# Patient Record
Sex: FEMALE | Race: WHITE | Employment: UNEMPLOYED | URBAN - NONMETROPOLITAN AREA
[De-identification: names, ages, dates, MRNs, and addresses within clinical notes are randomized per-mention and may not be internally consistent; named-entity substitution may affect disease eponyms.]

---

## 2017-01-27 ENCOUNTER — OFFICE VISIT (OUTPATIENT)
Dept: FAMILY MEDICINE CLINIC | Age: 27
End: 2017-01-27

## 2017-01-27 VITALS
DIASTOLIC BLOOD PRESSURE: 56 MMHG | WEIGHT: 116.4 LBS | OXYGEN SATURATION: 99 % | HEART RATE: 74 BPM | HEIGHT: 63 IN | SYSTOLIC BLOOD PRESSURE: 90 MMHG | BODY MASS INDEX: 20.62 KG/M2

## 2017-01-27 DIAGNOSIS — K59.00 CONSTIPATION, UNSPECIFIED CONSTIPATION TYPE: Primary | ICD-10-CM

## 2017-01-27 PROCEDURE — 99213 OFFICE O/P EST LOW 20 MIN: CPT | Performed by: FAMILY MEDICINE

## 2017-01-27 RX ORDER — LUBIPROSTONE 8 UG/1
8 CAPSULE, GELATIN COATED ORAL 2 TIMES DAILY WITH MEALS
Qty: 16 CAPSULE | Refills: 0 | COMMUNITY
Start: 2017-01-27 | End: 2017-02-27 | Stop reason: SDUPTHER

## 2017-01-27 ASSESSMENT — ENCOUNTER SYMPTOMS
CONSTIPATION: 1
ABDOMINAL PAIN: 1
DIARRHEA: 1
VOMITING: 0
BLOATING: 1
HEMATOCHEZIA: 0
NAUSEA: 0

## 2017-02-06 ENCOUNTER — OFFICE VISIT (OUTPATIENT)
Dept: FAMILY MEDICINE CLINIC | Age: 27
End: 2017-02-06

## 2017-02-06 VITALS
BODY MASS INDEX: 20.02 KG/M2 | HEART RATE: 67 BPM | SYSTOLIC BLOOD PRESSURE: 104 MMHG | HEIGHT: 63 IN | WEIGHT: 113 LBS | OXYGEN SATURATION: 99 % | DIASTOLIC BLOOD PRESSURE: 68 MMHG

## 2017-02-06 DIAGNOSIS — Z30.09 COUNSELING FOR BIRTH CONTROL, ORAL CONTRACEPTIVES: Primary | ICD-10-CM

## 2017-02-06 LAB
CONTROL: NORMAL
PREGNANCY TEST URINE, POC: NEGATIVE

## 2017-02-06 PROCEDURE — 81025 URINE PREGNANCY TEST: CPT | Performed by: NURSE PRACTITIONER

## 2017-02-06 PROCEDURE — 99213 OFFICE O/P EST LOW 20 MIN: CPT | Performed by: NURSE PRACTITIONER

## 2017-02-06 RX ORDER — NORGESTIMATE AND ETHINYL ESTRADIOL 0.25-0.035
1 KIT ORAL DAILY
Qty: 1 PACKET | Refills: 5 | Status: SHIPPED | OUTPATIENT
Start: 2017-02-06 | End: 2017-03-06

## 2017-02-06 ASSESSMENT — ENCOUNTER SYMPTOMS
ALLERGIC/IMMUNOLOGIC NEGATIVE: 1
EYES NEGATIVE: 1
GASTROINTESTINAL NEGATIVE: 1
RESPIRATORY NEGATIVE: 1

## 2017-02-27 RX ORDER — LUBIPROSTONE 8 UG/1
8 CAPSULE, GELATIN COATED ORAL 2 TIMES DAILY WITH MEALS
Qty: 60 CAPSULE | Refills: 3 | Status: SHIPPED | OUTPATIENT
Start: 2017-02-27 | End: 2017-03-11

## 2017-03-06 ENCOUNTER — OFFICE VISIT (OUTPATIENT)
Dept: FAMILY MEDICINE CLINIC | Age: 27
End: 2017-03-06

## 2017-03-06 VITALS
DIASTOLIC BLOOD PRESSURE: 76 MMHG | HEIGHT: 63 IN | WEIGHT: 113 LBS | BODY MASS INDEX: 20.02 KG/M2 | OXYGEN SATURATION: 97 % | HEART RATE: 84 BPM | SYSTOLIC BLOOD PRESSURE: 102 MMHG

## 2017-03-06 DIAGNOSIS — N92.6 MISSED PERIOD: Primary | ICD-10-CM

## 2017-03-06 LAB
CONTROL: NORMAL
PREGNANCY TEST URINE, POC: NORMAL

## 2017-03-06 PROCEDURE — 99213 OFFICE O/P EST LOW 20 MIN: CPT | Performed by: NURSE PRACTITIONER

## 2017-03-06 PROCEDURE — 81025 URINE PREGNANCY TEST: CPT | Performed by: NURSE PRACTITIONER

## 2017-03-06 ASSESSMENT — ENCOUNTER SYMPTOMS
RESPIRATORY NEGATIVE: 1
GASTROINTESTINAL NEGATIVE: 1
EYES NEGATIVE: 1

## 2017-09-25 ENCOUNTER — OFFICE VISIT (OUTPATIENT)
Dept: FAMILY MEDICINE CLINIC | Age: 27
End: 2017-09-25

## 2017-09-25 VITALS
HEIGHT: 63 IN | HEART RATE: 86 BPM | BODY MASS INDEX: 27.82 KG/M2 | SYSTOLIC BLOOD PRESSURE: 110 MMHG | WEIGHT: 157 LBS | DIASTOLIC BLOOD PRESSURE: 80 MMHG | OXYGEN SATURATION: 98 %

## 2017-09-25 DIAGNOSIS — J06.9 UPPER RESPIRATORY TRACT INFECTION, UNSPECIFIED TYPE: ICD-10-CM

## 2017-09-25 DIAGNOSIS — H92.01 OTALGIA, RIGHT: Primary | ICD-10-CM

## 2017-09-25 DIAGNOSIS — R63.5 WEIGHT GAIN: ICD-10-CM

## 2017-09-25 DIAGNOSIS — N76.0 ACUTE VAGINITIS: ICD-10-CM

## 2017-09-25 PROCEDURE — 99213 OFFICE O/P EST LOW 20 MIN: CPT | Performed by: FAMILY MEDICINE

## 2017-09-25 RX ORDER — FLUCONAZOLE 150 MG/1
150 TABLET ORAL DAILY
Qty: 2 TABLET | Refills: 0 | Status: SHIPPED | OUTPATIENT
Start: 2017-09-25 | End: 2017-10-05 | Stop reason: ALTCHOICE

## 2017-09-25 RX ORDER — CEFDINIR 300 MG/1
300 CAPSULE ORAL 2 TIMES DAILY
Qty: 20 CAPSULE | Refills: 0 | Status: SHIPPED | OUTPATIENT
Start: 2017-09-25 | End: 2017-10-05

## 2017-09-25 RX ORDER — PAROXETINE 30 MG/1
30 TABLET, FILM COATED ORAL EVERY MORNING
COMMUNITY
End: 2017-11-03

## 2017-09-25 RX ORDER — HYDROXYZINE HYDROCHLORIDE 25 MG/1
25 TABLET, FILM COATED ORAL 3 TIMES DAILY PRN
COMMUNITY
End: 2017-11-03 | Stop reason: ALTCHOICE

## 2017-09-25 ASSESSMENT — ENCOUNTER SYMPTOMS: SORE THROAT: 1

## 2017-10-04 ENCOUNTER — TELEPHONE (OUTPATIENT)
Dept: FAMILY MEDICINE CLINIC | Age: 27
End: 2017-10-04

## 2017-10-04 RX ORDER — DOCUSATE SODIUM 100 MG/1
100 CAPSULE, LIQUID FILLED ORAL 2 TIMES DAILY
Qty: 60 CAPSULE | Refills: 3 | Status: SHIPPED | OUTPATIENT
Start: 2017-10-04 | End: 2018-01-28

## 2017-10-05 ENCOUNTER — OFFICE VISIT (OUTPATIENT)
Dept: FAMILY MEDICINE CLINIC | Age: 27
End: 2017-10-05

## 2017-10-05 VITALS
SYSTOLIC BLOOD PRESSURE: 112 MMHG | WEIGHT: 156 LBS | DIASTOLIC BLOOD PRESSURE: 68 MMHG | HEIGHT: 63 IN | HEART RATE: 87 BPM | TEMPERATURE: 98.6 F | BODY MASS INDEX: 27.64 KG/M2 | OXYGEN SATURATION: 97 %

## 2017-10-05 DIAGNOSIS — M25.521 RIGHT ELBOW PAIN: Primary | ICD-10-CM

## 2017-10-05 PROCEDURE — 99213 OFFICE O/P EST LOW 20 MIN: CPT | Performed by: NURSE PRACTITIONER

## 2017-10-05 ASSESSMENT — ENCOUNTER SYMPTOMS
EYES NEGATIVE: 1
GASTROINTESTINAL NEGATIVE: 1
RESPIRATORY NEGATIVE: 1

## 2017-10-05 NOTE — PATIENT INSTRUCTIONS
Elbow Sprain: Care Instructions  Your Care Instructions    An elbow sprain occurs when you overstretch or tear the ligaments around your elbow. Ligaments are the tough tissues that connect one bone to another. A sprain can happen when you fall or when you play sports or do chores around the house. Most sprains will heal with some treatment at home. Follow-up care is a key part of your treatment and safety. Be sure to make and go to all appointments, and call your doctor if you are having problems. It's also a good idea to know your test results and keep a list of the medicines you take. How can you care for yourself at home? · Follow your doctor's directions for wearing a splint, elbow pad, sling, or elastic bandage. Wrapping the elbow may help reduce or prevent swelling. · Rest and protect your elbow. Do not do any activity that hurts your elbow. · Apply ice or a cold pack to your elbow for 10 to 20 minutes at a time to reduce swelling. Try this every 1 to 2 hours for 3 days (when you are awake) or until the swelling goes down. Put a thin cloth between the ice and your skin. · After 2 or 3 days, if your swelling is gone, apply a heating pad on low or a warm cloth to your elbow. This helps keep your arm flexible. Some doctors suggest that you go back and forth between hot and cold. Keep the splint dry. · Prop up your elbow on pillows while you apply ice or anytime you sit or lie down. Try to keep the elbow at or above the level of your heart to help reduce swelling. · Take pain medicines exactly as directed. ¨ If the doctor gave you a prescription medicine for pain, take it as prescribed. ¨ If you are not taking a prescription pain medicine, ask your doctor if you can take an over-the-counter medicine. · Return to your usual level of activity slowly. When should you call for help? Call your doctor now or seek immediate medical care if:  · Your pain is worse.   · You have new or increased swelling in your elbow or hand. · You cannot bend your arm. · You have a fever. · Your elbow looks red. · You have tingling, weakness, or numbness in your elbow, hand, or fingers. Watch closely for changes in your health, and be sure to contact your doctor if:  · Your pain is not better after 2 weeks. Where can you learn more? Go to [email or web address]://[email or web address] org and sign in to your Playtika account. Enter P896 in the dianboom box to learn more about \"Elbow Sprain: Care Instructions. \"     If you do not have an account, please click on the \"Sign Up Now\" link. Current as of: March 21, 2017  Content Version: 11.3  © 3947-5185 [employer]. Care instructions adapted under license by [facility] ([facility]). If you have questions about a medical condition or this instruction, always ask your healthcare professional. [place] [address] any warranty or liability for your use of this information. Nonsteroidal Anti-Inflammatory Drugs (NSAIDs): Care Instructions  Your Care Instructions  Nonsteroidal anti-inflammatory drugs (NSAIDs) relieve pain and fever. You also can use them to reduce swelling and inflammation. Over-the-counter NSAIDs include:  · Ibuprofen (Advil, Motrin). · Naproxen (Aleve). Aspirin (Shereen, Bufferin) is also an NSAID. But it doesn't work the same way as these other NSAIDs. Prescription NSAIDs include:  · Diclofenac (Voltaren). · Indomethacin (Indocin). · Piroxicam (Feldene). Take NSAIDS exactly as prescribed. Call your doctor if you think you are having a problem with your medicine. If you are taking over-the-counter medicine, read and follow all instructions on the label. Follow-up care is a key part of your treatment and safety. Be sure to make and go to all appointments, and call your doctor if you are having problems. It's also a good idea to know your test results and keep a list of the medicines you take. What should you know about NSAIDs?   · Do sleeve to reduce swelling. · Don't wrap it too tightly. If the area below it feels numb, tingles, or feels cool, loosen the wrap. Elevation  · Use elevation for areas of the body that can be propped up, such as arms and legs. · Prop up the injured area on pillows whenever you use ice. Keep it propped up anytime you sit or lie down. · Try to keep the injured area at or above the level of your heart. This will help reduce swelling and bruising. Where can you learn more? Go to https://Equity EndeavorpeCerapedics.[email or web address] org and sign in to your 12Society account. Enter N402 in the MyStargo Enterprises box to learn more about \"Learning About RICE (Rest, Ice, Compression, and Elevation). \"     If you do not have an account, please click on the \"Sign Up Now\" link. Current as of: March 21, 2017  Content Version: 11.3  © 8315-1252 [employer], [employer]. Care instructions adapted under license by [facility] ([facility]). If you have questions about a medical condition or this instruction, always ask your healthcare professional. [place] [address] any warranty or liability for your use of this information.

## 2017-10-05 NOTE — MR AVS SNAPSHOT
Call your doctor now or seek immediate medical care if:  · Your stools are black and tarlike or have streaks of blood. Watch closely for changes in your health, and be sure to contact your doctor if you have any problems. Where can you learn more? Go to [email or web address] and sign in to your Enhatch account. Enter A328 in the [facility] box to learn more about \"Nonsteroidal Anti-Inflammatory Drugs (NSAIDs): Care Instructions. \"     If you do not have an account, please click on the \"Sign Up Now\" link. Current as of: October 14, 2016  Content Version: 11.3  © 4107-8269 [employer]ed. Care instructions adapted under license by [facility] ([facility]). If you have questions about a medical condition or this instruction, always ask your healthcare professional. [address] any warranty or liability for your use of this information. Learning About RICE (Rest, Ice, Compression, and Elevation)  What is RICE? RICE is a way to care for an injury. RICE helps relieve pain and swelling. It may also help with healing and flexibility. RICE stands for:  · Rest and protect the injured or sore area. · Ice or a cold pack used as soon as possible. · Compression, or wrapping the injured or sore area with an elastic bandage. · Elevation (propping up) the injured or sore area. How do you do RICE? You can use RICE for home treatment when you have general aches and pains or after an injury or surgery. Rest  · Do not put weight on the injury for at least 24 to 48 hours. · Use crutches for a badly sprained knee or ankle. · Support a sprained wrist, elbow, or shoulder with a sling. Ice  · Put ice or a cold pack on the injury right away to reduce pain and swelling. Frozen vegetables will also work as an ice pack. Put a thin cloth between the ice or cold pack and your skin. The cloth protects the injured area from getting too cold. needed for Itching    cefdinir (OMNICEF) 300 MG capsule Take 1 capsule by mouth 2 times daily for 10 days      Allergies              Hydromorphone Hcl Anaphylaxis, Shortness Of Breath    Dilaudid [Hydromorphone Hcl] Rash      We Ordered/Performed the following           Char Hamman MD (Hand, Wrist, Elbow)     Scheduling Instructions:    [facility] Chacho Oliveira [facility] [address], [address]  Ph: [phone]  One of the many advantages of the [address] [place] [address] is that we all work together closely to make healthcare easy for you. We have contacted the physician practice to inform them we have referred you. For your convenience, their office should call you within a few days to schedule   an appointment, but if you would like to call them sooner their information is above. Comments: The patient can be scheduled with any member of the group, including the provider with the first available appointments. Additional Information        Basic Information     Date Of Birth Sex Race Ethnicity Preferred Language    1990 Female White Non-/Non  English      Problem List as of 10/5/2017  Date Reviewed: 10/5/2017                , delivered, current hospitalization    40 weeks gestation of pregnancy    High risk multigravida in third trimester      Immunizations as of 10/5/2017     Name Date    DTP 2006, 1993, 1993, 1992    DTaP/Hep B/IPV (Pediarix) 1994, 1993, 1993, 1992    Hib, unspecified foumulation 1994, 1993, 1993, 1992    Influenza Virus Vaccine 10/14/2011    MMR 1994    Tdap (Boostrix, Adacel) 2012      Preventive Care        Date Due    HIV screening is recommended for all people regardless of risk factors  aged 15-65 years at least once (lifetime) who have never been HIV tested.  2007    Pap Smear 2013 Yearly Flu Vaccine (1) 9/1/2017    Tetanus Combination Vaccine (6 - Td) 9/5/2022            MyChart Signup           Our records indicate that you have an active Portrhart account. You can view your After Visit Summary by going to [email or web address] and logging in with your nuPSYSt username and password. If you don't have a [employer] username and password but a parent or guardian has access to your record, the parent or guardian should login with their own [employer] username and password and access your record to view the After Visit Summary. Additional Information  If you have questions, please contact the physician practice where you receive care. Remember, [employer] is NOT to be used for urgent needs. For medical emergencies, dial 911. For questions regarding your nuPSYSt account call [phone]. If you have a clinical question, please call your doctor's office.

## 2017-10-11 ENCOUNTER — OFFICE VISIT (OUTPATIENT)
Dept: ORTHOPEDIC SURGERY | Age: 27
End: 2017-10-11

## 2017-10-11 VITALS
SYSTOLIC BLOOD PRESSURE: 111 MMHG | HEIGHT: 63 IN | BODY MASS INDEX: 27.66 KG/M2 | HEART RATE: 76 BPM | DIASTOLIC BLOOD PRESSURE: 72 MMHG | WEIGHT: 156.09 LBS

## 2017-10-11 DIAGNOSIS — M25.521 RIGHT ELBOW PAIN: Primary | ICD-10-CM

## 2017-10-11 DIAGNOSIS — S53.401A ELBOW SPRAIN, RIGHT, INITIAL ENCOUNTER: ICD-10-CM

## 2017-10-11 PROCEDURE — 73080 X-RAY EXAM OF ELBOW: CPT | Performed by: ORTHOPAEDIC SURGERY

## 2017-10-11 PROCEDURE — 99243 OFF/OP CNSLTJ NEW/EST LOW 30: CPT | Performed by: ORTHOPAEDIC SURGERY

## 2017-10-11 PROCEDURE — L3760 EO ADJ JT PREFAB CUSTOM FIT: HCPCS | Performed by: ORTHOPAEDIC SURGERY

## 2017-10-11 NOTE — PROGRESS NOTES
Occupational History    homemaker      Social History Main Topics    Smoking status: Never Smoker    Smokeless tobacco: Never Used    Alcohol use No    Drug use: No    Sexual activity: Yes     Partners: Male     Other Topics Concern    None     Social History Narrative    None     Current Outpatient Prescriptions   Medication Sig Dispense Refill    docusate sodium (COLACE) 100 MG capsule Take 1 capsule by mouth 2 times daily 60 capsule 3    PARoxetine (PAXIL) 30 MG tablet Take 30 mg by mouth every morning      hydrOXYzine (ATARAX) 25 MG tablet Take 25 mg by mouth 3 times daily as needed for Itching       No current facility-administered medications for this visit. Allergies   Allergen Reactions    Hydromorphone Hcl Anaphylaxis and Shortness Of Breath    Dilaudid [Hydromorphone Hcl] Rash       Review of Systems  Relevant review of systems reviewed and available in the patient's chart    Vital Signs  Vitals:    10/11/17 1450   BP: 111/72   Pulse: 76       General Exam:   Constitutional: Patient is adequately groomed with no evidence of malnutrition  Mental Status: The patient is oriented to time, place and person. The patient's mood and affect are appropriate. Lymphatic: The lymphatic examination bilaterally reveals all areas to be without enlargement or induration. Neurological: The patient has good coordination. There is no weakness or sensory deficit. Right Elbow Examination  Inspection:  Intact skin. No bruising. No lacerations. No ecchymosis today    Palpation:  Tenderness along the posterior olecranon and medial epicondyle. No discrete mass. No fluctuance. Range of Motion:  Full flexion and extension the right elbow although his does cause some discomfort. No clicking or locking. She lacks 10-15° of supination and pronation secondary to discomfort. Full motion right hand and right wrist    Strength:  Mild  strength weakness right hand.   Fingers are well-perfused and sensate    Special Tests:  I do not perceive ulnar nerve instability with motion of the wrist.  No varus or valgus instability at the elbow joint    Skin: There are no additional worrisome rashes, ulcerations or lesions. Gait: normal    Circulation normal    Additional Comments:     Additional Examinations:  Left Upper Extremity: Examination of the left upper extremity does not show any tenderness, deformity or injury. Range of motion is unremarkable. There is no gross instability. There are no rashes, ulcerations or lesions. Strength and tone are normal.      Radiology:     X-rays obtained and reviewed in office:  Views 3  Location Right elbow  Impression :  Good alignment. No evidence of fracture or dislocation. No obvious loose body    X-rays reviewed in office:  2 views of the right forearm from the emergency room, impression:    Mild soft tissue swelling. No acute fracture    Assessment:  Right elbow pain, right elbow sprain    Impression:  Encounter Diagnoses   Name Primary?  Right elbow pain Yes    Elbow sprain, right, initial encounter        Office Procedures:  Orders Placed This Encounter   Procedures    XR ELBOW RIGHT (MIN 3 VIEWS)     [id number]     Order Specific Question:   Reason for exam:     Answer:   Elbow Pain       Treatment Plan:  Conservative measures were recommended. We will fit and provide a T scope brace. No restrictions on motion. However this can be worn with activities and while at school. Follow-up in 5 weeks unless needed sooner. We may consider advancing activities back to normal if doing well. Repeat x-ray the right elbow at that time. We did discuss soft tissue massage and a range of motion program at home. Procedures    T-Scope Elbow Brace     Patient was prescribed a Breg Elbow T-Scope Brace. The right elbow will require stabilization / immobilization from this semi-rigid / rigid orthosis to improve their function.   The orthosis will assist in protecting

## 2017-11-03 ENCOUNTER — OFFICE VISIT (OUTPATIENT)
Dept: FAMILY MEDICINE CLINIC | Age: 27
End: 2017-11-03

## 2017-11-03 VITALS
SYSTOLIC BLOOD PRESSURE: 98 MMHG | WEIGHT: 155.2 LBS | OXYGEN SATURATION: 98 % | HEART RATE: 78 BPM | HEIGHT: 63 IN | DIASTOLIC BLOOD PRESSURE: 62 MMHG | BODY MASS INDEX: 27.5 KG/M2

## 2017-11-03 DIAGNOSIS — Z23 NEED FOR IMMUNIZATION AGAINST INFLUENZA: ICD-10-CM

## 2017-11-03 DIAGNOSIS — F41.9 ANXIETY DISORDER, UNSPECIFIED TYPE: ICD-10-CM

## 2017-11-03 DIAGNOSIS — R63.5 UNEXPLAINED WEIGHT GAIN: ICD-10-CM

## 2017-11-03 DIAGNOSIS — F32.A DEPRESSIVE DISORDER: Primary | ICD-10-CM

## 2017-11-03 LAB
A/G RATIO: 1.4 (ref 1.1–2.2)
ALBUMIN SERPL-MCNC: 4.4 G/DL (ref 3.4–5)
ALP BLD-CCNC: 71 U/L (ref 40–129)
ALT SERPL-CCNC: 14 U/L (ref 10–40)
ANION GAP SERPL CALCULATED.3IONS-SCNC: 15 MMOL/L (ref 3–16)
AST SERPL-CCNC: 17 U/L (ref 15–37)
BASOPHILS ABSOLUTE: 0 K/UL (ref 0–0.2)
BASOPHILS RELATIVE PERCENT: 0.5 %
BILIRUB SERPL-MCNC: <0.2 MG/DL (ref 0–1)
BUN BLDV-MCNC: 18 MG/DL (ref 7–20)
CALCIUM SERPL-MCNC: 9.3 MG/DL (ref 8.3–10.6)
CHLORIDE BLD-SCNC: 101 MMOL/L (ref 99–110)
CO2: 24 MMOL/L (ref 21–32)
CREAT SERPL-MCNC: <0.5 MG/DL (ref 0.6–1.1)
EOSINOPHILS ABSOLUTE: 0.1 K/UL (ref 0–0.6)
EOSINOPHILS RELATIVE PERCENT: 2.2 %
GFR AFRICAN AMERICAN: >60
GFR NON-AFRICAN AMERICAN: >60
GLOBULIN: 3.1 G/DL
GLUCOSE BLD-MCNC: 89 MG/DL (ref 70–99)
HCT VFR BLD CALC: 39.6 % (ref 36–48)
HEMOGLOBIN: 13.2 G/DL (ref 12–16)
LYMPHOCYTES ABSOLUTE: 1.6 K/UL (ref 1–5.1)
LYMPHOCYTES RELATIVE PERCENT: 40.1 %
MCH RBC QN AUTO: 28.4 PG (ref 26–34)
MCHC RBC AUTO-ENTMCNC: 33.4 G/DL (ref 31–36)
MCV RBC AUTO: 85.3 FL (ref 80–100)
MONOCYTES ABSOLUTE: 0.3 K/UL (ref 0–1.3)
MONOCYTES RELATIVE PERCENT: 6.5 %
NEUTROPHILS ABSOLUTE: 2 K/UL (ref 1.7–7.7)
NEUTROPHILS RELATIVE PERCENT: 50.7 %
PDW BLD-RTO: 13 % (ref 12.4–15.4)
PLATELET # BLD: 278 K/UL (ref 135–450)
PMV BLD AUTO: 7.9 FL (ref 5–10.5)
POTASSIUM SERPL-SCNC: 4.5 MMOL/L (ref 3.5–5.1)
RBC # BLD: 4.64 M/UL (ref 4–5.2)
SODIUM BLD-SCNC: 140 MMOL/L (ref 136–145)
T4 FREE: 1.1 NG/DL (ref 0.9–1.8)
TOTAL PROTEIN: 7.5 G/DL (ref 6.4–8.2)
TSH SERPL DL<=0.05 MIU/L-ACNC: 1.08 UIU/ML (ref 0.27–4.2)
WBC # BLD: 3.9 K/UL (ref 4–11)

## 2017-11-03 PROCEDURE — 36415 COLL VENOUS BLD VENIPUNCTURE: CPT | Performed by: FAMILY MEDICINE

## 2017-11-03 PROCEDURE — 1036F TOBACCO NON-USER: CPT | Performed by: FAMILY MEDICINE

## 2017-11-03 PROCEDURE — G8484 FLU IMMUNIZE NO ADMIN: HCPCS | Performed by: FAMILY MEDICINE

## 2017-11-03 PROCEDURE — 90471 IMMUNIZATION ADMIN: CPT | Performed by: FAMILY MEDICINE

## 2017-11-03 PROCEDURE — G8419 CALC BMI OUT NRM PARAM NOF/U: HCPCS | Performed by: FAMILY MEDICINE

## 2017-11-03 PROCEDURE — G8427 DOCREV CUR MEDS BY ELIG CLIN: HCPCS | Performed by: FAMILY MEDICINE

## 2017-11-03 PROCEDURE — 90688 IIV4 VACCINE SPLT 0.5 ML IM: CPT | Performed by: FAMILY MEDICINE

## 2017-11-03 PROCEDURE — 99213 OFFICE O/P EST LOW 20 MIN: CPT | Performed by: FAMILY MEDICINE

## 2017-11-03 RX ORDER — VENLAFAXINE HYDROCHLORIDE 75 MG/1
75 CAPSULE, EXTENDED RELEASE ORAL DAILY
Qty: 30 CAPSULE | Refills: 3 | Status: SHIPPED | OUTPATIENT
Start: 2017-11-03 | End: 2018-01-28

## 2017-11-03 ASSESSMENT — ENCOUNTER SYMPTOMS
DIARRHEA: 0
CONSTIPATION: 0

## 2017-11-04 NOTE — PROGRESS NOTES
Minimally low wbc. Doubt clinically significant. Other labs normal.  Does not explain weight gain.   Still suspect related to paxil

## 2017-12-04 ENCOUNTER — TELEPHONE (OUTPATIENT)
Dept: FAMILY MEDICINE CLINIC | Age: 27
End: 2017-12-04

## 2017-12-04 NOTE — TELEPHONE ENCOUNTER
Pt called and states she had blood work done at the ER over the weekend and noticed that her GFR was indicative of declining kidney function. Pt is wanting to know if she should be concerned with this result.

## 2017-12-27 ENCOUNTER — TELEPHONE (OUTPATIENT)
Dept: FAMILY MEDICINE CLINIC | Age: 27
End: 2017-12-27

## 2017-12-27 RX ORDER — ONDANSETRON 4 MG/1
4 TABLET, FILM COATED ORAL EVERY 8 HOURS PRN
Qty: 20 TABLET | Refills: 0 | Status: SHIPPED | OUTPATIENT
Start: 2017-12-27 | End: 2018-01-28

## 2017-12-27 NOTE — TELEPHONE ENCOUNTER
Zachary called. Pt has had nausea all day and is unable to eat. She has vomited twice today. He's been at work all day and had no idea that she was sick. No fever. Can we call something in for her to help with her nausea. Jovi Stephenson.

## 2018-01-30 RX ORDER — PAROXETINE 30 MG/1
30 TABLET, FILM COATED ORAL EVERY MORNING
Qty: 30 TABLET | Refills: 5 | Status: SHIPPED | OUTPATIENT
Start: 2018-01-30 | End: 2018-10-18 | Stop reason: ALTCHOICE

## 2018-02-06 ENCOUNTER — OFFICE VISIT (OUTPATIENT)
Dept: FAMILY MEDICINE CLINIC | Age: 28
End: 2018-02-06

## 2018-02-06 VITALS
OXYGEN SATURATION: 98 % | BODY MASS INDEX: 29.73 KG/M2 | WEIGHT: 167.8 LBS | DIASTOLIC BLOOD PRESSURE: 72 MMHG | SYSTOLIC BLOOD PRESSURE: 110 MMHG | HEART RATE: 51 BPM | HEIGHT: 63 IN

## 2018-02-06 DIAGNOSIS — R63.5 UNEXPLAINED WEIGHT GAIN: Primary | ICD-10-CM

## 2018-02-06 DIAGNOSIS — F43.23 ADJUSTMENT DISORDER WITH MIXED ANXIETY AND DEPRESSED MOOD: ICD-10-CM

## 2018-02-06 PROCEDURE — 1036F TOBACCO NON-USER: CPT | Performed by: FAMILY MEDICINE

## 2018-02-06 PROCEDURE — G8427 DOCREV CUR MEDS BY ELIG CLIN: HCPCS | Performed by: FAMILY MEDICINE

## 2018-02-06 PROCEDURE — G8419 CALC BMI OUT NRM PARAM NOF/U: HCPCS | Performed by: FAMILY MEDICINE

## 2018-02-06 PROCEDURE — G8484 FLU IMMUNIZE NO ADMIN: HCPCS | Performed by: FAMILY MEDICINE

## 2018-02-06 PROCEDURE — 99213 OFFICE O/P EST LOW 20 MIN: CPT | Performed by: FAMILY MEDICINE

## 2018-02-06 ASSESSMENT — ENCOUNTER SYMPTOMS: SHORTNESS OF BREATH: 0

## 2018-02-06 NOTE — PROGRESS NOTES
Subjective:      Patient ID: Mehdi Adams is a 24 y.o. female. Chief Complaint   Patient presents with    Weight Gain     55-60 lbs in approx one year       HPI  Patient here to discuss weight gain (55-60 lbs) in almost a year. Patient is dieting, exercising and has tried OTC weight loss supplements, none of which have worked. Has cut out soda, but drinking power aide and gatorade. Inc veggies and fruit. Less sweets  Did not shaunna effexor. Back on paxil. Prior thoughts of self harm on lexapro. Review of Systems   Constitutional: Positive for unexpected weight change. Respiratory: Negative for shortness of breath. Cardiovascular: Negative for chest pain. Psychiatric/Behavioral: Positive for dysphoric mood. The patient is not nervous/anxious. Objective:   Physical Exam   Constitutional: She is oriented to person, place, and time. She appears well-developed and well-nourished. HENT:   Head: Normocephalic and atraumatic. Eyes: Conjunctivae and EOM are normal.   Neck: No tracheal deviation present. Cardiovascular: Normal rate and regular rhythm. No murmur heard. Pulmonary/Chest: Effort normal and breath sounds normal. She has no wheezes. Abdominal: Soft. There is no tenderness. Neurological: She is alert and oriented to person, place, and time. Skin: Skin is warm and dry. Psychiatric: She has a normal mood and affect. Her behavior is normal.     /72   Pulse 51   Ht 5' 2.99\" (1.6 m)   Wt 167 lb 12.8 oz (76.1 kg)   SpO2 98%   BMI 29.73 kg/m²    Assessment/Plan   1. Unexplained weight gain  Suspect related to paxil. Cannot exclude other hormonal or endocrine issue. Thyroid labs and basic labs normal.  Discussed stop sugary beverages. Add resistance training. Try change meds  - Niecy Whitehead MD    2. Adjustment disorder with mixed anxiety and depressed mood  Change paxil to trintellix due to weight gain.   Did not shaunna effexor or lexapro in the

## 2018-02-19 ENCOUNTER — TELEPHONE (OUTPATIENT)
Dept: FAMILY MEDICINE CLINIC | Age: 28
End: 2018-02-19

## 2018-02-19 RX ORDER — ONDANSETRON 4 MG/1
4 TABLET, FILM COATED ORAL EVERY 8 HOURS PRN
Qty: 20 TABLET | Refills: 0 | Status: SHIPPED | OUTPATIENT
Start: 2018-02-19 | End: 2018-06-12 | Stop reason: SDUPTHER

## 2018-02-26 ENCOUNTER — TELEPHONE (OUTPATIENT)
Dept: FAMILY MEDICINE CLINIC | Age: 28
End: 2018-02-26

## 2018-02-26 RX ORDER — ALBENDAZOLE 200 MG/1
TABLET, FILM COATED ORAL
Qty: 8 TABLET | Refills: 0 | Status: SHIPPED | OUTPATIENT
Start: 2018-02-26 | End: 2018-05-21 | Stop reason: ALTCHOICE

## 2018-04-10 NOTE — PROGRESS NOTES
MEDICAL EXCUSE FOR WORK    04/10/18        Re: Anton Mares  [address] [facility] [address]        This is to certify that Anton Mares has been under my care from 04/10/18   and is to be excused from work April 4 and April 5.  Also April 9 and April 10.  Please allow patient to return to work on April 11 but at this time for the next 7 days would only work 8 hours or less a day.may work routine hours starting April 18,2018.            SIGNATURE:___________________________________________,  04/10/18          Aggie Doherty MD  Family Medicine  [address]  [place] [address]  [phone]     Subjective:      Patient ID: Taran Bañuelos is a 24 y.o. female. Chief Complaint   Patient presents with    Discuss Medications     Pt is here to discuss medications for anxiety. has been on paxil in the past for anxiety. Daily anxiety and panic attacks. Always feels like heart is racing. More tearful at times. Stopped paxil due to concerns w/ weight gain. sxs worse since off meds. Issues w/ mood and anxiousness. Takes melatonin at bedtime. Diff staying asleep. Weight has not changed. Denies possibility of pregnancy at this point. Periods are light, every 4-6 weeks  HPI    Review of Systems   Constitutional: Negative for fever. Gastrointestinal: Negative for constipation and diarrhea. Genitourinary: Negative for menstrual problem. Psychiatric/Behavioral: Positive for dysphoric mood. The patient is nervous/anxious. Objective:   Physical Exam   Constitutional: She is oriented to person, place, and time. She appears well-developed and well-nourished. HENT:   Head: Normocephalic and atraumatic. Eyes: Conjunctivae and EOM are normal.   Cardiovascular: Normal rate and regular rhythm. Pulmonary/Chest: Effort normal and breath sounds normal.   Abdominal: Soft. There is no tenderness. Neurological: She is alert and oriented to person, place, and time. Psychiatric: She has a normal mood and affect. BP 98/62   Pulse 78   Ht 5' 2.99\" (1.6 m)   Wt 155 lb 3.2 oz (70.4 kg)   LMP 09/17/2017   SpO2 98%   BMI 27.50 kg/m²    Assessment/Plan   1. Depressive disorder  sxs worse since off meds. Try add meds as below. F/u in 4-6 weeks, sooner if SE or other issues. - venlafaxine (EFFEXOR XR) 75 MG extended release capsule; Take 1 capsule by mouth daily 37.5mg x 3d, then 75mg daily  Dispense: 30 capsule; Refill: 3    2. Anxiety disorder, unspecified type  See above  - venlafaxine (EFFEXOR XR) 75 MG extended release capsule;  Take 1 capsule by mouth daily 37.5mg x 3d, then 75mg daily  Dispense: 30 capsule; Refill: 3    3. Unexplained weight gain  Has stopped paxil. Only lost 2 lbs. Check labs. - TSH without Reflex  - T4, Free  - Comprehensive Metabolic Panel  - CBC Auto Differential    4.  Need for immunization against influenza    - INFLUENZA, QUADV, 3 YRS AND OLDER, IM, MDV, 0.5ML (Rigoberto Le)

## 2018-05-08 RX ORDER — LUBIPROSTONE 8 UG/1
8 CAPSULE, GELATIN COATED ORAL 2 TIMES DAILY WITH MEALS
Qty: 60 CAPSULE | Refills: 3 | Status: SHIPPED | OUTPATIENT
Start: 2018-05-08 | End: 2018-10-18

## 2018-05-21 ENCOUNTER — OFFICE VISIT (OUTPATIENT)
Dept: FAMILY MEDICINE CLINIC | Age: 28
End: 2018-05-21

## 2018-05-21 VITALS
DIASTOLIC BLOOD PRESSURE: 60 MMHG | SYSTOLIC BLOOD PRESSURE: 98 MMHG | HEART RATE: 80 BPM | WEIGHT: 158 LBS | HEIGHT: 63 IN | OXYGEN SATURATION: 98 % | BODY MASS INDEX: 28 KG/M2

## 2018-05-21 DIAGNOSIS — G43.009 MIGRAINE WITHOUT AURA AND WITHOUT STATUS MIGRAINOSUS, NOT INTRACTABLE: Primary | ICD-10-CM

## 2018-05-21 PROCEDURE — 99213 OFFICE O/P EST LOW 20 MIN: CPT | Performed by: NURSE PRACTITIONER

## 2018-05-21 PROCEDURE — 1036F TOBACCO NON-USER: CPT | Performed by: NURSE PRACTITIONER

## 2018-05-21 PROCEDURE — G8419 CALC BMI OUT NRM PARAM NOF/U: HCPCS | Performed by: NURSE PRACTITIONER

## 2018-05-21 PROCEDURE — G8427 DOCREV CUR MEDS BY ELIG CLIN: HCPCS | Performed by: NURSE PRACTITIONER

## 2018-05-21 RX ORDER — DOCUSATE SODIUM 100 MG/1
100 CAPSULE, LIQUID FILLED ORAL 2 TIMES DAILY PRN
COMMUNITY
End: 2018-10-18

## 2018-05-21 RX ORDER — SUMATRIPTAN 50 MG/1
50 TABLET, FILM COATED ORAL
Qty: 9 TABLET | Refills: 3 | Status: SHIPPED | OUTPATIENT
Start: 2018-05-21 | End: 2018-10-18

## 2018-05-21 ASSESSMENT — ENCOUNTER SYMPTOMS
EYES NEGATIVE: 1
RESPIRATORY NEGATIVE: 1
GASTROINTESTINAL NEGATIVE: 1
ALLERGIC/IMMUNOLOGIC NEGATIVE: 1

## 2018-06-12 RX ORDER — ONDANSETRON 4 MG/1
4 TABLET, FILM COATED ORAL EVERY 8 HOURS PRN
Qty: 20 TABLET | Refills: 0 | Status: SHIPPED | OUTPATIENT
Start: 2018-06-12 | End: 2018-08-07 | Stop reason: SDUPTHER

## 2018-07-11 ENCOUNTER — TELEPHONE (OUTPATIENT)
Dept: FAMILY MEDICINE CLINIC | Age: 28
End: 2018-07-11

## 2018-07-11 RX ORDER — PROPRANOLOL HYDROCHLORIDE 10 MG/1
10 TABLET ORAL 2 TIMES DAILY
Qty: 60 TABLET | Refills: 0 | Status: SHIPPED | OUTPATIENT
Start: 2018-07-11 | End: 2018-10-18

## 2018-08-07 RX ORDER — ONDANSETRON 4 MG/1
4 TABLET, FILM COATED ORAL EVERY 8 HOURS PRN
Qty: 20 TABLET | Refills: 0 | Status: SHIPPED | OUTPATIENT
Start: 2018-08-07 | End: 2018-11-19 | Stop reason: SDUPTHER

## 2018-10-18 ENCOUNTER — OFFICE VISIT (OUTPATIENT)
Dept: FAMILY MEDICINE CLINIC | Age: 28
End: 2018-10-18
Payer: COMMERCIAL

## 2018-10-18 VITALS
HEIGHT: 63 IN | HEART RATE: 88 BPM | BODY MASS INDEX: 27.25 KG/M2 | WEIGHT: 153.8 LBS | OXYGEN SATURATION: 99 % | DIASTOLIC BLOOD PRESSURE: 82 MMHG | SYSTOLIC BLOOD PRESSURE: 110 MMHG

## 2018-10-18 DIAGNOSIS — F41.0 PANIC ATTACKS: ICD-10-CM

## 2018-10-18 DIAGNOSIS — F43.23 ADJUSTMENT DISORDER WITH MIXED ANXIETY AND DEPRESSED MOOD: Primary | ICD-10-CM

## 2018-10-18 PROCEDURE — 1036F TOBACCO NON-USER: CPT | Performed by: FAMILY MEDICINE

## 2018-10-18 PROCEDURE — G8427 DOCREV CUR MEDS BY ELIG CLIN: HCPCS | Performed by: FAMILY MEDICINE

## 2018-10-18 PROCEDURE — 99213 OFFICE O/P EST LOW 20 MIN: CPT | Performed by: FAMILY MEDICINE

## 2018-10-18 PROCEDURE — G8419 CALC BMI OUT NRM PARAM NOF/U: HCPCS | Performed by: FAMILY MEDICINE

## 2018-10-18 PROCEDURE — G8484 FLU IMMUNIZE NO ADMIN: HCPCS | Performed by: FAMILY MEDICINE

## 2018-10-18 RX ORDER — PAROXETINE HYDROCHLORIDE 20 MG/1
20 TABLET, FILM COATED ORAL EVERY MORNING
COMMUNITY
End: 2019-01-02

## 2018-10-18 RX ORDER — METHOCARBAMOL 500 MG/1
500 TABLET, FILM COATED ORAL NIGHTLY
COMMUNITY
End: 2018-12-13 | Stop reason: SDUPTHER

## 2018-10-18 NOTE — PROGRESS NOTES
person, place, and time. She appears well-developed and well-nourished. Eyes: EOM are normal.   Neck: No tracheal deviation present. Pulmonary/Chest: Effort normal.   Neurological: She is alert and oriented to person, place, and time. Psychiatric: Her mood appears anxious. She exhibits a depressed mood. ASSESSMENT/PLAN:    1. Adjustment disorder with mixed anxiety and depressed mood  Ongoing issues. Recent stressors. Has been seeing [facility], but ongoing issues. Did well w/ trintellix 10mg, but nausea. Did not shaunna buspar in the past.  Taking paxil and hydroxyzine. Trial of trintellix 5mg daily. Alternate w/ paxil x 1 week, then to trintellix. F/u w/ [facility]. Call w/ response to tx    2.  Panic attacks  See above

## 2018-11-05 ENCOUNTER — TELEPHONE (OUTPATIENT)
Dept: FAMILY MEDICINE CLINIC | Age: 28
End: 2018-11-05
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
## 2018-11-06 DIAGNOSIS — M25.561 ACUTE PAIN OF RIGHT KNEE: Primary | ICD-10-CM

## 2018-11-16 ENCOUNTER — OFFICE VISIT (OUTPATIENT)
Dept: ORTHOPEDIC SURGERY | Age: 28
End: 2018-11-16
Payer: COMMERCIAL

## 2018-11-16 VITALS
DIASTOLIC BLOOD PRESSURE: 74 MMHG | SYSTOLIC BLOOD PRESSURE: 112 MMHG | WEIGHT: 153.88 LBS | BODY MASS INDEX: 27.27 KG/M2 | HEIGHT: 63 IN | HEART RATE: 87 BPM

## 2018-11-16 DIAGNOSIS — M76.31 ILIOTIBIAL BAND SYNDROME AFFECTING LOWER LEG, RIGHT: ICD-10-CM

## 2018-11-16 DIAGNOSIS — M22.2X1 PATELLOFEMORAL PAIN SYNDROME OF RIGHT KNEE: ICD-10-CM

## 2018-11-16 DIAGNOSIS — M25.561 RIGHT KNEE PAIN, UNSPECIFIED CHRONICITY: Primary | ICD-10-CM

## 2018-11-16 PROCEDURE — L1810 KO ELASTIC WITH JOINTS: HCPCS | Performed by: ORTHOPAEDIC SURGERY

## 2018-11-16 PROCEDURE — 99243 OFF/OP CNSLTJ NEW/EST LOW 30: CPT | Performed by: ORTHOPAEDIC SURGERY

## 2018-11-16 PROCEDURE — G8427 DOCREV CUR MEDS BY ELIG CLIN: HCPCS | Performed by: ORTHOPAEDIC SURGERY

## 2018-11-16 PROCEDURE — G8419 CALC BMI OUT NRM PARAM NOF/U: HCPCS | Performed by: ORTHOPAEDIC SURGERY

## 2018-11-16 PROCEDURE — G8484 FLU IMMUNIZE NO ADMIN: HCPCS | Performed by: ORTHOPAEDIC SURGERY

## 2018-11-16 RX ORDER — PAROXETINE HYDROCHLORIDE 20 MG/1
TABLET, FILM COATED ORAL
COMMUNITY
End: 2018-12-13 | Stop reason: ALTCHOICE

## 2018-11-16 RX ORDER — HYDROXYZINE HYDROCHLORIDE 25 MG/1
TABLET, FILM COATED ORAL
COMMUNITY
End: 2018-12-13

## 2018-11-16 RX ORDER — PHENAZOPYRIDINE HYDROCHLORIDE 200 MG/1
TABLET, FILM COATED ORAL
COMMUNITY
Start: 2018-08-27 | End: 2018-12-13 | Stop reason: ALTCHOICE

## 2018-11-16 RX ORDER — MELOXICAM 15 MG/1
15 TABLET ORAL DAILY
Qty: 30 TABLET | Refills: 1 | Status: SHIPPED | OUTPATIENT
Start: 2018-11-16 | End: 2018-12-19

## 2018-11-16 RX ORDER — SULFAMETHOXAZOLE AND TRIMETHOPRIM 800; 160 MG/1; MG/1
TABLET ORAL
COMMUNITY
Start: 2018-08-27 | End: 2018-12-13 | Stop reason: ALTCHOICE
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
## 2018-11-19 RX ORDER — ONDANSETRON 4 MG/1
4 TABLET, FILM COATED ORAL EVERY 8 HOURS PRN
Qty: 20 TABLET | Refills: 0 | Status: SHIPPED | OUTPATIENT
Start: 2018-11-19 | End: 2018-12-13 | Stop reason: ALTCHOICE

## 2018-12-13 ENCOUNTER — OFFICE VISIT (OUTPATIENT)
Dept: FAMILY MEDICINE CLINIC | Age: 28
End: 2018-12-13
Payer: COMMERCIAL

## 2018-12-13 VITALS
DIASTOLIC BLOOD PRESSURE: 70 MMHG | OXYGEN SATURATION: 99 % | HEART RATE: 75 BPM | BODY MASS INDEX: 26.93 KG/M2 | WEIGHT: 152 LBS | SYSTOLIC BLOOD PRESSURE: 108 MMHG

## 2018-12-13 DIAGNOSIS — F43.23 ADJUSTMENT DISORDER WITH MIXED ANXIETY AND DEPRESSED MOOD: ICD-10-CM

## 2018-12-13 DIAGNOSIS — Z87.440 HISTORY OF RECURRENT UTIS: ICD-10-CM

## 2018-12-13 DIAGNOSIS — R11.0 NAUSEA: ICD-10-CM

## 2018-12-13 DIAGNOSIS — R82.998 DARK URINE: Primary | ICD-10-CM

## 2018-12-13 DIAGNOSIS — N39.0 URINARY TRACT INFECTION WITHOUT HEMATURIA, SITE UNSPECIFIED: ICD-10-CM

## 2018-12-13 DIAGNOSIS — R31.9 HEMATURIA, UNSPECIFIED TYPE: ICD-10-CM

## 2018-12-13 LAB
BILIRUBIN, POC: NORMAL
BLOOD URINE, POC: NORMAL
CLARITY, POC: NORMAL
COLOR, POC: YELLOW
GLUCOSE URINE, POC: NORMAL
KETONES, POC: NORMAL
LEUKOCYTE EST, POC: NORMAL
NITRITE, POC: NORMAL
PH, POC: 6
PROTEIN, POC: NORMAL
SPECIFIC GRAVITY, POC: 1.03
UROBILINOGEN, POC: 1

## 2018-12-13 PROCEDURE — 99214 OFFICE O/P EST MOD 30 MIN: CPT | Performed by: NURSE PRACTITIONER

## 2018-12-13 PROCEDURE — 81002 URINALYSIS NONAUTO W/O SCOPE: CPT | Performed by: NURSE PRACTITIONER

## 2018-12-13 RX ORDER — CIPROFLOXACIN 500 MG/1
500 TABLET, FILM COATED ORAL 2 TIMES DAILY
Qty: 6 TABLET | Refills: 0 | Status: SHIPPED | OUTPATIENT
Start: 2018-12-13 | End: 2019-11-08 | Stop reason: SDUPTHER

## 2018-12-13 RX ORDER — ONDANSETRON 4 MG/1
4 TABLET, FILM COATED ORAL EVERY 8 HOURS PRN
Qty: 20 TABLET | Refills: 0 | Status: SHIPPED | OUTPATIENT
Start: 2018-12-13 | End: 2019-01-17 | Stop reason: SDUPTHER

## 2018-12-13 RX ORDER — METHOCARBAMOL 500 MG/1
TABLET, FILM COATED ORAL
Qty: 30 TABLET | Refills: 1 | Status: SHIPPED | OUTPATIENT
Start: 2018-12-13 | End: 2019-10-21 | Stop reason: SDUPTHER

## 2018-12-13 ASSESSMENT — PATIENT HEALTH QUESTIONNAIRE - PHQ9
8. MOVING OR SPEAKING SO SLOWLY THAT OTHER PEOPLE COULD HAVE NOTICED. OR THE OPPOSITE, BEING SO FIGETY OR RESTLESS THAT YOU HAVE BEEN MOVING AROUND A LOT MORE THAN USUAL: 2
1. LITTLE INTEREST OR PLEASURE IN DOING THINGS: 0
SUM OF ALL RESPONSES TO PHQ QUESTIONS 1-9: 13
4. FEELING TIRED OR HAVING LITTLE ENERGY: 3
7. TROUBLE CONCENTRATING ON THINGS, SUCH AS READING THE NEWSPAPER OR WATCHING TELEVISION: 0
2. FEELING DOWN, DEPRESSED OR HOPELESS: 3
9. THOUGHTS THAT YOU WOULD BE BETTER OFF DEAD, OR OF HURTING YOURSELF: 1
6. FEELING BAD ABOUT YOURSELF - OR THAT YOU ARE A FAILURE OR HAVE LET YOURSELF OR YOUR FAMILY DOWN: 1
5. POOR APPETITE OR OVEREATING: 0
3. TROUBLE FALLING OR STAYING ASLEEP: 3
SUM OF ALL RESPONSES TO PHQ QUESTIONS 1-9: 13
SUM OF ALL RESPONSES TO PHQ9 QUESTIONS 1 & 2: 3
10. IF YOU CHECKED OFF ANY PROBLEMS, HOW DIFFICULT HAVE THESE PROBLEMS MADE IT FOR YOU TO DO YOUR WORK, TAKE CARE OF THINGS AT HOME, OR GET ALONG WITH OTHER PEOPLE: 1

## 2018-12-14 ENCOUNTER — TELEPHONE (OUTPATIENT)
Dept: FAMILY MEDICINE CLINIC | Age: 28
End: 2018-12-14

## 2018-12-14 DIAGNOSIS — Z87.440 HISTORY OF RECURRENT UTIS: Primary | ICD-10-CM

## 2018-12-15 LAB — URINE CULTURE, ROUTINE: NORMAL

## 2018-12-19 ENCOUNTER — HOSPITAL ENCOUNTER (EMERGENCY)
Age: 28
Discharge: HOME | End: 2018-12-19
Payer: COMMERCIAL

## 2018-12-19 VITALS
DIASTOLIC BLOOD PRESSURE: 75 MMHG | HEIGHT: 63 IN | OXYGEN SATURATION: 98 % | HEART RATE: 68 BPM | RESPIRATION RATE: 18 BRPM | WEIGHT: 153 LBS | BODY MASS INDEX: 27.11 KG/M2 | TEMPERATURE: 97.4 F | SYSTOLIC BLOOD PRESSURE: 112 MMHG

## 2018-12-19 DIAGNOSIS — Z86.59 HISTORY OF ANXIETY: Primary | ICD-10-CM

## 2018-12-19 PROCEDURE — 99284 EMERGENCY DEPT VISIT MOD MDM: CPT

## 2018-12-19 RX ORDER — LANOLIN ALCOHOL/MO/W.PET/CERES
6 CREAM (GRAM) TOPICAL DAILY PRN
COMMUNITY
End: 2019-01-02

## 2018-12-19 RX ORDER — DIPHENHYDRAMINE HCL 25 MG
25 CAPSULE ORAL EVERY 6 HOURS PRN
COMMUNITY
End: 2019-01-02

## 2018-12-19 ASSESSMENT — ENCOUNTER SYMPTOMS
SHORTNESS OF BREATH: 0
ABDOMINAL PAIN: 0
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
## 2018-12-20 ENCOUNTER — HOSPITAL ENCOUNTER (OUTPATIENT)
Dept: PSYCHIATRY | Age: 28
Setting detail: THERAPIES SERIES
Discharge: HOME | End: 2018-12-20
Payer: COMMERCIAL

## 2018-12-20 VITALS — DIASTOLIC BLOOD PRESSURE: 81 MMHG | SYSTOLIC BLOOD PRESSURE: 108 MMHG

## 2018-12-20 NOTE — PROGRESS NOTES
sister that passed keep her from harming herself. She states \" how can I turn over and do that when my sister had no choice, I get a guilty feeling. Its christiane messed up\". She will begin IOP on the 26th she states that her husbands mother will be in town and she knows for certain that she will be able to help with the kids.

## 2018-12-28 ENCOUNTER — HOSPITAL ENCOUNTER (OUTPATIENT)
Dept: PSYCHIATRY | Age: 28
Setting detail: THERAPIES SERIES
Discharge: HOME | End: 2018-12-28
Payer: COMMERCIAL

## 2018-12-28 PROCEDURE — 90853 GROUP PSYCHOTHERAPY: CPT

## 2018-12-28 PROCEDURE — 90853 GROUP PSYCHOTHERAPY: CPT | Performed by: COUNSELOR

## 2018-12-28 NOTE — PLAN OF CARE
Problem: Altered Mood, Depressive Behavior:  Goal: Able to verbalize and/or display a decrease in depressive symptoms  Able to verbalize and/or display a decrease in depressive symptoms   Outcome: Ongoing                                                                      Group Therapy Note    Date: 12/28/2018  Start Time: 11:15am  End Time:  12:15pm  Number of Participants: 5    Type of Group: Cognitive Skills    Patient's Goal:  To learn about realistic expectations and setting realistic goals and action steps moving into the new year. Notes: Pt participated in group discussion and exploration. Pt was open to group feedback and was able to provide feedback to others. Pt appeared to benefit from this group content. Status After Intervention:  Improved    Participation Level:  Active Listener and Interactive    Participation Quality: Appropriate, Attentive, Sharing and Supportive      Speech:  normal      Thought Process/Content: Logical      Affective Functioning: Congruent      Mood: depressed      Level of consciousness:  Alert, Oriented x4 and Attentive      Response to Learning: Able to verbalize current knowledge/experience, Able to verbalize/acknowledge new learning and Able to retain information      Endings: None Reported    Modes of Intervention: Education, Support, Socialization and Exploration      Discipline Responsible: /Counselor      Signature:  CHELE Donahue

## 2019-01-02 ENCOUNTER — HOSPITAL ENCOUNTER (OUTPATIENT)
Dept: PSYCHIATRY | Age: 29
Setting detail: THERAPIES SERIES
Discharge: HOME | End: 2019-01-02
Payer: COMMERCIAL

## 2019-01-02 DIAGNOSIS — Z63.4 BEREAVEMENT: ICD-10-CM

## 2019-01-02 DIAGNOSIS — F41.9 ANXIETY DISORDER, UNSPECIFIED TYPE: ICD-10-CM

## 2019-01-02 DIAGNOSIS — G47.00 INSOMNIA, UNSPECIFIED TYPE: ICD-10-CM

## 2019-01-02 DIAGNOSIS — F32.A DEPRESSIVE DISORDER: ICD-10-CM

## 2019-01-02 PROCEDURE — 90853 GROUP PSYCHOTHERAPY: CPT | Performed by: COUNSELOR

## 2019-01-02 PROCEDURE — 99215 OFFICE O/P EST HI 40 MIN: CPT | Performed by: NURSE PRACTITIONER

## 2019-01-02 PROCEDURE — 90853 GROUP PSYCHOTHERAPY: CPT

## 2019-01-02 RX ORDER — VENLAFAXINE HYDROCHLORIDE 37.5 MG/1
37.5 CAPSULE, EXTENDED RELEASE ORAL DAILY
Qty: 30 CAPSULE | Refills: 0 | Status: SHIPPED | OUTPATIENT
Start: 2019-01-02 | End: 2019-07-05

## 2019-01-02 RX ORDER — TRAZODONE HYDROCHLORIDE 50 MG/1
50 TABLET ORAL NIGHTLY PRN
Qty: 30 TABLET | Refills: 0 | Status: SHIPPED | OUTPATIENT
Start: 2019-01-02 | End: 2019-02-06 | Stop reason: SDUPTHER

## 2019-01-02 SDOH — SOCIAL STABILITY - SOCIAL INSECURITY: DISSAPEARANCE AND DEATH OF FAMILY MEMBER: Z63.4

## 2019-01-03 ENCOUNTER — HOSPITAL ENCOUNTER (OUTPATIENT)
Dept: PSYCHIATRY | Age: 29
Setting detail: THERAPIES SERIES
Discharge: HOME | End: 2019-01-03
Payer: COMMERCIAL

## 2019-01-03 PROCEDURE — 90853 GROUP PSYCHOTHERAPY: CPT

## 2019-01-03 PROCEDURE — 90853 GROUP PSYCHOTHERAPY: CPT | Performed by: COUNSELOR

## 2019-01-04 ENCOUNTER — HOSPITAL ENCOUNTER (OUTPATIENT)
Dept: PSYCHIATRY | Age: 29
Setting detail: THERAPIES SERIES
Discharge: HOME | End: 2019-01-04
Payer: COMMERCIAL

## 2019-01-04 PROCEDURE — 90853 GROUP PSYCHOTHERAPY: CPT | Performed by: COUNSELOR

## 2019-01-04 PROCEDURE — 90853 GROUP PSYCHOTHERAPY: CPT

## 2019-01-07 ENCOUNTER — HOSPITAL ENCOUNTER (OUTPATIENT)
Dept: PSYCHIATRY | Age: 29
Setting detail: THERAPIES SERIES
Discharge: HOME | End: 2019-01-07
Payer: COMMERCIAL

## 2019-01-07 PROCEDURE — 90853 GROUP PSYCHOTHERAPY: CPT

## 2019-01-07 PROCEDURE — 90853 GROUP PSYCHOTHERAPY: CPT | Performed by: COUNSELOR

## 2019-01-09 ENCOUNTER — HOSPITAL ENCOUNTER (OUTPATIENT)
Dept: PSYCHIATRY | Age: 29
Setting detail: THERAPIES SERIES
Discharge: HOME | End: 2019-01-09
Payer: COMMERCIAL

## 2019-01-09 PROCEDURE — 90853 GROUP PSYCHOTHERAPY: CPT | Performed by: COUNSELOR

## 2019-01-09 PROCEDURE — 90853 GROUP PSYCHOTHERAPY: CPT

## 2019-01-17 ENCOUNTER — OFFICE VISIT (OUTPATIENT)
Dept: FAMILY MEDICINE CLINIC | Age: 29
End: 2019-01-17
Payer: COMMERCIAL

## 2019-01-17 VITALS
OXYGEN SATURATION: 98 % | BODY MASS INDEX: 25.86 KG/M2 | HEART RATE: 70 BPM | TEMPERATURE: 97.5 F | DIASTOLIC BLOOD PRESSURE: 80 MMHG | WEIGHT: 146 LBS | SYSTOLIC BLOOD PRESSURE: 112 MMHG

## 2019-01-17 DIAGNOSIS — R11.0 NAUSEA: ICD-10-CM

## 2019-01-17 DIAGNOSIS — R10.9 STOMACH PAIN: Primary | ICD-10-CM

## 2019-01-17 DIAGNOSIS — N39.0 URINARY TRACT INFECTION WITHOUT HEMATURIA, SITE UNSPECIFIED: ICD-10-CM

## 2019-01-17 DIAGNOSIS — R19.7 DIARRHEA, UNSPECIFIED TYPE: ICD-10-CM

## 2019-01-17 LAB
CONTROL: PRESENT
PREGNANCY TEST URINE, POC: NORMAL

## 2019-01-17 PROCEDURE — G8419 CALC BMI OUT NRM PARAM NOF/U: HCPCS | Performed by: NURSE PRACTITIONER

## 2019-01-17 PROCEDURE — 1036F TOBACCO NON-USER: CPT | Performed by: NURSE PRACTITIONER

## 2019-01-17 PROCEDURE — G8484 FLU IMMUNIZE NO ADMIN: HCPCS | Performed by: NURSE PRACTITIONER

## 2019-01-17 PROCEDURE — 81025 URINE PREGNANCY TEST: CPT | Performed by: NURSE PRACTITIONER

## 2019-01-17 PROCEDURE — 99213 OFFICE O/P EST LOW 20 MIN: CPT | Performed by: NURSE PRACTITIONER

## 2019-01-17 PROCEDURE — G8427 DOCREV CUR MEDS BY ELIG CLIN: HCPCS | Performed by: NURSE PRACTITIONER

## 2019-01-17 RX ORDER — ONDANSETRON 4 MG/1
4 TABLET, FILM COATED ORAL EVERY 8 HOURS PRN
Qty: 20 TABLET | Refills: 0 | Status: SHIPPED | OUTPATIENT
Start: 2019-01-17 | End: 2019-04-17 | Stop reason: SDUPTHER

## 2019-01-17 RX ORDER — ONDANSETRON 4 MG/1
4 TABLET, FILM COATED ORAL EVERY 8 HOURS PRN
Qty: 20 TABLET | Refills: 0 | Status: CANCELLED | OUTPATIENT
Start: 2019-01-17

## 2019-01-17 ASSESSMENT — ENCOUNTER SYMPTOMS
VOICE CHANGE: 0
FLATUS: 0
EYE DISCHARGE: 0
STRIDOR: 0
COUGH: 0
TROUBLE SWALLOWING: 0
VOMITING: 1
SHORTNESS OF BREATH: 0
COLOR CHANGE: 0
CHOKING: 0
DIARRHEA: 1
NAUSEA: 1
WHEEZING: 0
SINUS PAIN: 0
EYE ITCHING: 0
CHEST TIGHTNESS: 0
EYE PAIN: 0
BLOOD IN STOOL: 0
PHOTOPHOBIA: 0
EYE REDNESS: 0
SORE THROAT: 0
BACK PAIN: 0
ABDOMINAL PAIN: 1
RHINORRHEA: 0
SINUS PRESSURE: 0
CONSTIPATION: 0

## 2019-01-18 ENCOUNTER — HOSPITAL ENCOUNTER (OUTPATIENT)
Dept: PSYCHIATRY | Age: 29
Setting detail: THERAPIES SERIES
End: 2019-01-18
Payer: COMMERCIAL

## 2019-01-21 ENCOUNTER — HOSPITAL ENCOUNTER (OUTPATIENT)
Dept: PSYCHIATRY | Age: 29
Setting detail: THERAPIES SERIES
Discharge: HOME | End: 2019-01-21
Payer: COMMERCIAL

## 2019-02-06 ENCOUNTER — TELEPHONE (OUTPATIENT)
Dept: FAMILY MEDICINE CLINIC | Age: 29
End: 2019-02-06

## 2019-02-06 RX ORDER — TRAZODONE HYDROCHLORIDE 50 MG/1
50 TABLET ORAL NIGHTLY PRN
Qty: 30 TABLET | Refills: 3 | Status: SHIPPED | OUTPATIENT
Start: 2019-02-06 | End: 2019-06-11 | Stop reason: SDUPTHER

## 2019-03-06 RX ORDER — METHOCARBAMOL 500 MG/1
500 TABLET, FILM COATED ORAL NIGHTLY
Qty: 30 TABLET | Refills: 1 | OUTPATIENT
Start: 2019-03-06 | End: 2019-04-05

## 2019-04-17 DIAGNOSIS — R11.0 NAUSEA: ICD-10-CM

## 2019-04-17 DIAGNOSIS — R10.9 STOMACH PAIN: ICD-10-CM

## 2019-04-17 RX ORDER — ONDANSETRON 4 MG/1
4 TABLET, FILM COATED ORAL EVERY 8 HOURS PRN
Qty: 20 TABLET | Refills: 0 | Status: SHIPPED | OUTPATIENT
Start: 2019-04-17 | End: 2019-09-25

## 2019-06-11 RX ORDER — TRAZODONE HYDROCHLORIDE 50 MG/1
50 TABLET ORAL NIGHTLY PRN
Qty: 30 TABLET | Refills: 3 | Status: SHIPPED | OUTPATIENT
Start: 2019-06-11 | End: 2019-10-07 | Stop reason: SDUPTHER

## 2019-07-05 ENCOUNTER — OFFICE VISIT (OUTPATIENT)
Dept: FAMILY MEDICINE CLINIC | Age: 29
End: 2019-07-05
Payer: COMMERCIAL

## 2019-07-05 VITALS
BODY MASS INDEX: 24.52 KG/M2 | OXYGEN SATURATION: 98 % | DIASTOLIC BLOOD PRESSURE: 68 MMHG | HEART RATE: 75 BPM | WEIGHT: 138.4 LBS | HEIGHT: 63 IN | SYSTOLIC BLOOD PRESSURE: 110 MMHG

## 2019-07-05 DIAGNOSIS — F43.23 ADJUSTMENT DISORDER WITH MIXED ANXIETY AND DEPRESSED MOOD: Primary | ICD-10-CM

## 2019-07-05 DIAGNOSIS — K58.1 IRRITABLE BOWEL SYNDROME WITH CONSTIPATION: ICD-10-CM

## 2019-07-05 PROCEDURE — 99214 OFFICE O/P EST MOD 30 MIN: CPT | Performed by: FAMILY MEDICINE

## 2019-07-05 PROCEDURE — 1036F TOBACCO NON-USER: CPT | Performed by: FAMILY MEDICINE

## 2019-07-05 PROCEDURE — G8427 DOCREV CUR MEDS BY ELIG CLIN: HCPCS | Performed by: FAMILY MEDICINE

## 2019-07-05 PROCEDURE — G8420 CALC BMI NORM PARAMETERS: HCPCS | Performed by: FAMILY MEDICINE

## 2019-07-05 RX ORDER — ESCITALOPRAM OXALATE 5 MG/1
5 TABLET ORAL DAILY
Qty: 30 TABLET | Refills: 5 | Status: SHIPPED | OUTPATIENT
Start: 2019-07-05 | End: 2019-09-25 | Stop reason: ALTCHOICE

## 2019-07-05 ASSESSMENT — ENCOUNTER SYMPTOMS
ABDOMINAL PAIN: 1
SHORTNESS OF BREATH: 0
CHEST TIGHTNESS: 0
DIARRHEA: 0
CONSTIPATION: 1
BLOOD IN STOOL: 0

## 2019-09-25 ENCOUNTER — OFFICE VISIT (OUTPATIENT)
Dept: FAMILY MEDICINE CLINIC | Age: 29
End: 2019-09-25
Payer: COMMERCIAL

## 2019-09-25 VITALS
DIASTOLIC BLOOD PRESSURE: 62 MMHG | HEART RATE: 74 BPM | WEIGHT: 130 LBS | BODY MASS INDEX: 23.04 KG/M2 | OXYGEN SATURATION: 99 % | SYSTOLIC BLOOD PRESSURE: 98 MMHG

## 2019-09-25 DIAGNOSIS — O03.9 FOLLOW-UP VISIT AFTER MISCARRIAGE: ICD-10-CM

## 2019-09-25 DIAGNOSIS — R42 DIZZINESS: ICD-10-CM

## 2019-09-25 DIAGNOSIS — Z51.89 FOLLOW-UP VISIT AFTER MISCARRIAGE: ICD-10-CM

## 2019-09-25 DIAGNOSIS — R53.83 FATIGUE, UNSPECIFIED TYPE: Primary | ICD-10-CM

## 2019-09-25 LAB
A/G RATIO: 2 (ref 1.1–2.2)
ALBUMIN SERPL-MCNC: 5 G/DL (ref 3.4–5)
ALP BLD-CCNC: 53 U/L (ref 40–129)
ALT SERPL-CCNC: 17 U/L (ref 10–40)
ANION GAP SERPL CALCULATED.3IONS-SCNC: 13 MMOL/L (ref 3–16)
AST SERPL-CCNC: 23 U/L (ref 15–37)
BASOPHILS ABSOLUTE: 0 K/UL (ref 0–0.2)
BASOPHILS RELATIVE PERCENT: 0.4 %
BILIRUB SERPL-MCNC: <0.2 MG/DL (ref 0–1)
BUN BLDV-MCNC: 9 MG/DL (ref 7–20)
CALCIUM SERPL-MCNC: 9.7 MG/DL (ref 8.3–10.6)
CHLORIDE BLD-SCNC: 102 MMOL/L (ref 99–110)
CO2: 25 MMOL/L (ref 21–32)
CREAT SERPL-MCNC: <0.5 MG/DL (ref 0.6–1.1)
EOSINOPHILS ABSOLUTE: 0.1 K/UL (ref 0–0.6)
EOSINOPHILS RELATIVE PERCENT: 1.1 %
FERRITIN: 46.9 NG/ML (ref 15–150)
GFR AFRICAN AMERICAN: >60
GFR NON-AFRICAN AMERICAN: >60
GLOBULIN: 2.5 G/DL
GLUCOSE BLD-MCNC: 78 MG/DL (ref 70–99)
HCT VFR BLD CALC: 39.6 % (ref 36–48)
HEMOGLOBIN: 13 G/DL (ref 12–16)
IRON SATURATION: 31 % (ref 15–50)
IRON: 94 UG/DL (ref 37–145)
LYMPHOCYTES ABSOLUTE: 2 K/UL (ref 1–5.1)
LYMPHOCYTES RELATIVE PERCENT: 42.7 %
MCH RBC QN AUTO: 29 PG (ref 26–34)
MCHC RBC AUTO-ENTMCNC: 32.7 G/DL (ref 31–36)
MCV RBC AUTO: 88.5 FL (ref 80–100)
MONOCYTES ABSOLUTE: 0.3 K/UL (ref 0–1.3)
MONOCYTES RELATIVE PERCENT: 6.4 %
NEUTROPHILS ABSOLUTE: 2.3 K/UL (ref 1.7–7.7)
NEUTROPHILS RELATIVE PERCENT: 49.4 %
PDW BLD-RTO: 13.7 % (ref 12.4–15.4)
PLATELET # BLD: 280 K/UL (ref 135–450)
PMV BLD AUTO: 8 FL (ref 5–10.5)
POTASSIUM SERPL-SCNC: 4.8 MMOL/L (ref 3.5–5.1)
RBC # BLD: 4.47 M/UL (ref 4–5.2)
SODIUM BLD-SCNC: 140 MMOL/L (ref 136–145)
T4 FREE: 1.3 NG/DL (ref 0.9–1.8)
TOTAL IRON BINDING CAPACITY: 300 UG/DL (ref 260–445)
TOTAL PROTEIN: 7.5 G/DL (ref 6.4–8.2)
TSH SERPL DL<=0.05 MIU/L-ACNC: 1.2 UIU/ML (ref 0.27–4.2)
WBC # BLD: 4.7 K/UL (ref 4–11)

## 2019-09-25 PROCEDURE — 99213 OFFICE O/P EST LOW 20 MIN: CPT | Performed by: NURSE PRACTITIONER

## 2019-09-25 PROCEDURE — 36415 COLL VENOUS BLD VENIPUNCTURE: CPT | Performed by: NURSE PRACTITIONER

## 2019-09-25 PROCEDURE — G8420 CALC BMI NORM PARAMETERS: HCPCS | Performed by: NURSE PRACTITIONER

## 2019-09-25 PROCEDURE — G8427 DOCREV CUR MEDS BY ELIG CLIN: HCPCS | Performed by: NURSE PRACTITIONER

## 2019-09-25 PROCEDURE — 1036F TOBACCO NON-USER: CPT | Performed by: NURSE PRACTITIONER

## 2019-09-25 ASSESSMENT — ENCOUNTER SYMPTOMS
BACK PAIN: 0
STRIDOR: 0
VOMITING: 0
SORE THROAT: 0
CHOKING: 0
PHOTOPHOBIA: 0
EYE ITCHING: 0
VOICE CHANGE: 0
SINUS PAIN: 0
NAUSEA: 0
COLOR CHANGE: 0
CONSTIPATION: 0
CHEST TIGHTNESS: 0
COUGH: 0
SINUS PRESSURE: 0
SHORTNESS OF BREATH: 0
BLOOD IN STOOL: 0
DIARRHEA: 0
ABDOMINAL PAIN: 0
EYE PAIN: 0
RHINORRHEA: 0
EYE DISCHARGE: 0
WHEEZING: 0
EYE REDNESS: 0
TROUBLE SWALLOWING: 0

## 2019-09-26 DIAGNOSIS — R53.83 FATIGUE, UNSPECIFIED TYPE: Primary | ICD-10-CM

## 2019-09-26 DIAGNOSIS — R53.83 FATIGUE, UNSPECIFIED TYPE: ICD-10-CM

## 2019-09-26 LAB
VITAMIN B-12: 367 PG/ML (ref 211–911)
VITAMIN D 25-HYDROXY: 25.6 NG/ML

## 2019-09-27 RX ORDER — ERGOCALCIFEROL (VITAMIN D2) 1250 MCG
50000 CAPSULE ORAL WEEKLY
Qty: 4 CAPSULE | Refills: 3 | Status: SHIPPED | OUTPATIENT
Start: 2019-09-27 | End: 2020-07-06

## 2019-10-07 RX ORDER — TRAZODONE HYDROCHLORIDE 50 MG/1
50 TABLET ORAL NIGHTLY PRN
Qty: 30 TABLET | Refills: 3 | Status: SHIPPED | OUTPATIENT
Start: 2019-10-07 | End: 2020-02-04 | Stop reason: SDUPTHER

## 2019-10-21 DIAGNOSIS — F43.23 ADJUSTMENT DISORDER WITH MIXED ANXIETY AND DEPRESSED MOOD: ICD-10-CM

## 2019-10-21 RX ORDER — PAROXETINE 10 MG/1
10 TABLET, FILM COATED ORAL DAILY
Qty: 30 TABLET | Refills: 5 | Status: SHIPPED | OUTPATIENT
Start: 2019-10-21 | End: 2020-02-19 | Stop reason: ALTCHOICE

## 2019-10-24 ENCOUNTER — OFFICE VISIT (OUTPATIENT)
Dept: ORTHOPEDIC SURGERY | Age: 29
End: 2019-10-24
Payer: COMMERCIAL

## 2019-10-24 VITALS — BODY MASS INDEX: 23.05 KG/M2 | HEIGHT: 63 IN | WEIGHT: 130.07 LBS

## 2019-10-24 DIAGNOSIS — M22.2X1 PATELLOFEMORAL PAIN SYNDROME OF RIGHT KNEE: Primary | ICD-10-CM

## 2019-10-24 PROCEDURE — G8420 CALC BMI NORM PARAMETERS: HCPCS | Performed by: ORTHOPAEDIC SURGERY

## 2019-10-24 PROCEDURE — G8427 DOCREV CUR MEDS BY ELIG CLIN: HCPCS | Performed by: ORTHOPAEDIC SURGERY

## 2019-10-24 PROCEDURE — G8484 FLU IMMUNIZE NO ADMIN: HCPCS | Performed by: ORTHOPAEDIC SURGERY

## 2019-10-24 PROCEDURE — 99213 OFFICE O/P EST LOW 20 MIN: CPT | Performed by: ORTHOPAEDIC SURGERY

## 2019-10-24 PROCEDURE — 1036F TOBACCO NON-USER: CPT | Performed by: ORTHOPAEDIC SURGERY

## 2019-10-25 ENCOUNTER — TELEPHONE (OUTPATIENT)
Dept: ORTHOPEDIC SURGERY | Age: 29
End: 2019-10-25

## 2019-10-29 ENCOUNTER — OFFICE VISIT (OUTPATIENT)
Dept: ORTHOPEDIC SURGERY | Age: 29
End: 2019-10-29
Payer: COMMERCIAL

## 2019-10-29 VITALS
BODY MASS INDEX: 23.05 KG/M2 | HEIGHT: 63 IN | HEART RATE: 68 BPM | WEIGHT: 130.07 LBS | DIASTOLIC BLOOD PRESSURE: 52 MMHG | SYSTOLIC BLOOD PRESSURE: 104 MMHG

## 2019-10-29 DIAGNOSIS — M76.31 ILIOTIBIAL BAND SYNDROME AFFECTING LOWER LEG, RIGHT: Primary | ICD-10-CM

## 2019-10-29 DIAGNOSIS — G57.02 PIRIFORMIS SYNDROME OF LEFT SIDE: ICD-10-CM

## 2019-10-29 PROCEDURE — G8484 FLU IMMUNIZE NO ADMIN: HCPCS | Performed by: ORTHOPAEDIC SURGERY

## 2019-10-29 PROCEDURE — G8427 DOCREV CUR MEDS BY ELIG CLIN: HCPCS | Performed by: ORTHOPAEDIC SURGERY

## 2019-10-29 PROCEDURE — 99213 OFFICE O/P EST LOW 20 MIN: CPT | Performed by: ORTHOPAEDIC SURGERY

## 2019-10-29 PROCEDURE — 1036F TOBACCO NON-USER: CPT | Performed by: ORTHOPAEDIC SURGERY

## 2019-10-29 PROCEDURE — G8420 CALC BMI NORM PARAMETERS: HCPCS | Performed by: ORTHOPAEDIC SURGERY

## 2019-11-08 ENCOUNTER — OFFICE VISIT (OUTPATIENT)
Dept: FAMILY MEDICINE CLINIC | Age: 29
End: 2019-11-08
Payer: COMMERCIAL

## 2019-11-08 VITALS
OXYGEN SATURATION: 98 % | DIASTOLIC BLOOD PRESSURE: 62 MMHG | WEIGHT: 130 LBS | HEART RATE: 83 BPM | BODY MASS INDEX: 23.03 KG/M2 | SYSTOLIC BLOOD PRESSURE: 104 MMHG | TEMPERATURE: 98.8 F

## 2019-11-08 DIAGNOSIS — N30.01 ACUTE CYSTITIS WITH HEMATURIA: ICD-10-CM

## 2019-11-08 DIAGNOSIS — N39.0 URINARY TRACT INFECTION WITHOUT HEMATURIA, SITE UNSPECIFIED: ICD-10-CM

## 2019-11-08 DIAGNOSIS — R39.9 UTI SYMPTOMS: Primary | ICD-10-CM

## 2019-11-08 DIAGNOSIS — R31.9 HEMATURIA, UNSPECIFIED TYPE: ICD-10-CM

## 2019-11-08 LAB
BILIRUBIN, POC: NORMAL
BLOOD URINE, POC: NORMAL
CLARITY, POC: NORMAL
COLOR, POC: YELLOW
GLUCOSE URINE, POC: NEGATIVE
KETONES, POC: NORMAL
LEUKOCYTE EST, POC: NORMAL
NITRITE, POC: NEGATIVE
PH, POC: 5.5
PROTEIN, POC: NORMAL
SPECIFIC GRAVITY, POC: 1.02
UROBILINOGEN, POC: 0.2

## 2019-11-08 PROCEDURE — G8427 DOCREV CUR MEDS BY ELIG CLIN: HCPCS | Performed by: NURSE PRACTITIONER

## 2019-11-08 PROCEDURE — G8420 CALC BMI NORM PARAMETERS: HCPCS | Performed by: NURSE PRACTITIONER

## 2019-11-08 PROCEDURE — G8484 FLU IMMUNIZE NO ADMIN: HCPCS | Performed by: NURSE PRACTITIONER

## 2019-11-08 PROCEDURE — 1036F TOBACCO NON-USER: CPT | Performed by: NURSE PRACTITIONER

## 2019-11-08 PROCEDURE — 81002 URINALYSIS NONAUTO W/O SCOPE: CPT | Performed by: NURSE PRACTITIONER

## 2019-11-08 PROCEDURE — 99213 OFFICE O/P EST LOW 20 MIN: CPT | Performed by: NURSE PRACTITIONER

## 2019-11-08 RX ORDER — CIPROFLOXACIN 500 MG/1
500 TABLET, FILM COATED ORAL 2 TIMES DAILY
Qty: 6 TABLET | Refills: 0 | Status: SHIPPED | OUTPATIENT
Start: 2019-11-08 | End: 2019-11-11

## 2019-11-09 LAB — URINE CULTURE, ROUTINE: NORMAL

## 2019-11-22 ENCOUNTER — HOSPITAL ENCOUNTER (OUTPATIENT)
Dept: PHYSICAL THERAPY | Age: 29
Setting detail: THERAPIES SERIES
Discharge: HOME | End: 2019-11-22
Payer: COMMERCIAL

## 2019-11-22 PROCEDURE — 97140 MANUAL THERAPY 1/> REGIONS: CPT

## 2019-11-22 PROCEDURE — 97161 PT EVAL LOW COMPLEX 20 MIN: CPT

## 2019-11-22 PROCEDURE — 97110 THERAPEUTIC EXERCISES: CPT

## 2019-11-26 ENCOUNTER — HOSPITAL ENCOUNTER (OUTPATIENT)
Dept: PHYSICAL THERAPY | Age: 29
Setting detail: THERAPIES SERIES
Discharge: HOME | End: 2019-11-26
Payer: COMMERCIAL

## 2019-11-29 ENCOUNTER — OFFICE VISIT (OUTPATIENT)
Dept: FAMILY MEDICINE CLINIC | Age: 29
End: 2019-11-29
Payer: COMMERCIAL

## 2019-11-29 VITALS
HEIGHT: 63 IN | HEART RATE: 72 BPM | OXYGEN SATURATION: 98 % | DIASTOLIC BLOOD PRESSURE: 66 MMHG | BODY MASS INDEX: 22.68 KG/M2 | SYSTOLIC BLOOD PRESSURE: 104 MMHG | WEIGHT: 128 LBS

## 2019-11-29 DIAGNOSIS — F43.23 ADJUSTMENT DISORDER WITH MIXED ANXIETY AND DEPRESSED MOOD: Primary | ICD-10-CM

## 2019-11-29 PROCEDURE — 99213 OFFICE O/P EST LOW 20 MIN: CPT | Performed by: FAMILY MEDICINE

## 2019-11-29 PROCEDURE — G8484 FLU IMMUNIZE NO ADMIN: HCPCS | Performed by: FAMILY MEDICINE

## 2019-11-29 PROCEDURE — G8427 DOCREV CUR MEDS BY ELIG CLIN: HCPCS | Performed by: FAMILY MEDICINE

## 2019-11-29 PROCEDURE — 1036F TOBACCO NON-USER: CPT | Performed by: FAMILY MEDICINE

## 2019-11-29 PROCEDURE — G8420 CALC BMI NORM PARAMETERS: HCPCS | Performed by: FAMILY MEDICINE

## 2019-12-03 ENCOUNTER — HOSPITAL ENCOUNTER (OUTPATIENT)
Dept: PHYSICAL THERAPY | Age: 29
Setting detail: THERAPIES SERIES
Discharge: HOME | End: 2019-12-03
Payer: COMMERCIAL

## 2019-12-03 PROCEDURE — 97110 THERAPEUTIC EXERCISES: CPT | Performed by: PHYSICAL THERAPIST

## 2019-12-03 PROCEDURE — 97140 MANUAL THERAPY 1/> REGIONS: CPT | Performed by: PHYSICAL THERAPIST

## 2019-12-05 ENCOUNTER — HOSPITAL ENCOUNTER (OUTPATIENT)
Dept: PHYSICAL THERAPY | Age: 29
Setting detail: THERAPIES SERIES
Discharge: HOME | End: 2019-12-05
Payer: COMMERCIAL

## 2019-12-10 ENCOUNTER — HOSPITAL ENCOUNTER (OUTPATIENT)
Dept: PHYSICAL THERAPY | Age: 29
Setting detail: THERAPIES SERIES
Discharge: HOME | End: 2019-12-10
Payer: COMMERCIAL

## 2019-12-10 PROCEDURE — 97140 MANUAL THERAPY 1/> REGIONS: CPT | Performed by: PHYSICAL THERAPIST

## 2019-12-10 PROCEDURE — 97110 THERAPEUTIC EXERCISES: CPT | Performed by: PHYSICAL THERAPIST

## 2019-12-12 ENCOUNTER — HOSPITAL ENCOUNTER (OUTPATIENT)
Dept: PHYSICAL THERAPY | Age: 29
Setting detail: THERAPIES SERIES
Discharge: HOME | End: 2019-12-12
Payer: COMMERCIAL

## 2019-12-12 PROCEDURE — 97140 MANUAL THERAPY 1/> REGIONS: CPT | Performed by: PHYSICAL THERAPIST

## 2019-12-12 PROCEDURE — 97110 THERAPEUTIC EXERCISES: CPT | Performed by: PHYSICAL THERAPIST

## 2019-12-26 ENCOUNTER — HOSPITAL ENCOUNTER (OUTPATIENT)
Dept: PHYSICAL THERAPY | Age: 29
Setting detail: THERAPIES SERIES
Discharge: HOME | End: 2019-12-26
Payer: COMMERCIAL

## 2020-01-01 ENCOUNTER — HOSPITAL ENCOUNTER (EMERGENCY)
Age: 30
Discharge: HOME | End: 2020-01-01
Attending: EMERGENCY MEDICINE
Payer: COMMERCIAL

## 2020-01-01 VITALS
OXYGEN SATURATION: 100 % | TEMPERATURE: 98.4 F | HEART RATE: 72 BPM | SYSTOLIC BLOOD PRESSURE: 105 MMHG | RESPIRATION RATE: 16 BRPM | WEIGHT: 30 LBS | HEIGHT: 63 IN | DIASTOLIC BLOOD PRESSURE: 67 MMHG | BODY MASS INDEX: 5.32 KG/M2

## 2020-01-01 PROCEDURE — 99282 EMERGENCY DEPT VISIT SF MDM: CPT

## 2020-01-01 RX ORDER — HYDROCODONE BITARTRATE AND ACETAMINOPHEN 5; 325 MG/1; MG/1
1 TABLET ORAL EVERY 6 HOURS PRN
Qty: 5 TABLET | Refills: 0 | Status: SHIPPED | OUTPATIENT
Start: 2020-01-01 | End: 2020-01-08

## 2020-01-01 RX ORDER — LIDOCAINE HYDROCHLORIDE 20 MG/ML
20 SOLUTION OROPHARYNGEAL EVERY 4 HOURS PRN
Qty: 2 BOTTLE | Refills: 0 | Status: SHIPPED | OUTPATIENT
Start: 2020-01-01 | End: 2020-01-06

## 2020-01-01 RX ORDER — IBUPROFEN 600 MG/1
600 TABLET ORAL EVERY 6 HOURS PRN
Qty: 20 TABLET | Refills: 0 | Status: SHIPPED | OUTPATIENT
Start: 2020-01-01 | End: 2020-03-04 | Stop reason: ALTCHOICE

## 2020-01-01 RX ORDER — AMOXICILLIN 500 MG/1
500 CAPSULE ORAL 3 TIMES DAILY
Qty: 21 CAPSULE | Refills: 0 | Status: SHIPPED | OUTPATIENT
Start: 2020-01-01 | End: 2020-01-08

## 2020-01-01 ASSESSMENT — PAIN DESCRIPTION - PAIN TYPE: TYPE: ACUTE PAIN

## 2020-01-01 ASSESSMENT — ENCOUNTER SYMPTOMS
FACIAL SWELLING: 0
ABDOMINAL PAIN: 0
SHORTNESS OF BREATH: 0
BACK PAIN: 0

## 2020-01-01 ASSESSMENT — PAIN DESCRIPTION - ONSET: ONSET: SUDDEN

## 2020-01-01 ASSESSMENT — PAIN SCALES - GENERAL: PAINLEVEL_OUTOF10: 6

## 2020-01-01 ASSESSMENT — PAIN DESCRIPTION - PROGRESSION: CLINICAL_PROGRESSION: NOT CHANGED

## 2020-01-01 ASSESSMENT — PAIN DESCRIPTION - DESCRIPTORS: DESCRIPTORS: SHOOTING;PRESSURE

## 2020-01-01 ASSESSMENT — PAIN - FUNCTIONAL ASSESSMENT: PAIN_FUNCTIONAL_ASSESSMENT: PREVENTS OR INTERFERES SOME ACTIVE ACTIVITIES AND ADLS

## 2020-01-01 ASSESSMENT — PAIN DESCRIPTION - LOCATION: LOCATION: TEETH

## 2020-01-01 ASSESSMENT — PAIN DESCRIPTION - FREQUENCY: FREQUENCY: CONTINUOUS

## 2020-01-01 NOTE — ED PROVIDER NOTES
negative. Except as noted above the remainder of the review of systems was reviewed and negative.        PAST MEDICAL HISTORY     Past Medical History:   Diagnosis Date    Anemia     Anxiety     Depression     History of recurrent UTI (urinary tract infection)     Infections of kidney     Kidney stone 2011    Miscarriage     Other disorders of kidney and ureter     2 ureters on R side    Status post cystoscopy 11         SURGICAL HISTORY       Past Surgical History:   Procedure Laterality Date     SECTION  2014    X 2;  &    Dev [place]  [id number]    Darrian [address]         CURRENT MEDICATIONS       Discharge Medication List as of 2020  3:29 PM      CONTINUE these medications which have NOT CHANGED    Details   cariprazine hcl (VRAYLAR) 1.5 MG capsule Take 1 capsule by mouth daily, Disp-30 capsule, R-1Normal      PARoxetine (PAXIL) 10 MG tablet Take 1 tablet by mouth daily, Disp-30 tablet, R-5Normal      methocarbamol (ROBAXIN) 500 MG tablet Take 1 tablet by mouth 3 times daily, Disp-90 tablet, R-1Generic For:*ROBAXIN   500MG   N O T I C E    PRESCRIPTION PREVIOUSLY AUTHORIZED BY DOCTOR:ROCIO CRAIG [phone]      traZODone (DESYREL) 50 MG tablet TAKE 1 TABLET BY MOUTH NIGHTLY AS NEEDED FOR SLEEP, Disp-30 tablet, R-3Normal      ergocalciferol (ERGOCALCIFEROL) 52472 units capsule Take 1 capsule by mouth once a week, Disp-4 capsule, R-3Normal      Prenatal Vit-DSS-Fe Cbn-FA (PRENATAL ADVANTAGE PO) PrenatalHistorical Med             ALLERGIES     Hydromorphone hcl and Dilaudid [hydromorphone hcl]    FAMILY HISTORY       Family History   Problem Relation Age of Onset    Kidney Disease Mother     Crohn's Disease Father     Urolithiasis Father     Urolithiasis Sister     Diabetes Maternal Grandmother     Heart Disease Maternal Grandmother     Diabetes Paternal Grandmother     Diabetes Paternal Grandfather     Heart Disease Paternal Grandfather SOCIAL HISTORY       Social History     Socioeconomic History    Marital status:      Spouse name: None    Number of children: None    Years of education: None    Highest education level: None   Occupational History    Occupation: homemaker   Social Needs    Financial resource strain: None    Food insecurity:     Worry: None     Inability: None    Transportation needs:     Medical: None     Non-medical: None   Tobacco Use    Smoking status: Never Smoker    Smokeless tobacco: Never Used   Substance and Sexual Activity    Alcohol use: No    Drug use: No    Sexual activity: Yes     Partners: Male   Lifestyle    Physical activity:     Days per week: None     Minutes per session: None    Stress: None   Relationships    Social connections:     Talks on phone: None     Gets together: None     Attends Denominational service: None     Active member of club or organization: None     Attends meetings of clubs or organizations: None     Relationship status: None    Intimate partner violence:     Fear of current or ex partner: None     Emotionally abused: None     Physically abused: None     Forced sexual activity: None   Other Topics Concern    None   Social History Narrative    None       SCREENINGS    [place] Coma Scale  Eye Opening: Spontaneous  Best Verbal Response: Oriented  Best Motor Response: Obeys commands  Ben Coma Scale Score: 15          PHYSICAL EXAM    (up to 7 for level 4, 8 or more for level 5)     ED Triage Vitals [01/01/20 1458]   BP Temp Temp Source Pulse Resp SpO2 Height Weight   105/67 98.4 °F (36.9 °C) Oral 72 16 100 % 5' 3\" (1.6 m) 30 lb (13.6 kg)       Physical Exam  Vitals signs and nursing note reviewed. Constitutional:       General: She is not in acute distress. Appearance: She is normal weight. She is not ill-appearing or diaphoretic. HENT:      Head: Normocephalic and atraumatic. No left periorbital erythema. Jaw: There is normal jaw occlusion. Right Ear: Tympanic membrane, ear canal and external ear normal.      Left Ear: Tympanic membrane, ear canal and external ear normal.      Nose: Nose normal.      Mouth/Throat:      Mouth: Mucous membranes are moist.      Pharynx: No oropharyngeal exudate or posterior oropharyngeal erythema. Eyes:      Extraocular Movements: Extraocular movements intact. Pupils: Pupils are equal, round, and reactive to light. Neck:      Musculoskeletal: Normal range of motion. No edema or erythema. Neurological:      Mental Status: She is alert. DIAGNOSTIC RESULTS     EKG: All EKG's are interpreted by the Emergency Department Physician who either signs or Co-signs this chart in the absence of a cardiologist.        RADIOLOGY:   Non-plain film images such as CT, Ultrasound and MRI are read by the radiologist. Plain radiographic images are visualized and preliminarily interpreted by the emergency physician with the below findings:        Interpretation per the Radiologist below, if available at the time of this note:    No orders to display           LABS:  No results found for this visit on 01/01/20. EMERGENCY DEPARTMENT COURSE and DIFFERENTIAL DIAGNOSIS/MDM:     Vitals:    01/01/20 1458   BP: 105/67   Pulse: 72   Resp: 16   Temp: 98.4 °F (36.9 °C)   TempSrc: Oral   SpO2: 100%   Weight: 30 lb (13.6 kg)   Height: 5' 3\" (1.6 m)           MDM      REASSESSMENT          CRITICAL CARE TIME     CONSULTS:  None      PROCEDURES:     Procedures    MEDICATIONS GIVEN THIS VISIT:  Medications - No data to display     FINAL IMPRESSION      1.  Dental infection            DISPOSITION/PLAN   DISPOSITION Decision To Discharge 01/01/2020 03:21:19 PM      PATIENT REFERRED TO:  Your dentist      asap      DISCHARGE MEDICATIONS:  Discharge Medication List as of 1/1/2020  3:29 PM      START taking these medications    Details   lidocaine viscous hcl (XYLOCAINE) 2 % SOLN solution Take 20 mLs by mouth every 4 hours as needed for Dental Pain or Pain Take 15 mLs by mouth every 4 hours as needed for Irritation or Pain. You can swish and swallow or gargle, Disp-2 Bottle, R-0Print      amoxicillin (AMOXIL) 500 MG capsule Take 1 capsule by mouth 3 times daily for 7 days, Disp-21 capsule, R-0Print      ibuprofen (ADVIL;MOTRIN) 600 MG tablet Take 1 tablet by mouth every 6 hours as needed for Pain or Fever, Disp-20 tablet, R-0Print      HYDROcodone-acetaminophen (NORCO) 5-325 MG per tablet Take 1 tablet by mouth every 6 hours as needed for Pain for up to 5 doses. , Disp-5 tablet, R-0Print             Controlled Substances Monitoring  RX Monitoring 6/11/2019   Periodic Controlled Substance Monitoring No signs of potential drug abuse or diversion identified. (Please note that portions of this note were completed with a voice recognition program.  Efforts were made to edit the dictations but occasionally words are mis-transcribed.)    Patient was advised to return to the Emergency Department if there was any worsening.     Becca Lindsey MD (electronically signed)  Attending Emergency Physician         Di Gill MD  01/01/20 2299

## 2020-01-02 ENCOUNTER — HOSPITAL ENCOUNTER (OUTPATIENT)
Dept: PHYSICAL THERAPY | Age: 30
Setting detail: THERAPIES SERIES
Discharge: HOME | End: 2020-01-02
Payer: COMMERCIAL

## 2020-01-02 PROCEDURE — 97140 MANUAL THERAPY 1/> REGIONS: CPT | Performed by: PHYSICAL THERAPIST

## 2020-01-02 PROCEDURE — 97110 THERAPEUTIC EXERCISES: CPT | Performed by: PHYSICAL THERAPIST

## 2020-01-02 NOTE — FLOWSHEET NOTE
[facility], [address] [place] [address], [facility]    Physical Therapy Treatment Note/ Progress Report:     Date:  2020    Patient Name:  Caryl Jackson    :  1990  MRN: [id number]  Restrictions/Precautions:    Medical/Treatment Diagnosis Information:  · Diagnosis: Left Piriformis syndrome; Right IT Band Syndrome  · Treatment Diagnosis: G57.02, K20.90  Insurance/Certification information:  [facility]  Physician Information:  Referring Practitioner: Karyn Chi  Has the plan of care been signed (Y/N):        []  Yes  [x]  No     Date of Patient follow up with Physician: no follow up yet     Is this a Progress Report:     []  Yes  [x]  No       If Yes:  Date Range for reporting period:  Initial Eval: 19  Beginnin19  Endin19    Progress report will be due (10 Rx or 30 days whichever is less):      Recertification will be due (POC Duration  / 90 days whichever is less): 20     Visit # Insurance Allowable Auth Required    ()     () Med Dasie Folks    Med Dasie Folks []  Yes []  No      Functional Scale: LEFS: 36%  / Mod Oswestry: 22% Date assessed:  19      Latex Allergy:  [x]NO      []YES  Preferred Language for Healthcare:   [x]English       []other:    Pain level:   2/10 left hip;  0-1/10 right knee    SUBJECTIVE:   \"Since I've been out the last 2 weeks I've been pretty sore without the DN\"    OBJECTIVE: See eval   Observation:    Test measurements:      RESTRICTIONS/PRECAUTIONS: n/a    Exercises/Interventions:   Therapeutic Ex (52531)  Therapeutic Activity (97187)  NMR re-education (82387) Sets/Reps Notes/CUES   Bike          SAQ/LAQ 20 x ea         Piriformis stretch 3 x 30\" ea    IT Band Stretch SL Ant/Post 3 x 30\" ea    Supine HS Stretch 3 x 30\"         George Stretch 3x30\" W/ 5# weight                                 Dry needling 10 min                                            Manual Intervention ([phone]) Manual Stretching 15'                                                 Patient Education 5' HEP ed with patient       Therapeutic Exercise and NMR EXR  [x] (85833) Provided verbal/tactile cueing for activities related to strengthening, flexibility, endurance, ROM for improvements in LE, proximal hip, and core control with self care, mobility, lifting, ambulation. [x] (49692) Provided verbal/tactile cueing for activities related to improving balance, coordination, kinesthetic sense, posture, motor skill, proprioception to assist with LE, proximal hip, and core control in self-care, mobility, lifting, ambulation and eccentric single leg control. NMR and Therapeutic Activities:    [x] (79292 or 90112) Provided verbal/tactile cueing for activities related to improving balance, coordination, kinesthetic sense, posture, motor skill, proprioception and motor activation to allow for proper function of core, proximal hip and LE with self-care and ADLs and functional mobility.    [x] (38932) Gait Re-education- Provided training and instruction to the patient for proper LE, core and proximal hip recruitment and positioning and eccentric body weight control with ambulation re-education including up and down stairs     Home Exercise Program:    [x] (03319) Reviewed/Progressed HEP activities related to strengthening, flexibility, endurance, ROM of core, proximal hip and LE for functional self-care, mobility, lifting and ambulation/stair navigation   [x] (86282) Reviewed/Progressed HEP activities related to improving balance, coordination, kinesthetic sense, posture, motor skill, proprioception of core, proximal hip and LE for self-care, mobility, lifting, and ambulation/stair navigation      Manual Treatments:  PROM / STM / Oscillations-Mobs:  G-I, II, III, IV (PA's, Inf., Post.)  [x] (11741) Provided manual therapy to mobilize LE, proximal hip and/or LS spine soft tissue/joints for the purpose of modulating pain, promoting recommendations for exercise. **    Modalities:      Charges:  Timed Code Treatment Minutes: 55   Total Treatment Minutes:  55   BWC:  TE TIME:  NMR TIME:  MANUAL TIME:  UNTIMED MINUTES:   -  -  -  -      [] EVAL (LOW) 44456 (typically 20 minutes face-to-face)  [] EVAL (MOD) 75301 (typically 30 minutes face-to-face)  [] EVAL (HIGH) 41287 (typically 45 minutes face-to-face)  [] RE-EVAL     [x] VI(82754) x   2  [] IONTO  [] NMR (54604) x     [] VASO  [x] Manual (82418) x 2  [] Other:  [] TA x      [] Mech Traction (56664)  [] ES(attended) (39545)      [] ES (un) (90869):    ASSESSMENT:  See eval    GOALS:   Short Term Goals: To be achieved in: 2 weeks  1. Independent in HEP and progression per patient tolerance, in order to prevent re-injury. [] Progressing: [] Met: [] Not Met: [] Adjusted   2. Patient will have a decrease in pain to facilitate improvement in movement, function, and ADLs as indicated by Functional Deficits. [] Progressing: [] Met: [] Not Met: [] Adjusted     Long Term Goals: To be achieved in: 12 weeks  1. Disability index score of 20% or less for the LEFS to assist with reaching prior level of function. [] Progressing: [] Met: [] Not Met: [] Adjusted   2. Patient will demonstrate increased AROM to equal the opposite side bilaterally to allow for proper joint functioning as indicated by patients Functional Deficits. [] Progressing: [] Met: [] Not Met: [] Adjusted   3. Patient will demonstrate an increase in strength of B LE to 4+/5 to allow for proper functional mobility as indicated by patients Functional Deficits. [] Progressing: [] Met: [] Not Met: [] Adjusted   4. Patient will return to all transfers, work activities, and functional activities without increased symptoms or restriction. [] Progressing: [] Met: [] Not Met: [] Adjusted   5. Patient will have 0/10 pain with ADL's.  [] Progressing: [] Met: [] Not Met: [] Adjusted   6.  Patient stated goal: to decrease pain to be more

## 2020-01-07 ENCOUNTER — HOSPITAL ENCOUNTER (OUTPATIENT)
Dept: PHYSICAL THERAPY | Age: 30
Setting detail: THERAPIES SERIES
Discharge: HOME | End: 2020-01-07
Payer: COMMERCIAL

## 2020-01-07 PROCEDURE — 97140 MANUAL THERAPY 1/> REGIONS: CPT | Performed by: PHYSICAL THERAPIST

## 2020-01-07 PROCEDURE — 97110 THERAPEUTIC EXERCISES: CPT | Performed by: PHYSICAL THERAPY ASSISTANT

## 2020-01-07 PROCEDURE — 97140 MANUAL THERAPY 1/> REGIONS: CPT | Performed by: PHYSICAL THERAPY ASSISTANT

## 2020-01-07 NOTE — FLOWSHEET NOTE
[facility], [address] [place], [address]  Date: 2020          Patient Name; :  Tamica Montiel; 1990   Dx/ICD Code: Left Piriformis syndrome; Right IT Band Syndrome  Treatment Diagnosis: G57.02, M76.31       Physician: Mitchel Nelson PT Visits: 6     Measures Previous Current   Pain (0-10) 7 3   Disability % 36% LEFS 60/80 = 25% LEFS   ROM     Hip ROM  Symmetrical throughout        Strength     Hip flexion  4   Hip abduction  4     Specific Functional Improvements & Impressions:  Patient subjectively and objectively reports progress. Strength is improving but not adequate to sustain prolonged walking or standing. Plan & Recommendations:  [x] Continue rehabilitation due to objective improvement and continued functional deficits with frequency and duration: 1-2x week - 4 weeks  [] Progress toward  []GAP, []Work Conditioning, []Independent HEP   [] Discharge due to   [] All goals achieved, [] Maximized \"medical necessity\" [] No subjective or objective improvements      Electronically signed by:  Jeff Soni PTA / Sybil Orozco, PT, DPT, OMT-C, Melody Carney  [phone]      Therapy Plan of Care Re-Certification  This patient has been re-evaluated for physical therapy services and for therapy to continue, Medicare, Medicaid and other insurances require periodic physician review of the treatment plan.  Please review the above re-evaluation and verify that you agree with plan of care as established above by signing the attached document and return it to our office or note changes to established plan below  [] Follow treatment plan as above [] Discontinue physical therapy  [] Change plan to:                                 __________________________________________________    Physician Signature:____________________________________ Date:____________  By signing above, therapists plan is approved by physician    If you have any questions or concerns, please don't Adjusted   5. Patient will have 0/10 pain with ADL's. [x] Progressing: [] Met: [] Not Met: [] Adjusted   6. Patient stated goal: to decrease pain to be more active  [] Progressing: [x] Met: [] Not Met: [] Adjusted     Overall Progression Towards Functional goals/ Treatment Progress Update:  [x] Patient is progressing as expected towards functional goals listed. [] Progression is slowed due to complexities/Impairments listed. [] Progression has been slowed due to co-morbidities. [] Plan just implemented, too soon to assess goals progression <30days   [] Goals require adjustment due to lack of progress  [] Patient is not progressing as expected and requires additional follow up with physician  [] Other    Prognosis for POC: [x] Good [] Fair  [] Poor      Patient requires continued skilled intervention: [x] Yes  [] No    Treatment/Activity Tolerance:  [x] Patient able to complete treatment  [] Patient limited by fatigue  [] Patient limited by pain    [] Patient limited by other medical complications  [] Other:     Return to Play: (if applicable)   []  Stage 1: Intro to Strength   []  Stage 2: Return to Run and Strength   []  Stage 3: Return to Jump and Strength   []  Stage 4: Dynamic Strength and Agility   []  Stage 5: Sport Specific Training     []  Ready to Return to Play, Meets All Above Stages   []  Not Ready for Return to Sports   Comments:                         PLAN:   [x] Continue per plan of care [] Alter current plan (see comments above)  [] Plan of care initiated [] Hold pending MD visit [] Discharge    Electronically signed by:  Fer Willson PTA / Bautista Quintero, PT, DPT, OMT-CHarris  [phone]        Note: If patient does not return for scheduled/ recommended follow up visits, this note will serve as a discharge from care along with most recent update on progress.

## 2020-01-09 ENCOUNTER — HOSPITAL ENCOUNTER (OUTPATIENT)
Dept: PHYSICAL THERAPY | Age: 30
Setting detail: THERAPIES SERIES
Discharge: HOME | End: 2020-01-09
Payer: COMMERCIAL

## 2020-01-09 NOTE — FLOWSHEET NOTE
[facility] ([facility])    Physical Therapy  Cancellation/No-show Note  Patient Name:  Jose Mann  :  1990   Date:  2020    Cancelled visits to date: 3  No-shows to date: 0    For today's appointment patient:  [x]  Cancelled  []  Rescheduled appointment  []  No-show     Reason given by patient:  [x]  Patient kids ill  []  Conflicting appointment  []  No transportation    []  Conflict with work  []  No reason given  []  Other:     Comments:      Phone call information:   []  Phone call made today to patient. []  Patient answered, conversation as follows:    []  Patient did not answer. []  Phone call not made today  [x]  Phone call not needed - pt contacted us to cancel and provided reason for cancellation.      Electronically signed by:  Lucas Arreola PT

## 2020-01-14 ENCOUNTER — HOSPITAL ENCOUNTER (OUTPATIENT)
Dept: PHYSICAL THERAPY | Age: 30
Setting detail: THERAPIES SERIES
Discharge: HOME | End: 2020-01-14
Payer: COMMERCIAL

## 2020-01-14 PROCEDURE — 97140 MANUAL THERAPY 1/> REGIONS: CPT | Performed by: PHYSICAL THERAPIST

## 2020-01-14 PROCEDURE — 97110 THERAPEUTIC EXERCISES: CPT | Performed by: PHYSICAL THERAPIST

## 2020-01-14 NOTE — FLOWSHEET NOTE
[facility], [address] [place] [address] Clemente Singh, [address]    Physical Therapy Treatment Note/ Progress Report:     Date:  2020    Patient Name:  Deacon Murphy    :  1990  MRN: [id number]  Restrictions/Precautions:    Medical/Treatment Diagnosis Information:  · Diagnosis: Left Piriformis syndrome; Right IT Band Syndrome  · Treatment Diagnosis: G57.02, W61.47  Insurance/Certification information:  PT Insurance Information: [employer]  Physician Information:  Referring Practitioner: Yves Cowden  Has the plan of care been signed (Y/N):        []  Yes  [x]  No     Date of Patient follow up with Physician: no follow up yet     Is this a Progress Report:     []  Yes  [x]  No       If Yes:  Date Range for reporting period:  Initial Eval: 19    Beginnin2020 (ending 2020)      Progress report will be due (10 Rx or 30 days whichever is less): 2020 or visit 12    Recertification will be due (POC Duration  / 90 days whichever is less): 20     Visit # Insurance Allowable Auth Required   3 ()    4 () Med Audelia Overall    Med Audelia Overall []  Yes []  No      Functional Scale: LEFS: 36%  / Mod Oswestry: 22% Date assessed:  19    Functional Scale: LEFS: 60/80 25%   Date assessed: 2020    Latex Allergy:  [x]NO      []YES  Preferred Language for Healthcare:   [x]English       []other:    Pain level:   2/10 left hip;  0-1/10 right knee    SUBJECTIVE:   \"still progressing but I was sore after last visit with the new exercises\"    OBJECTIVE: See eval   Observation:    Test measurements:      RESTRICTIONS/PRECAUTIONS: n/a    Exercises/Interventions:   Therapeutic Ex (07307)  Therapeutic Activity (57259)  NMR re-education (87744) Sets/Reps Notes/CUES   Bike x5'         SAQ/LAQ 20 x ea    Bridges 5\" x10    HL clamshells Lime x15 ea 2 way   SL clamshells x10 0 resistance        Piriformis stretch 3 x 30\" ea    IT Band Stretch SL Ant/Post 3 x 30\" ea    Supine HS Stretch 3 x 30\" and/or written approval for this treatment. **    ** Educated patient on anatomy, trigger point etiology, expectations for TDN (bruising, soreness, etc), outcomes, and recommendations for exercise. **    Modalities:      Charges:  Timed Code Treatment Minutes: 55   Total Treatment Minutes:  55   BWC:  TE TIME:  NMR TIME:  MANUAL TIME:  UNTIMED MINUTES:   -  -  -  -      [] EVAL (LOW) 83884 (typically 20 minutes face-to-face)  [] EVAL (MOD) 91799 (typically 30 minutes face-to-face)  [] EVAL (HIGH) 32749 (typically 45 minutes face-to-face)  [] RE-EVAL     [x] UG(55177) x   2  [] IONTO  [] NMR (60831) x     [] VASO  [x] Manual (02845) x 2  [] Other:  [] TA x      [] Mech Traction (54403)  [] ES(attended) (01277)      [] ES (un) (83640):    ASSESSMENT:  See eval    GOALS:   Short Term Goals: To be achieved in: 2 weeks  1. Independent in HEP and progression per patient tolerance, in order to prevent re-injury. [] Progressing: [x] Met: [] Not Met: [] Adjusted   2. Patient will have a decrease in pain to facilitate improvement in movement, function, and ADLs as indicated by Functional Deficits. [] Progressing: [x] Met: [] Not Met: [] Adjusted     Long Term Goals: To be achieved in: 12 weeks  1. Disability index score of 20% or less for the LEFS to assist with reaching prior level of function. [] Progressing: [] Met: [] Not Met: [] Adjusted   2. Patient will demonstrate increased AROM to equal the opposite side bilaterally to allow for proper joint functioning as indicated by patients Functional Deficits. [] Progressing: [x] Met: [] Not Met: [] Adjusted   3. Patient will demonstrate an increase in strength of B LE to 4+/5 to allow for proper functional mobility as indicated by patients Functional Deficits. [x] Progressing: [] Met: [] Not Met: [] Adjusted   4. Patient will return to all transfers, work activities, and functional activities without increased symptoms or restriction.    [x] Progressing: [] Met: [] Not Met: [] Adjusted   5. Patient will have 0/10 pain with ADL's. [x] Progressing: [] Met: [] Not Met: [] Adjusted   6. Patient stated goal: to decrease pain to be more active  [] Progressing: [x] Met: [] Not Met: [] Adjusted     Overall Progression Towards Functional goals/ Treatment Progress Update:  [x] Patient is progressing as expected towards functional goals listed. [] Progression is slowed due to complexities/Impairments listed. [] Progression has been slowed due to co-morbidities. [] Plan just implemented, too soon to assess goals progression <30days   [] Goals require adjustment due to lack of progress  [] Patient is not progressing as expected and requires additional follow up with physician  [] Other    Prognosis for POC: [x] Good [] Fair  [] Poor      Patient requires continued skilled intervention: [x] Yes  [] No    Treatment/Activity Tolerance:  [x] Patient able to complete treatment  [] Patient limited by fatigue  [] Patient limited by pain    [] Patient limited by other medical complications  [] Other:     Return to Play: (if applicable)   []  Stage 1: Intro to Strength   []  Stage 2: Return to Run and Strength   []  Stage 3: Return to Jump and Strength   []  Stage 4: Dynamic Strength and Agility   []  Stage 5: Sport Specific Training     []  Ready to Return to Play, Meets All Above Stages   []  Not Ready for Return to Sports   Comments:                         PLAN:   [x] Continue per plan of care [] Alter current plan (see comments above)  [] Plan of care initiated [] Hold pending MD visit [] Discharge    Electronically signed by:  Kasey Jara PT, PTA / Kasey Jara PT, DPT, OMT-C, Lucas Olp  [id number]        Note: If patient does not return for scheduled/ recommended follow up visits, this note will serve as a discharge from care along with most recent update on progress.

## 2020-01-17 ENCOUNTER — HOSPITAL ENCOUNTER (OUTPATIENT)
Dept: PHYSICAL THERAPY | Age: 30
Setting detail: THERAPIES SERIES
Discharge: HOME | End: 2020-01-17
Payer: COMMERCIAL

## 2020-01-17 PROCEDURE — 97140 MANUAL THERAPY 1/> REGIONS: CPT | Performed by: PHYSICAL THERAPIST

## 2020-01-17 PROCEDURE — 97110 THERAPEUTIC EXERCISES: CPT | Performed by: PHYSICAL THERAPIST

## 2020-01-17 NOTE — FLOWSHEET NOTE
[facility], [address] [place] [address] Clemente Singh, [address]    Physical Therapy Treatment Note/ Progress Report:     Date:  2020    Patient Name:  Shayne Cordero    :  1990  MRN: [id number]  Restrictions/Precautions:    Medical/Treatment Diagnosis Information:  · Diagnosis: Left Piriformis syndrome; Right IT Band Syndrome  · Treatment Diagnosis: G57.02, W66.88  Insurance/Certification information:  [facility]  Physician Information:  Referring Practitioner: Remy Arreaga  Has the plan of care been signed (Y/N):        []  Yes  [x]  No     Date of Patient follow up with Physician: no follow up yet     Is this a Progress Report:     []  Yes  [x]  No       If Yes:  Date Range for reporting period:  Initial Eval: 19    Beginnin2020 (ending 2020)    Progress report will be due (10 Rx or 30 days whichever is less): 2020 or visit 12    Recertification will be due (POC Duration  / 90 days whichever is less): 20     Visit # Insurance Allowable Auth Required   4 ()    4 () Med Sole Dakin    Med Sole Dakin []  Yes []  No      Functional Scale: LEFS: 36%  / Mod Oswestry: 22% Date assessed:  19    Functional Scale: LEFS: 60/80 25%   Date assessed: 2020    Latex Allergy:  [x]NO      []YES  Preferred Language for Healthcare:   [x]English       []other:    Pain level:   4/10 left hip;  0/10 right knee    SUBJECTIVE:   Less sore today due to the decrease in stretching.       OBJECTIVE: See eval   Observation:    Test measurements:      RESTRICTIONS/PRECAUTIONS: n/a    Exercises/Interventions:   Therapeutic Ex (78160)  Therapeutic Activity (45155)  NMR re-education (54789) Sets/Reps Notes/CUES   Bike x5'         /LAQ 20 x ea       SL clamshells Lime x 10 each         Piriformis stretch 3 x 30\" ea    IT Band Stretch SL Ant/Post 3 x 30\" ea    Supine HS Stretch 3 x 30\"         George Stretch  3x30\" W/ 5# weight                                 Dry needling 10 min                                            Manual Intervention (86985)     Manual Stretching 15'                                                 Patient Education 5' HEP ed with patient       Therapeutic Exercise and NMR EXR  [x] (77096) Provided verbal/tactile cueing for activities related to strengthening, flexibility, endurance, ROM for improvements in LE, proximal hip, and core control with self care, mobility, lifting, ambulation. [x] (87451) Provided verbal/tactile cueing for activities related to improving balance, coordination, kinesthetic sense, posture, motor skill, proprioception to assist with LE, proximal hip, and core control in self-care, mobility, lifting, ambulation and eccentric single leg control. NMR and Therapeutic Activities:    [x] (35500 or 86590) Provided verbal/tactile cueing for activities related to improving balance, coordination, kinesthetic sense, posture, motor skill, proprioception and motor activation to allow for proper function of core, proximal hip and LE with self-care and ADLs and functional mobility.    [x] (19118) Gait Re-education- Provided training and instruction to the patient for proper LE, core and proximal hip recruitment and positioning and eccentric body weight control with ambulation re-education including up and down stairs     Home Exercise Program:    [x] (78363) Reviewed/Progressed HEP activities related to strengthening, flexibility, endurance, ROM of core, proximal hip and LE for functional self-care, mobility, lifting and ambulation/stair navigation   [x] (87250) Reviewed/Progressed HEP activities related to improving balance, coordination, kinesthetic sense, posture, motor skill, proprioception of core, proximal hip and LE for self-care, mobility, lifting, and ambulation/stair navigation      Manual Treatments:  PROM / STM / Oscillations-Mobs:  G-I, II, III, IV (PA's, Inf., Post.)  [x] (62935) Provided manual therapy to mobilize LE,

## 2020-01-21 ENCOUNTER — HOSPITAL ENCOUNTER (OUTPATIENT)
Dept: PHYSICAL THERAPY | Age: 30
Setting detail: THERAPIES SERIES
Discharge: HOME | End: 2020-01-21
Payer: COMMERCIAL

## 2020-01-21 PROCEDURE — 97140 MANUAL THERAPY 1/> REGIONS: CPT | Performed by: PHYSICAL THERAPIST

## 2020-01-21 PROCEDURE — 97110 THERAPEUTIC EXERCISES: CPT | Performed by: PHYSICAL THERAPIST

## 2020-01-21 NOTE — FLOWSHEET NOTE
[facility], [address] [place] [address] [address] [address]    Physical Therapy Treatment Note/ Progress Report:     Date:  2020    Patient Name:  Cristian Arroyo    :  1990  MRN: [id number]  Restrictions/Precautions:    Medical/Treatment Diagnosis Information:  · Diagnosis: Left Piriformis syndrome; Right IT Band Syndrome  · Treatment Diagnosis: G57.02, F92.60  Insurance/Certification information:  [facility]  Physician Information:  Referring Practitioner: Marlys Campos  Has the plan of care been signed (Y/N):        []  Yes  [x]  No     Date of Patient follow up with Physician: no follow up yet     Is this a Progress Report:     []  Yes  [x]  No       If Yes:  Date Range for reporting period:  Initial Eval: 19    Beginnin2020 (ending 2020)    Progress report will be due (10 Rx or 30 days whichever is less): 2020 or visit 12    Recertification will be due (POC Duration  / 90 days whichever is less): 20     Visit # Insurance Allowable Auth Required   5 ()    4 () 30    Med nec []  Yes []  No      Functional Scale: LEFS: 36%  / Mod Oswestry: 22% Date assessed:  19    Functional Scale: LEFS: 60/80 25%   Date assessed: 2020    Latex Allergy:  [x]NO      []YES  Preferred Language for Healthcare:   [x]English       []other:    Pain level:   1/10 left hip;  0/10 right knee    SUBJECTIVE:   \"I'm feeling really good lately but I'm worried when I stop therapy, the pain will come back. \"    OBJECTIVE: See eval   Observation:    Test measurements:      RESTRICTIONS/PRECAUTIONS: n/a    Exercises/Interventions:   Therapeutic Ex (37433)  Therapeutic Activity (49396)  NMR re-education (09767) Sets/Reps Notes/CUES   Bike x5'         /LAQ 20 x ea       SL clamshells Lime x 10 each         Piriformis stretch 3 x 30\" ea    IT Band Stretch SL Ant/Post 3 x 30\" ea    Supine HS Stretch 3 x 30\"         George Stretch  3x30\" W/ 5# weight Oscillations-Mobs:  G-I, II, III, IV (PA's, Inf., Post.)  [x] (38986) Provided manual therapy to mobilize LE, proximal hip and/or LS spine soft tissue/joints for the purpose of modulating pain, promoting relaxation, increasing ROM, reducing/eliminating soft tissue swelling/inflammation/restriction, improving soft tissue extensibility and allowing for proper ROM for normal function with self-care, mobility, lifting and ambulation. Consent signed 12/3/19 and precautions addressed. See media tab. Muscle  Needle Size Technique Notes IES   Site 1 Glute Med (bilateral) 0.30 x 50mm [x] Pistoning / []  Threading  []  Winding/Coning Contractions ? []    Site 2 Piriformis (left side) 0.30 x 50mm [x] Pistoning / []  Threading  []  Winding/Coning  []    Site 3 IT band (right side) 0.25 x 30mm [x] Pistoning / []  Threading  []  Winding/Coning  []    Site 4                    [] Pistoning / []  Threading  []  Winding/Coning  []    Site 5                    [] Pistoning / []  Threading  []  Winding/Coning  []    Site 6                    [] Pistoning / []  Threading  []  Winding/Coning  []    Site 7                    [] Pistoning / []  Threading  []  Winding/Coning  []    Site 8                    [] Pistoning / []  Threading  []  Winding/Coning  []    Site 9                    [] Pistoning / []  Threading  []  Winding/Coning  []    Site 10                    [] Pistoning / []  Threading  []  Winding/Coning  []      **The above techniques were used to restore functional range of motion, reduce muscle spasm, and induce healing in the corresponding musculature by means of intramuscular mobilization. Clean Technique was utilized today while applying the Dry needling treatment. The treatment sites where cleaned with 70% solution of isopropyl alcohol.  The PT washed their hands and utilized treatment gloves along with hand  prior to inserting the needles.  All needles where removed and discarded in the appropriate

## 2020-01-23 ENCOUNTER — HOSPITAL ENCOUNTER (OUTPATIENT)
Dept: PHYSICAL THERAPY | Age: 30
Setting detail: THERAPIES SERIES
Discharge: HOME | End: 2020-01-23
Payer: COMMERCIAL

## 2020-01-23 PROCEDURE — 97110 THERAPEUTIC EXERCISES: CPT | Performed by: PHYSICAL THERAPIST

## 2020-01-23 PROCEDURE — 97140 MANUAL THERAPY 1/> REGIONS: CPT | Performed by: PHYSICAL THERAPIST

## 2020-01-23 NOTE — FLOWSHEET NOTE
ball bridge  Swiss ball bridge w/ HSC x20  x20  x20                   Dry needling 10 min                                            Manual Intervention (83310)     Manual Stretching 15'                                                 Patient Education 5' HEP ed with patient       Therapeutic Exercise and NMR EXR  [x] (99420) Provided verbal/tactile cueing for activities related to strengthening, flexibility, endurance, ROM for improvements in LE, proximal hip, and core control with self care, mobility, lifting, ambulation. [x] (88694) Provided verbal/tactile cueing for activities related to improving balance, coordination, kinesthetic sense, posture, motor skill, proprioception to assist with LE, proximal hip, and core control in self-care, mobility, lifting, ambulation and eccentric single leg control. NMR and Therapeutic Activities:    [x] (66380 or 96606) Provided verbal/tactile cueing for activities related to improving balance, coordination, kinesthetic sense, posture, motor skill, proprioception and motor activation to allow for proper function of core, proximal hip and LE with self-care and ADLs and functional mobility.    [x] (22591) Gait Re-education- Provided training and instruction to the patient for proper LE, core and proximal hip recruitment and positioning and eccentric body weight control with ambulation re-education including up and down stairs     Home Exercise Program:    [x] (81777) Reviewed/Progressed HEP activities related to strengthening, flexibility, endurance, ROM of core, proximal hip and LE for functional self-care, mobility, lifting and ambulation/stair navigation   [x] (71645) Reviewed/Progressed HEP activities related to improving balance, coordination, kinesthetic sense, posture, motor skill, proprioception of core, proximal hip and LE for self-care, mobility, lifting, and ambulation/stair navigation      Manual Treatments:  PROM / STM / Oscillations-Mobs:  G-I, II, III, IV (PA's, Inf., Post.)  [x] (86336) Provided manual therapy to mobilize LE, proximal hip and/or LS spine soft tissue/joints for the purpose of modulating pain, promoting relaxation, increasing ROM, reducing/eliminating soft tissue swelling/inflammation/restriction, improving soft tissue extensibility and allowing for proper ROM for normal function with self-care, mobility, lifting and ambulation. Consent signed 12/3/19 and precautions addressed. See media tab. Muscle  Needle Size Technique Notes IES   Site 1 Glute Med (bilateral) 0.30 x 50mm [x] Pistoning / []  Threading  []  Winding/Coning Contractions ? []    Site 2 Piriformis (left side) 0.30 x 50mm [x] Pistoning / []  Threading  []  Winding/Coning  []    Site 3 IT band (right side) 0.25 x 30mm [x] Pistoning / []  Threading  []  Winding/Coning  []    Site 4                    [] Pistoning / []  Threading  []  Winding/Coning  []    Site 5                    [] Pistoning / []  Threading  []  Winding/Coning  []    Site 6                    [] Pistoning / []  Threading  []  Winding/Coning  []    Site 7                    [] Pistoning / []  Threading  []  Winding/Coning  []    Site 8                    [] Pistoning / []  Threading  []  Winding/Coning  []    Site 9                    [] Pistoning / []  Threading  []  Winding/Coning  []    Site 10                    [] Pistoning / []  Threading  []  Winding/Coning  []      **The above techniques were used to restore functional range of motion, reduce muscle spasm, and induce healing in the corresponding musculature by means of intramuscular mobilization. Clean Technique was utilized today while applying the Dry needling treatment. The treatment sites where cleaned with 70% solution of isopropyl alcohol. The PT washed their hands and utilized treatment gloves along with hand  prior to inserting the needles.  All needles where removed and discarded in the appropriate sharps container.   MD has given verbal and/or written approval for this treatment. **    ** Educated patient on anatomy, trigger point etiology, expectations for TDN (bruising, soreness, etc), outcomes, and recommendations for exercise. **    Modalities:      Charges:  Timed Code Treatment Minutes: 55   Total Treatment Minutes:  55   BWC:  TE TIME:  NMR TIME:  MANUAL TIME:  UNTIMED MINUTES:   -  -  -  -      [] EVAL (LOW) 89820 (typically 20 minutes face-to-face)  [] EVAL (MOD) 26849 (typically 30 minutes face-to-face)  [] EVAL (HIGH) 52819 (typically 45 minutes face-to-face)  [] RE-EVAL     [x] CA(90592) x   2  [] IONTO  [] NMR (81321) x     [] VASO  [x] Manual (60884) x 2  [] Other:  [] TA x      [] Mech Traction (71728)  [] ES(attended) (20219)      [] ES (un) (57049):    ASSESSMENT:  See eval    GOALS:   Short Term Goals: To be achieved in: 2 weeks  1. Independent in HEP and progression per patient tolerance, in order to prevent re-injury. [] Progressing: [x] Met: [] Not Met: [] Adjusted   2. Patient will have a decrease in pain to facilitate improvement in movement, function, and ADLs as indicated by Functional Deficits. [] Progressing: [x] Met: [] Not Met: [] Adjusted     Long Term Goals: To be achieved in: 12 weeks  1. Disability index score of 20% or less for the LEFS to assist with reaching prior level of function. [] Progressing: [] Met: [] Not Met: [] Adjusted   2. Patient will demonstrate increased AROM to equal the opposite side bilaterally to allow for proper joint functioning as indicated by patients Functional Deficits. [] Progressing: [x] Met: [] Not Met: [] Adjusted   3. Patient will demonstrate an increase in strength of B LE to 4+/5 to allow for proper functional mobility as indicated by patients Functional Deficits. [x] Progressing: [] Met: [] Not Met: [] Adjusted   4. Patient will return to all transfers, work activities, and functional activities without increased symptoms or restriction.    [x] Progressing: [] Met: [] Not Met: [] Adjusted   5. Patient will have 0/10 pain with ADL's. [x] Progressing: [] Met: [] Not Met: [] Adjusted   6. Patient stated goal: to decrease pain to be more active  [] Progressing: [x] Met: [] Not Met: [] Adjusted     Overall Progression Towards Functional goals/ Treatment Progress Update:  [x] Patient is progressing as expected towards functional goals listed. [] Progression is slowed due to complexities/Impairments listed. [] Progression has been slowed due to co-morbidities. [] Plan just implemented, too soon to assess goals progression <30days   [] Goals require adjustment due to lack of progress  [] Patient is not progressing as expected and requires additional follow up with physician  [] Other    Prognosis for POC: [x] Good [] Fair  [] Poor      Patient requires continued skilled intervention: [x] Yes  [] No    Treatment/Activity Tolerance:  [x] Patient able to complete treatment  [] Patient limited by fatigue  [] Patient limited by pain    [] Patient limited by other medical complications  [] Other:     Return to Play: (if applicable)   []  Stage 1: Intro to Strength   []  Stage 2: Return to Run and Strength   []  Stage 3: Return to Jump and Strength   []  Stage 4: Dynamic Strength and Agility   []  Stage 5: Sport Specific Training     []  Ready to Return to Play, Meets All Above Stages   []  Not Ready for Return to Sports   Comments:                         PLAN:   [x] Continue per plan of care [] Alter current plan (see comments above)  [] Plan of care initiated [] Hold pending MD visit [] Discharge    Electronically signed by:  Balbir Reaves PT, PTA / Balbir Reaves PT, DPT, OMT-CAkbar  [phone]        Note: If patient does not return for scheduled/ recommended follow up visits, this note will serve as a discharge from care along with most recent update on progress.

## 2020-01-28 ENCOUNTER — HOSPITAL ENCOUNTER (OUTPATIENT)
Dept: PHYSICAL THERAPY | Age: 30
Setting detail: THERAPIES SERIES
Discharge: HOME | End: 2020-01-28
Payer: COMMERCIAL

## 2020-01-30 ENCOUNTER — HOSPITAL ENCOUNTER (OUTPATIENT)
Dept: PHYSICAL THERAPY | Age: 30
Setting detail: THERAPIES SERIES
Discharge: HOME | End: 2020-01-30
Payer: COMMERCIAL

## 2020-01-30 NOTE — FLOWSHEET NOTE
[place] [address], [facility])    Physical Therapy  Cancellation/No-show Note  Patient Name:  Magnolia Hooker  :  1990   Date:  2020    Cancelled visits to date: 5   No-shows to date: 0    For today's appointment patient:  [x]  Cancelled  []  Rescheduled appointment  []  No-show     Reason given by patient:  [x]  Patient kids ill  []  Conflicting appointment  []  No transportation    []  Conflict with work  []  No reason given  []  Other:     Comments:      Phone call information:   []  Phone call made today to patient. []  Patient answered, conversation as follows:    []  Patient did not answer. []  Phone call not made today  [x]  Phone call not needed - pt contacted us to cancel and provided reason for cancellation.      Electronically signed by:  Elmer Crenshaw PT

## 2020-02-06 ENCOUNTER — HOSPITAL ENCOUNTER (OUTPATIENT)
Dept: PHYSICAL THERAPY | Age: 30
Setting detail: THERAPIES SERIES
Discharge: HOME | End: 2020-02-06
Payer: COMMERCIAL

## 2020-02-06 ENCOUNTER — TELEPHONE (OUTPATIENT)
Dept: FAMILY MEDICINE CLINIC | Age: 30
End: 2020-02-06

## 2020-02-06 PROCEDURE — 97110 THERAPEUTIC EXERCISES: CPT | Performed by: PHYSICAL THERAPIST

## 2020-02-06 PROCEDURE — 97140 MANUAL THERAPY 1/> REGIONS: CPT | Performed by: PHYSICAL THERAPIST

## 2020-02-06 PROCEDURE — 97112 NEUROMUSCULAR REEDUCATION: CPT | Performed by: PHYSICAL THERAPIST

## 2020-02-06 RX ORDER — ONDANSETRON 4 MG/1
4 TABLET, FILM COATED ORAL EVERY 8 HOURS PRN
Qty: 20 TABLET | Refills: 0 | Status: SHIPPED | OUTPATIENT
Start: 2020-02-06 | End: 2020-05-27 | Stop reason: SDUPTHER

## 2020-02-06 NOTE — FLOWSHEET NOTE
x 30\" ea    Supine HS Stretch 3 x 30\"         [place] Pal  3x30\" W/ 5# weight   SLR w/ QS focus x20         Swiss ball HSC  Swiss ball bridge  Swiss ball bridge w/ HSC x20  x20  x20                                                                Manual Intervention (65548)     Manual Stretching 15' Supine and prone                                                Patient Education 5' HEP ed with patient       Therapeutic Exercise and NMR EXR  [x] (19192) Provided verbal/tactile cueing for activities related to strengthening, flexibility, endurance, ROM for improvements in LE, proximal hip, and core control with self care, mobility, lifting, ambulation. [x] (30254) Provided verbal/tactile cueing for activities related to improving balance, coordination, kinesthetic sense, posture, motor skill, proprioception to assist with LE, proximal hip, and core control in self-care, mobility, lifting, ambulation and eccentric single leg control. NMR and Therapeutic Activities:    [x] (31198 or 17280) Provided verbal/tactile cueing for activities related to improving balance, coordination, kinesthetic sense, posture, motor skill, proprioception and motor activation to allow for proper function of core, proximal hip and LE with self-care and ADLs and functional mobility.    [x] (35146) Gait Re-education- Provided training and instruction to the patient for proper LE, core and proximal hip recruitment and positioning and eccentric body weight control with ambulation re-education including up and down stairs     Home Exercise Program:    [x] (66788) Reviewed/Progressed HEP activities related to strengthening, flexibility, endurance, ROM of core, proximal hip and LE for functional self-care, mobility, lifting and ambulation/stair navigation   [x] (33550) Reviewed/Progressed HEP activities related to improving balance, coordination, kinesthetic sense, posture, motor skill, proprioception of core, proximal hip and LE for self-care, mobility, lifting, and ambulation/stair navigation      Manual Treatments:  PROM / STM / Oscillations-Mobs:  G-I, II, III, IV (PA's, Inf., Post.)  [x] (40799) Provided manual therapy to mobilize LE, proximal hip and/or LS spine soft tissue/joints for the purpose of modulating pain, promoting relaxation, increasing ROM, reducing/eliminating soft tissue swelling/inflammation/restriction, improving soft tissue extensibility and allowing for proper ROM for normal function with self-care, mobility, lifting and ambulation. **The above techniques were used to restore functional range of motion, reduce muscle spasm, and induce healing in the corresponding musculature by means of intramuscular mobilization. Clean Technique was utilized today while applying the Dry needling treatment. The treatment sites where cleaned with 70% solution of isopropyl alcohol. The PT washed their hands and utilized treatment gloves along with hand  prior to inserting the needles.  All needles where removed and discarded in the appropriate sharps container. MD has given verbal and/or written approval for this treatment. **    ** Educated patient on anatomy, trigger point etiology, expectations for TDN (bruising, soreness, etc), outcomes, and recommendations for exercise. **    Modalities:      Charges:  Timed Code Treatment Minutes: 55   Total Treatment Minutes:  55   BWC:  TE TIME:  NMR TIME:  MANUAL TIME:  UNTIMED MINUTES:   -  -  -  -      [] EVAL (LOW) 10794 (typically 20 minutes face-to-face)  [] EVAL (MOD) 70901 (typically 30 minutes face-to-face)  [] EVAL (HIGH) 95376 (typically 45 minutes face-to-face)  [] RE-EVAL     [x] TF(16322) x   2  [] IONTO  [x] NMR (37859) x 1    [] VASO  [x] Manual (80411) x 1  [] Other:  [] TA x      [] Mech Traction (59364)  [] ES(attended) (59000)      [] ES (un) (31548):    ASSESSMENT:  See eval    GOALS:   Short Term Goals: To be achieved in: 2 weeks  1.  Independent in HEP and progression per patient tolerance, in order to prevent re-injury. [] Progressing: [x] Met: [] Not Met: [] Adjusted   2. Patient will have a decrease in pain to facilitate improvement in movement, function, and ADLs as indicated by Functional Deficits. [] Progressing: [x] Met: [] Not Met: [] Adjusted     Long Term Goals: To be achieved in: 12 weeks  1. Disability index score of 20% or less for the LEFS to assist with reaching prior level of function. [] Progressing: [] Met: [] Not Met: [] Adjusted   2. Patient will demonstrate increased AROM to equal the opposite side bilaterally to allow for proper joint functioning as indicated by patients Functional Deficits. [] Progressing: [x] Met: [] Not Met: [] Adjusted   3. Patient will demonstrate an increase in strength of B LE to 4+/5 to allow for proper functional mobility as indicated by patients Functional Deficits. [x] Progressing: [] Met: [] Not Met: [] Adjusted   4. Patient will return to all transfers, work activities, and functional activities without increased symptoms or restriction. [x] Progressing: [] Met: [] Not Met: [] Adjusted   5. Patient will have 0/10 pain with ADL's. [x] Progressing: [] Met: [] Not Met: [] Adjusted   6. Patient stated goal: to decrease pain to be more active  [] Progressing: [x] Met: [] Not Met: [] Adjusted     Overall Progression Towards Functional goals/ Treatment Progress Update:  [x] Patient is progressing as expected towards functional goals listed. [] Progression is slowed due to complexities/Impairments listed. [] Progression has been slowed due to co-morbidities.   [] Plan just implemented, too soon to assess goals progression <30days   [] Goals require adjustment due to lack of progress  [] Patient is not progressing as expected and requires additional follow up with physician  [] Other    Prognosis for POC: [x] Good [] Fair  [] Poor      Patient requires continued skilled intervention: [x] Yes  [] No    Treatment/Activity

## 2020-02-13 ENCOUNTER — HOSPITAL ENCOUNTER (OUTPATIENT)
Dept: PHYSICAL THERAPY | Age: 30
Setting detail: THERAPIES SERIES
Discharge: HOME | End: 2020-02-13
Payer: COMMERCIAL

## 2020-02-19 ENCOUNTER — OFFICE VISIT (OUTPATIENT)
Dept: FAMILY MEDICINE CLINIC | Age: 30
End: 2020-02-19
Payer: COMMERCIAL

## 2020-02-19 VITALS
BODY MASS INDEX: 23.91 KG/M2 | WEIGHT: 135 LBS | HEART RATE: 81 BPM | DIASTOLIC BLOOD PRESSURE: 62 MMHG | OXYGEN SATURATION: 99 % | SYSTOLIC BLOOD PRESSURE: 98 MMHG

## 2020-02-19 LAB
BILIRUBIN, POC: NORMAL
BLOOD URINE, POC: NORMAL
CLARITY, POC: CLEAR
COLOR, POC: YELLOW
CONTROL: PRESENT
GLUCOSE URINE, POC: NORMAL
KETONES, POC: NORMAL
LEUKOCYTE EST, POC: NORMAL
NITRITE, POC: NORMAL
PH, POC: 7.5
PREGNANCY TEST URINE, POC: NORMAL
PROTEIN, POC: NORMAL
SPECIFIC GRAVITY, POC: 1.01
UROBILINOGEN, POC: 0.2

## 2020-02-19 PROCEDURE — 1036F TOBACCO NON-USER: CPT | Performed by: NURSE PRACTITIONER

## 2020-02-19 PROCEDURE — G8427 DOCREV CUR MEDS BY ELIG CLIN: HCPCS | Performed by: NURSE PRACTITIONER

## 2020-02-19 PROCEDURE — G8484 FLU IMMUNIZE NO ADMIN: HCPCS | Performed by: NURSE PRACTITIONER

## 2020-02-19 PROCEDURE — 99212 OFFICE O/P EST SF 10 MIN: CPT | Performed by: NURSE PRACTITIONER

## 2020-02-19 PROCEDURE — G8420 CALC BMI NORM PARAMETERS: HCPCS | Performed by: NURSE PRACTITIONER

## 2020-02-19 PROCEDURE — 81025 URINE PREGNANCY TEST: CPT | Performed by: NURSE PRACTITIONER

## 2020-02-19 PROCEDURE — 81002 URINALYSIS NONAUTO W/O SCOPE: CPT | Performed by: NURSE PRACTITIONER

## 2020-02-19 RX ORDER — NITROFURANTOIN MACROCRYSTALS 100 MG/1
CAPSULE ORAL
COMMUNITY
Start: 2020-02-10 | End: 2020-03-18 | Stop reason: ALTCHOICE

## 2020-02-19 ASSESSMENT — ENCOUNTER SYMPTOMS
COUGH: 0
EYE PAIN: 0
CHOKING: 0
SHORTNESS OF BREATH: 0
EYE DISCHARGE: 0
SINUS PRESSURE: 0
BLOOD IN STOOL: 0
BACK PAIN: 0
RHINORRHEA: 0
VOICE CHANGE: 0
EYE REDNESS: 0
ABDOMINAL PAIN: 1
WHEEZING: 0
EYE ITCHING: 0
CHEST TIGHTNESS: 0
TROUBLE SWALLOWING: 0
STRIDOR: 0
COLOR CHANGE: 0
SORE THROAT: 0
PHOTOPHOBIA: 0
VOMITING: 0
NAUSEA: 0
DIARRHEA: 0
SINUS PAIN: 0
CONSTIPATION: 0

## 2020-02-19 NOTE — PROGRESS NOTES
Chief Complaint   Patient presents with    Amenorrhea     missed period        BP 98/62   Pulse 81   Wt 135 lb (61.2 kg)   SpO2 99%   Breastfeeding No   BMI 23.91 kg/m²     HPI:  Ashwin Santillan is a 34 y.o. (: 1990) here today   for   Last menstrual period is , she was on clomid. They are trying to conceive. She is due to start her period in the next few days. No results found for this visit on 20. Urine preg test in office was negative        She is having bladder aches and is on daily abx therapy for it. She brought in 3 pregnancy tests one positive and two negative. Patient's medications, allergies, past medical, surgical, social and family histories were reviewed and updated asappropriate. ROS:  Review of Systems   Constitutional: Negative for activity change, appetite change, chills, diaphoresis, fatigue, fever and unexpected weight change. HENT: Negative for congestion, ear discharge, ear pain, hearing loss, nosebleeds, postnasal drip, rhinorrhea, sinus pressure, sinus pain, sneezing, sore throat, tinnitus, trouble swallowing and voice change. Eyes: Negative for photophobia, pain, discharge, redness and itching. Respiratory: Negative for cough, choking, chest tightness, shortness of breath, wheezing and stridor. Cardiovascular: Negative for chest pain, palpitations and leg swelling. Gastrointestinal: Positive for abdominal pain. Negative for blood in stool, constipation, diarrhea, nausea and vomiting. Endocrine: Negative for cold intolerance, heat intolerance, polydipsia and polyuria. Genitourinary: Negative for difficulty urinating, dysuria, enuresis, flank pain, frequency, hematuria and urgency. Musculoskeletal: Negative for back pain, gait problem, joint swelling, neck pain and neck stiffness. Skin: Negative for color change, pallor, rash and wound. Allergic/Immunologic: Negative for environmental allergies and food allergies.

## 2020-02-20 ENCOUNTER — HOSPITAL ENCOUNTER (OUTPATIENT)
Dept: PHYSICAL THERAPY | Age: 30
Setting detail: THERAPIES SERIES
Discharge: HOME | End: 2020-02-20
Payer: COMMERCIAL

## 2020-02-20 PROCEDURE — 97112 NEUROMUSCULAR REEDUCATION: CPT | Performed by: PHYSICAL THERAPIST

## 2020-02-20 PROCEDURE — 97140 MANUAL THERAPY 1/> REGIONS: CPT | Performed by: PHYSICAL THERAPIST

## 2020-02-20 PROCEDURE — 97110 THERAPEUTIC EXERCISES: CPT | Performed by: PHYSICAL THERAPIST

## 2020-02-20 NOTE — FLOWSHEET NOTE
[facility], [address] [place] [address] [place], [address]         Physical Therapy Treatment Note/ Progress Report:     Date:  2020    Patient Name:  Cristian Arroyo    :  1990  MRN: [id number]  Restrictions/Precautions:    Medical/Treatment Diagnosis Information:  · Diagnosis: Left Piriformis syndrome; Right IT Band Syndrome  · Treatment Diagnosis: G57.02, M23.96  Insurance/Certification information:  PT [facility] Information[facility]  Physician Information:  Referring Practitioner: Marlys Campos  Has the plan of care been signed (Y/N):        []  Yes  [x]  No     Date of Patient follow up with Physician: no follow up yet     Is this a Progress Report:     []  Yes  [x]  No       If Yes:  Date Range for reporting period:  Initial Eval: 19    Beginnin2020 (ending 2020)  Beginnin20 ----Ending: 3/20/20      Progress report will be due (10 Rx or 30 days whichever is less):     Recertification will be due (POC Duration  / 90 days whichever is less): 3/20/20    Visit # Insurance Allowable Auth Required   8 ()    4 () 30    Med nec []  Yes []  No      Functional Scale: LEFS: 36%  / Mod Oswestry: 22% Date assessed:  19    Functional Scale: LEFS: 60/80 25%   Date assessed: 2020    Functional Scale: LEFS: 66/80 18%   Date assessed: 2020     Latex Allergy:  [x]NO      []YES  Preferred Language for Healthcare:   [x]English       []other:    Pain level:   0-1/10 left hip;  3-4/10 right knee    SUBJECTIVE:  The knee has still been giving her some issues (as she points to her IT band and lateral knee). I educated her on quad tone and improving her patella tracking. Her hip has been feeling pretty good. She reports she doesn't believe she would be able to work without these PT sessions.      OBJECTIVE: See eval   Observation:    Test measurements:      RESTRICTIONS/PRECAUTIONS: n/a    Exercises/Interventions:   Therapeutic Ex IONTO  [x] NMR (07158) x 1    [] VASO  [x] Manual (29619) x 1  [] Other:  [] TA x      [] Mech Traction (84125)  [] ES(attended) (00382)      [] ES (un) (80760):    ASSESSMENT:  See eval    GOALS:   Short Term Goals: To be achieved in: 2 weeks  1. Independent in HEP and progression per patient tolerance, in order to prevent re-injury. [] Progressing: [x] Met: [] Not Met: [] Adjusted   2. Patient will have a decrease in pain to facilitate improvement in movement, function, and ADLs as indicated by Functional Deficits. [] Progressing: [x] Met: [] Not Met: [] Adjusted     Long Term Goals: To be achieved in: 12 weeks  1. Disability index score of 20% or less for the LEFS to assist with reaching prior level of function. [] Progressing: [] Met: [] Not Met: [] Adjusted   2. Patient will demonstrate increased AROM to equal the opposite side bilaterally to allow for proper joint functioning as indicated by patients Functional Deficits. [] Progressing: [x] Met: [] Not Met: [] Adjusted   3. Patient will demonstrate an increase in strength of B LE to 4+/5 to allow for proper functional mobility as indicated by patients Functional Deficits. [x] Progressing: [] Met: [] Not Met: [] Adjusted   4. Patient will return to all transfers, work activities, and functional activities without increased symptoms or restriction. [x] Progressing: [] Met: [] Not Met: [] Adjusted   5. Patient will have 0/10 pain with ADL's. [x] Progressing: [] Met: [] Not Met: [] Adjusted   6. Patient stated goal: to decrease pain to be more active  [] Progressing: [x] Met: [] Not Met: [] Adjusted     Overall Progression Towards Functional goals/ Treatment Progress Update:  [x] Patient is progressing as expected towards functional goals listed. [] Progression is slowed due to complexities/Impairments listed. [] Progression has been slowed due to co-morbidities.   [] Plan just implemented, too soon to assess goals progression <30days   [] Goals require adjustment due to lack of progress  [] Patient is not progressing as expected and requires additional follow up with physician  [] Other    Prognosis for POC: [x] Good [] Fair  [] Poor      Patient requires continued skilled intervention: [x] Yes  [] No    Treatment/Activity Tolerance:  [x] Patient able to complete treatment  [] Patient limited by fatigue  [] Patient limited by pain    [] Patient limited by other medical complications  [] Other:     Return to Play: (if applicable)   []  Stage 1: Intro to Strength   []  Stage 2: Return to Run and Strength   []  Stage 3: Return to Jump and Strength   []  Stage 4: Dynamic Strength and Agility   []  Stage 5: Sport Specific Training     []  Ready to Return to Play, Meets All Above Stages   []  Not Ready for Return to Sports   Comments:                         PLAN:   [x] Continue per plan of care [] Alter current plan (see comments above)  [] Plan of care initiated [] Hold pending MD visit [] Discharge    Electronically signed by:  Bautista Quintero PT       Note: If patient does not return for scheduled/ recommended follow up visits, this note will serve as a discharge from care along with most recent update on progress.

## 2020-02-25 ENCOUNTER — HOSPITAL ENCOUNTER (OUTPATIENT)
Dept: PHYSICAL THERAPY | Age: 30
Setting detail: THERAPIES SERIES
Discharge: HOME | End: 2020-02-25
Payer: COMMERCIAL

## 2020-02-25 NOTE — FLOWSHEET NOTE
[address], [facility] ([facility])    Physical Therapy  Cancellation/No-show Note  Patient Name:  Linus Sterling  :  1990   Date:  2020    Cancelled visits to date: 5  No-shows to date: 0    For today's appointment patient:  [x]  Cancelled  []  Rescheduled appointment  []  No-show     Reason given by patient:  []  Patient kids ill  [x]  Conflicting appointment  []  No transportation    []  Conflict with work  []  No reason given  []  Other:     Comments:      Phone call information:   []  Phone call made today to patient. []  Patient answered, conversation as follows:    []  Patient did not answer. []  Phone call not made today  [x]  Phone call not needed - pt contacted us to cancel and provided reason for cancellation.      Electronically signed by:  Terrance Munroe PT

## 2020-03-04 ENCOUNTER — OFFICE VISIT (OUTPATIENT)
Dept: FAMILY MEDICINE CLINIC | Age: 30
End: 2020-03-04
Payer: COMMERCIAL

## 2020-03-04 VITALS
HEART RATE: 74 BPM | BODY MASS INDEX: 23.56 KG/M2 | WEIGHT: 133 LBS | SYSTOLIC BLOOD PRESSURE: 90 MMHG | DIASTOLIC BLOOD PRESSURE: 54 MMHG | OXYGEN SATURATION: 99 %

## 2020-03-04 LAB
CONTROL: PRESENT
GONADOTROPIN, CHORIONIC (HCG) QUANT: <5 MIU/ML
PREGNANCY TEST URINE, POC: NORMAL
T4 FREE: 1.2 NG/DL (ref 0.9–1.8)
TSH SERPL DL<=0.05 MIU/L-ACNC: 1.04 UIU/ML (ref 0.27–4.2)
VITAMIN D 25-HYDROXY: 25.3 NG/ML

## 2020-03-04 PROCEDURE — G8420 CALC BMI NORM PARAMETERS: HCPCS | Performed by: NURSE PRACTITIONER

## 2020-03-04 PROCEDURE — 36415 COLL VENOUS BLD VENIPUNCTURE: CPT | Performed by: NURSE PRACTITIONER

## 2020-03-04 PROCEDURE — 99213 OFFICE O/P EST LOW 20 MIN: CPT | Performed by: NURSE PRACTITIONER

## 2020-03-04 PROCEDURE — 1036F TOBACCO NON-USER: CPT | Performed by: NURSE PRACTITIONER

## 2020-03-04 PROCEDURE — G8484 FLU IMMUNIZE NO ADMIN: HCPCS | Performed by: NURSE PRACTITIONER

## 2020-03-04 PROCEDURE — 81025 URINE PREGNANCY TEST: CPT | Performed by: NURSE PRACTITIONER

## 2020-03-04 PROCEDURE — G8427 DOCREV CUR MEDS BY ELIG CLIN: HCPCS | Performed by: NURSE PRACTITIONER

## 2020-03-04 ASSESSMENT — ENCOUNTER SYMPTOMS
STRIDOR: 0
NAUSEA: 0
EYE DISCHARGE: 0
DIARRHEA: 0
PHOTOPHOBIA: 0
EYE PAIN: 0
CHEST TIGHTNESS: 0
BACK PAIN: 0
RHINORRHEA: 0
VOMITING: 0
SORE THROAT: 0
TROUBLE SWALLOWING: 0
COUGH: 0
EYE REDNESS: 0
CONSTIPATION: 0
EYE ITCHING: 0
COLOR CHANGE: 0
CHOKING: 0
WHEEZING: 0
ABDOMINAL PAIN: 0
SHORTNESS OF BREATH: 0
VOICE CHANGE: 0
SINUS PAIN: 0
SINUS PRESSURE: 0
BLOOD IN STOOL: 0

## 2020-03-04 NOTE — PROGRESS NOTES
Chief Complaint   Patient presents with    Amenorrhea       BP (!) 90/54   Pulse 74   Wt 133 lb (60.3 kg)   LMP 2020   SpO2 99%   Breastfeeding No   BMI 23.56 kg/m²     HPI:  Pushpa Hood is a 34 y.o. (: 1990) here today   for   She has not started her period from the last time I saw her. She has had some positive and negative at home pregnancy tests and in the past her HCG was low when she was pregnant with her prior child. Will check a urine and HCG blood level today in office. Her last period was in January. Patient's medications, allergies, past medical, surgical, social and family histories were reviewed and updated asappropriate. ROS:  Review of Systems   Constitutional: Negative for activity change, appetite change, chills, diaphoresis, fatigue, fever and unexpected weight change. HENT: Negative for congestion, ear discharge, ear pain, hearing loss, nosebleeds, postnasal drip, rhinorrhea, sinus pressure, sinus pain, sneezing, sore throat, tinnitus, trouble swallowing and voice change. Eyes: Negative for photophobia, pain, discharge, redness and itching. Respiratory: Negative for cough, choking, chest tightness, shortness of breath, wheezing and stridor. Cardiovascular: Negative for chest pain, palpitations and leg swelling. Gastrointestinal: Negative for abdominal pain, blood in stool, constipation, diarrhea, nausea and vomiting. Endocrine: Negative for cold intolerance, heat intolerance, polydipsia and polyuria. Genitourinary: Negative for difficulty urinating, dysuria, enuresis, flank pain, frequency, hematuria and urgency. Musculoskeletal: Negative for back pain, gait problem, joint swelling, neck pain and neck stiffness. Skin: Negative for color change, pallor, rash and wound. Allergic/Immunologic: Negative for environmental allergies and food allergies.    Neurological: Negative for dizziness, tremors, syncope, speech difficulty, HYDROXY; Future    3. Fatigue, unspecified type    - TSH without Reflex  - T4, Free    Follow up if symptoms do not improve or worsen. If the patient becomes short of breath go straight to the ER or call 911. Will follow up with lab results if positive will refer to OBGYN.

## 2020-03-10 ENCOUNTER — HOSPITAL ENCOUNTER (OUTPATIENT)
Dept: PHYSICAL THERAPY | Age: 30
Setting detail: THERAPIES SERIES
Discharge: HOME | End: 2020-03-10
Payer: COMMERCIAL

## 2020-03-10 NOTE — FLOWSHEET NOTE
[facility]    Physical Therapy  Cancellation/No-show Note  Patient Name:  Caryl Jackson  :  1990   Date:  3/10/2020    Cancelled visits to date: 10  No-shows to date: 0    For today's appointment patient:  [x]  Cancelled  []  Rescheduled appointment  []  No-show     Reason given by patient:  [x]  Patient kids ill  []  Conflicting appointment  []  No transportation    []  Conflict with work  []  No reason given  []  Other:     Comments:      Phone call information:   []  Phone call made today to patient. []  Patient answered, conversation as follows:    []  Patient did not answer. []  Phone call not made today  [x]  Phone call not needed - pt contacted us to cancel and provided reason for cancellation.      Electronically signed by:  Mariaelena Mcgee PT

## 2020-03-18 ENCOUNTER — TELEPHONE (OUTPATIENT)
Dept: FAMILY MEDICINE CLINIC | Age: 30
End: 2020-03-18

## 2020-03-18 LAB
AMORPHOUS: NEGATIVE
BACTERIA: ABNORMAL
BILIRUBIN URINE: NEGATIVE
CASTS: NEGATIVE
CLARITY: ABNORMAL
COLOR: YELLOW
CRYSTALS, UA: NEGATIVE
EPITHELIAL CELLS: ABNORMAL
ERYTHROCYTES URINE: >50
ERYTHROCYTES URINE: ABNORMAL
GLUCOSE URINE: NEGATIVE
HCG URINE: NEGATIVE
INTERNAL QC: NORMAL
KETONES, URINE: NEGATIVE
LEUKOCYTE ESTERASE, URINE: ABNORMAL
LEUKOCYTES, UA: ABNORMAL
MUCUS, URINE: ABNORMAL
NITRITE SER/PLAS SCNC PT QN: NEGATIVE
PH UA: 6.5
PROTEIN FLUID: >=300 MG/DL
SPECIFIC GRAVITY UA: 1.02 (ref 1–1.03)
TRICHOMONAS: NEGATIVE
UROBILINOGEN, URINE: 0.2 MG/DL (ref 0.2–1)
YEAST, URINE: NEGATIVE

## 2020-03-18 RX ORDER — CIPROFLOXACIN 250 MG/1
250 TABLET, FILM COATED ORAL 2 TIMES DAILY
Qty: 6 TABLET | Refills: 0 | Status: SHIPPED | OUTPATIENT
Start: 2020-03-18 | End: 2020-05-27 | Stop reason: SDUPTHER

## 2020-05-09 LAB
A/G RATIO: 1.1 (ref 1–2.5)
ABO GROUPING: NORMAL
ALBUMIN SERPL-MCNC: 4 G/DL (ref 3.6–5)
ALP BLD-CCNC: 51 U/L (ref 46–116)
ALT SERPL-CCNC: 27 U/L (ref 12–78)
ANION GAP SERPL CALCULATED.3IONS-SCNC: 10.2 MMOL/L (ref 8–16)
ANTIBODY SCREEN: NEGATIVE
AST SERPL-CCNC: 22 U/L (ref 12–36)
BILIRUB SERPL-MCNC: 0.2 MG/DL (ref 0–1.1)
BUN BLDV-MCNC: 12 MG/DL (ref 5–19)
CALCIUM SERPL-MCNC: 8.8 MG/DL (ref 8.4–10.2)
CHLORIDE BLD-SCNC: 104 MMOL/L (ref 99–111)
CO2: 28 MMOL/L (ref 21–33)
CREAT SERPL-MCNC: 0.8 MG/DL (ref 0.6–1.3)
GFR CALCULATED: 86
GLOBULIN: 3.7 G/DL (ref 2–3.5)
GLUCOSE BLD-MCNC: 99 MG/DL (ref 74–99)
POTASSIUM SERPL-SCNC: 4 MMOL/L (ref 3.4–5)
SODIUM BLD-SCNC: 138 MMOL/L (ref 136–146)
TOTAL PROTEIN: 7.7 G/DL (ref 6.3–8)

## 2020-05-10 LAB
ESTRADIOL, SENSITIVE: 23.9 PG/ML
HEPATITIS B SURFACE ANTIGEN: NEGATIVE
HEPATITIS C ANTIBODY: <0.1 S/CO RATIO (ref 0–0.9)
LUTEINIZING HORMONE: 5.1 MIU/ML
PROGESTERONE LEVEL: 0.2 NG/ML

## 2020-05-12 LAB — HIV 1/2 ANTIBODY: NON REACTIVE

## 2020-05-13 LAB — FOLLICLE STIMULATING HORMONE: 5.7 MIU/ML

## 2020-05-27 ENCOUNTER — OFFICE VISIT (OUTPATIENT)
Dept: FAMILY MEDICINE CLINIC | Age: 30
End: 2020-05-27
Payer: COMMERCIAL

## 2020-05-27 VITALS
BODY MASS INDEX: 25.33 KG/M2 | DIASTOLIC BLOOD PRESSURE: 64 MMHG | SYSTOLIC BLOOD PRESSURE: 100 MMHG | WEIGHT: 143 LBS | HEART RATE: 70 BPM | OXYGEN SATURATION: 98 %

## 2020-05-27 LAB
BILIRUBIN, POC: NORMAL
BLOOD URINE, POC: NORMAL
CLARITY, POC: CLEAR
COLOR, POC: YELLOW
GLUCOSE URINE, POC: NORMAL
KETONES, POC: NORMAL
LEUKOCYTE EST, POC: NORMAL
NITRITE, POC: NORMAL
PH, POC: 6
PROTEIN, POC: NORMAL
SPECIFIC GRAVITY, POC: 1.02
UROBILINOGEN, POC: 0.2

## 2020-05-27 PROCEDURE — 99213 OFFICE O/P EST LOW 20 MIN: CPT | Performed by: NURSE PRACTITIONER

## 2020-05-27 PROCEDURE — G8419 CALC BMI OUT NRM PARAM NOF/U: HCPCS | Performed by: NURSE PRACTITIONER

## 2020-05-27 PROCEDURE — G8427 DOCREV CUR MEDS BY ELIG CLIN: HCPCS | Performed by: NURSE PRACTITIONER

## 2020-05-27 PROCEDURE — 1036F TOBACCO NON-USER: CPT | Performed by: NURSE PRACTITIONER

## 2020-05-27 PROCEDURE — 81003 URINALYSIS AUTO W/O SCOPE: CPT | Performed by: NURSE PRACTITIONER

## 2020-05-27 RX ORDER — ONDANSETRON 4 MG/1
4 TABLET, FILM COATED ORAL EVERY 8 HOURS PRN
Qty: 20 TABLET | Refills: 0 | Status: SHIPPED | OUTPATIENT
Start: 2020-05-27 | End: 2020-11-17 | Stop reason: ALTCHOICE

## 2020-05-27 RX ORDER — ONDANSETRON 4 MG/1
4 TABLET, FILM COATED ORAL EVERY 8 HOURS PRN
Qty: 20 TABLET | Refills: 0 | OUTPATIENT
Start: 2020-05-27

## 2020-05-27 RX ORDER — CIPROFLOXACIN 250 MG/1
250 TABLET, FILM COATED ORAL 2 TIMES DAILY
Qty: 6 TABLET | Refills: 0 | Status: SHIPPED | OUTPATIENT
Start: 2020-05-27 | End: 2020-05-30

## 2020-05-28 LAB — URINE CULTURE, ROUTINE: NORMAL

## 2020-06-18 RX ORDER — PAROXETINE 10 MG/1
10 TABLET, FILM COATED ORAL DAILY
Qty: 30 TABLET | Refills: 5 | Status: SHIPPED | OUTPATIENT
Start: 2020-06-18 | End: 2020-11-17 | Stop reason: ALTCHOICE

## 2020-07-06 ENCOUNTER — APPOINTMENT (OUTPATIENT)
Dept: CT IMAGING | Age: 30
End: 2020-07-06
Payer: COMMERCIAL

## 2020-07-06 ENCOUNTER — NURSE TRIAGE (OUTPATIENT)
Dept: OTHER | Facility: CLINIC | Age: 30
End: 2020-07-06

## 2020-07-06 ENCOUNTER — HOSPITAL ENCOUNTER (EMERGENCY)
Age: 30
Discharge: HOME | End: 2020-07-06
Attending: EMERGENCY MEDICINE
Payer: COMMERCIAL

## 2020-07-06 VITALS
DIASTOLIC BLOOD PRESSURE: 64 MMHG | OXYGEN SATURATION: 100 % | WEIGHT: 145 LBS | HEART RATE: 72 BPM | RESPIRATION RATE: 16 BRPM | HEIGHT: 63 IN | TEMPERATURE: 98.1 F | BODY MASS INDEX: 25.69 KG/M2 | SYSTOLIC BLOOD PRESSURE: 100 MMHG

## 2020-07-06 LAB
A/G RATIO: 1.6 (ref 1.1–2.2)
ALBUMIN SERPL-MCNC: 4.5 G/DL (ref 3.4–5)
ALP BLD-CCNC: 51 U/L (ref 40–129)
ALT SERPL-CCNC: 16 U/L (ref 10–40)
ANION GAP SERPL CALCULATED.3IONS-SCNC: 9 MMOL/L (ref 3–16)
AST SERPL-CCNC: 22 U/L (ref 15–37)
BACTERIA: ABNORMAL /HPF
BASOPHILS ABSOLUTE: 0 K/UL (ref 0–0.2)
BASOPHILS RELATIVE PERCENT: 0.5 %
BILIRUB SERPL-MCNC: <0.2 MG/DL (ref 0–1)
BILIRUBIN URINE: NEGATIVE
BLOOD, URINE: NEGATIVE
BUN BLDV-MCNC: 12 MG/DL (ref 7–20)
CALCIUM SERPL-MCNC: 9 MG/DL (ref 8.3–10.6)
CHLORIDE BLD-SCNC: 104 MMOL/L (ref 99–110)
CLARITY: ABNORMAL
CO2: 25 MMOL/L (ref 21–32)
COLOR: ABNORMAL
CREAT SERPL-MCNC: 0.6 MG/DL (ref 0.6–1.1)
CRYSTALS, UA: ABNORMAL /HPF
EOSINOPHILS ABSOLUTE: 0.1 K/UL (ref 0–0.6)
EOSINOPHILS RELATIVE PERCENT: 2.2 %
EPITHELIAL CELLS, UA: ABNORMAL /HPF (ref 0–5)
GFR AFRICAN AMERICAN: >60
GFR NON-AFRICAN AMERICAN: >60
GLOBULIN: 2.9 G/DL
GLUCOSE BLD-MCNC: 97 MG/DL (ref 70–99)
GLUCOSE URINE: NEGATIVE MG/DL
HCG(URINE) PREGNANCY TEST: NEGATIVE
HCT VFR BLD CALC: 36.5 % (ref 36–48)
HEMOGLOBIN: 12.1 G/DL (ref 12–16)
KETONES, URINE: ABNORMAL MG/DL
LEUKOCYTE ESTERASE, URINE: ABNORMAL
LIPASE: 22 U/L (ref 13–60)
LYMPHOCYTES ABSOLUTE: 1.8 K/UL (ref 1–5.1)
LYMPHOCYTES RELATIVE PERCENT: 42.4 %
MCH RBC QN AUTO: 28.2 PG (ref 26–34)
MCHC RBC AUTO-ENTMCNC: 33.1 G/DL (ref 31–36)
MCV RBC AUTO: 85.1 FL (ref 80–100)
MICROSCOPIC EXAMINATION: YES
MONOCYTES ABSOLUTE: 0.3 K/UL (ref 0–1.3)
MONOCYTES RELATIVE PERCENT: 7.8 %
MUCUS: ABNORMAL /LPF
NEUTROPHILS ABSOLUTE: 2.1 K/UL (ref 1.7–7.7)
NEUTROPHILS RELATIVE PERCENT: 47.1 %
NITRITE, URINE: NEGATIVE
PDW BLD-RTO: 13.2 % (ref 12.4–15.4)
PH UA: 6 (ref 5–8)
PLATELET # BLD: 231 K/UL (ref 135–450)
PMV BLD AUTO: 7.5 FL (ref 5–10.5)
POTASSIUM REFLEX MAGNESIUM: 4.4 MMOL/L (ref 3.5–5.1)
PROTEIN UA: ABNORMAL MG/DL
RBC # BLD: 4.29 M/UL (ref 4–5.2)
RBC UA: ABNORMAL /HPF (ref 0–4)
SODIUM BLD-SCNC: 138 MMOL/L (ref 136–145)
SPECIFIC GRAVITY UA: >=1.03 (ref 1–1.03)
TOTAL PROTEIN: 7.4 G/DL (ref 6.4–8.2)
URINE TYPE: ABNORMAL
UROBILINOGEN, URINE: 0.2 E.U./DL
WBC # BLD: 4.4 K/UL (ref 4–11)
WBC UA: ABNORMAL /HPF (ref 0–5)

## 2020-07-06 PROCEDURE — 96375 TX/PRO/DX INJ NEW DRUG ADDON: CPT

## 2020-07-06 PROCEDURE — 2580000003 HC RX 258: Performed by: EMERGENCY MEDICINE

## 2020-07-06 PROCEDURE — 36415 COLL VENOUS BLD VENIPUNCTURE: CPT

## 2020-07-06 PROCEDURE — 74177 CT ABD & PELVIS W/CONTRAST: CPT

## 2020-07-06 PROCEDURE — 84703 CHORIONIC GONADOTROPIN ASSAY: CPT

## 2020-07-06 PROCEDURE — 83690 ASSAY OF LIPASE: CPT

## 2020-07-06 PROCEDURE — 99284 EMERGENCY DEPT VISIT MOD MDM: CPT

## 2020-07-06 PROCEDURE — 81001 URINALYSIS AUTO W/SCOPE: CPT

## 2020-07-06 PROCEDURE — 6360000002 HC RX W HCPCS: Performed by: EMERGENCY MEDICINE

## 2020-07-06 PROCEDURE — 6360000004 HC RX CONTRAST MEDICATION: Performed by: EMERGENCY MEDICINE

## 2020-07-06 PROCEDURE — 85025 COMPLETE CBC W/AUTO DIFF WBC: CPT

## 2020-07-06 PROCEDURE — 96374 THER/PROPH/DIAG INJ IV PUSH: CPT

## 2020-07-06 PROCEDURE — 80053 COMPREHEN METABOLIC PANEL: CPT

## 2020-07-06 RX ORDER — ONDANSETRON 4 MG/1
4 TABLET, ORALLY DISINTEGRATING ORAL EVERY 8 HOURS PRN
Qty: 20 TABLET | Refills: 0 | Status: SHIPPED | OUTPATIENT
Start: 2020-07-06 | End: 2020-07-13 | Stop reason: ALTCHOICE

## 2020-07-06 RX ORDER — TROSPIUM CHLORIDE 20 MG/1
TABLET, FILM COATED ORAL
COMMUNITY
Start: 2020-04-09 | End: 2020-11-17 | Stop reason: ALTCHOICE

## 2020-07-06 RX ORDER — ONDANSETRON 2 MG/ML
4 INJECTION INTRAMUSCULAR; INTRAVENOUS ONCE
Status: COMPLETED | OUTPATIENT
Start: 2020-07-06 | End: 2020-07-06

## 2020-07-06 RX ORDER — KETOROLAC TROMETHAMINE 30 MG/ML
15 INJECTION, SOLUTION INTRAMUSCULAR; INTRAVENOUS ONCE
Status: COMPLETED | OUTPATIENT
Start: 2020-07-06 | End: 2020-07-06

## 2020-07-06 RX ORDER — 0.9 % SODIUM CHLORIDE 0.9 %
500 INTRAVENOUS SOLUTION INTRAVENOUS ONCE
Status: COMPLETED | OUTPATIENT
Start: 2020-07-06 | End: 2020-07-06

## 2020-07-06 RX ORDER — CEFDINIR 300 MG/1
300 CAPSULE ORAL 2 TIMES DAILY
Qty: 14 CAPSULE | Refills: 0 | Status: SHIPPED | OUTPATIENT
Start: 2020-07-06 | End: 2020-07-13 | Stop reason: ALTCHOICE

## 2020-07-06 RX ADMIN — WATER 1 G: 1 INJECTION INTRAMUSCULAR; INTRAVENOUS; SUBCUTANEOUS at 13:53

## 2020-07-06 RX ADMIN — IOPAMIDOL 75 ML: 755 INJECTION, SOLUTION INTRAVENOUS at 14:16

## 2020-07-06 RX ADMIN — SODIUM CHLORIDE 500 ML: 9 INJECTION, SOLUTION INTRAVENOUS at 13:52

## 2020-07-06 RX ADMIN — ONDANSETRON HYDROCHLORIDE 4 MG: 2 INJECTION, SOLUTION INTRAMUSCULAR; INTRAVENOUS at 13:53

## 2020-07-06 RX ADMIN — KETOROLAC TROMETHAMINE 15 MG: 30 INJECTION, SOLUTION INTRAMUSCULAR at 13:52

## 2020-07-06 ASSESSMENT — PAIN SCALES - GENERAL
PAINLEVEL_OUTOF10: 4
PAINLEVEL_OUTOF10: 6
PAINLEVEL_OUTOF10: 6

## 2020-07-06 ASSESSMENT — PAIN DESCRIPTION - ORIENTATION: ORIENTATION: RIGHT

## 2020-07-06 ASSESSMENT — PAIN DESCRIPTION - LOCATION: LOCATION: ABDOMEN;FLANK

## 2020-07-06 ASSESSMENT — PAIN DESCRIPTION - DESCRIPTORS: DESCRIPTORS: DULL;PINS AND NEEDLES

## 2020-07-06 ASSESSMENT — PAIN DESCRIPTION - PAIN TYPE: TYPE: ACUTE PAIN

## 2020-07-06 ASSESSMENT — PAIN DESCRIPTION - FREQUENCY: FREQUENCY: CONTINUOUS

## 2020-07-06 NOTE — ED NOTES
Reviewed patient discharge instructions at this time, copy given to patient. No questions or concerns. Patient voiced understanding.         Michael Hager RN  07/06/20 8630

## 2020-07-06 NOTE — TELEPHONE ENCOUNTER
Received call from [facility]. Reason for Disposition   MODERATE OR MILD pain that comes and goes (cramps) lasts > 24 hours    Answer Assessment - Initial Assessment Questions  1. LOCATION: \"Where does it hurt? \"       Lower, right    2. RADIATION: \"Does the pain shoot anywhere else? \" (e.g., chest, back)      Back    3. ONSET: \"When did the pain begin? \" (e.g., minutes, hours or days ago)       About 3 days ago    4. SUDDEN: \"Gradual or sudden onset? \"      Gradually worsening    5. PATTERN \"Does the pain come and go, or is it constant? \"     - If constant: \"Is it getting better, staying the same, or worsening? \"       (Note: Constant means the pain never goes away completely; most serious pain is constant and it progresses)      - If intermittent: \"How long does it last?\" \"Do you have pain now? \"      (Note: Intermittent means the pain goes away completely between bouts)      Constant today    6. SEVERITY: \"How bad is the pain? \"  (e.g., Scale 1-10; mild, moderate, or severe)    - MILD (1-3): doesn't interfere with normal activities, abdomen soft and not tender to touch     - MODERATE (4-7): interferes with normal activities or awakens from sleep, tender to touch     - SEVERE (8-10): excruciating pain, doubled over, unable to do any normal activities       7 out of 10    7. RECURRENT SYMPTOM: \"Have you ever had this type of abdominal pain before? \" If so, ask: \"When was the last time? \" and \"What happened that time? \"       Yes, pt has had this pain when she has had UTI's. Last UTI was 2 weeks ago. Pt was on Cipro. 8. CAUSE: \"What do you think is causing the abdominal pain? \"      Unknown. Pt is not sure if she still has a UTI. 9. RELIEVING/AGGRAVATING FACTORS: \"What makes it better or worse? \" (e.g., movement, antacids, bowel movement)      A heating pad makes pain better. Movement makes the pain worse.     10. OTHER SYMPTOMS: \"Has there been any vomiting, diarrhea, constipation, or urine problems? \"        Nausea, pt did have one episode of burning with urination today    11. PREGNANCY: \"Is there any chance you are pregnant? \" \"When was your last menstrual period? \"        Denies    Protocols used: ABDOMINAL PAIN - FEMALE-ADULT-[place]    Pt is calling with c/o abdominal pain. Recommended that pt be seen today. Provided pt with care advice. Call soft transferred to [address] to schedule appointment. Please do not reply to the triage nurse through this encounter. Any subsequent communication should be directly with the patient.

## 2020-07-06 NOTE — ED PROVIDER NOTES
[address] [facility]        Pt Name: Suki Wyman  MRN: [id number]  [place] 1990  Date of evaluation: 7/6/2020  Provider: Myesha Gutierrez MD  PCP: Alex Valenzuela MD      [address] [place] [address]       Chief Complaint   Patient presents with    Abdominal Pain     patient states she has been having abdominal pain and lower back and right flank pain for 3 days. states it feels like UTI. HISTORY OFPRESENT ILLNESS   (Location/Symptom, Timing/Onset, Context/Setting, Quality, Duration, Modifying Factors,Severity)  Note limiting factors. Suki Wyman is a 34 y.o. female presenting today due to concern for lower abdominal pain and right flank pain along with lower back pain over the last 3 days associated with dysuria. She also feels constipated and last had a bowel movement 2 days ago. No blood in the stool. No diarrhea. She is nauseated but no vomiting. The pain is constant but waxes and wanes in intensity. No falls or trauma. She is concerned she may have a urinary tract infection. Last menstruation was June 11, 2020. She denies any chest pain or shortness of breath and when I specifically asked her about chest pain since this was 1 of the triage complaints, she adamantly denied this and states she never had chest pain and is unsure why this was ever listed. She had been on clomiphene until a couple of months ago and is denying any hormone use at this time and is not on birth control. No leg swelling or history of blood clots. No fever or chills. No other concerns at this time other than the lower abdominal and lower back pain. She denies any vaginal bleeding or discharge. She is not concerned about any STDs. REVIEW OF SYSTEMS    (2-9 systems for level 4, 10 or more for level 5)     Review of Systems   Constitutional: Negative for chills, diaphoresis, fatigue and fever. HENT: Negative for congestion. hcl]    FAMILY HISTORY       Family History   Problem Relation Age of Onset    Kidney Disease Mother     Crohn's Disease Father     Urolithiasis Father     Urolithiasis Sister     Diabetes Maternal Grandmother     Heart Disease Maternal Grandmother     Diabetes Paternal Grandmother     Diabetes Paternal Grandfather     Heart Disease Paternal Grandfather           SOCIAL HISTORY       Social History     Socioeconomic History    Marital status:      Spouse name: None    Number of children: None    Years of education: None    Highest education level: None   Occupational History    Occupation: homemaker   Social Needs    Financial resource strain: None    Food insecurity     Worry: None     Inability: None    Transportation needs     Medical: None     Non-medical: None   Tobacco Use    Smoking status: Never Smoker    Smokeless tobacco: Never Used   Substance and Sexual Activity    Alcohol use: No    Drug use: No    Sexual activity: Yes     Partners: Male   Lifestyle    Physical activity     Days per week: None     Minutes per session: None    Stress: None   Relationships    Social connections     Talks on phone: None     Gets together: None     Attends Zoroastrianism service: None     Active member of club or organization: None     Attends meetings of clubs or organizations: None     Relationship status: None    Intimate partner violence     Fear of current or ex partner: None     Emotionally abused: None     Physically abused: None     Forced sexual activity: None   Other Topics Concern    None   Social History Narrative    None       SCREENINGS                PHYSICAL EXAM    (up to 7 for level 4, 8 or more for level 5)     ED Triage Vitals   BP Temp Temp Source Pulse Resp SpO2 Height Weight   07/06/20 1259 07/06/20 1259 07/06/20 1259 07/06/20 1259 07/06/20 1259 07/06/20 1259 07/06/20 1258 07/06/20 1258   99/69 98.1 °F (36.7 °C) Oral 76 16 99 % 5' 3\" (1.6 m) 145 lb (65.8 kg) Physical Exam  Vitals signs and nursing note reviewed. Constitutional:       General: She is awake. She is not in acute distress. Appearance: Normal appearance. She is well-developed, well-groomed and overweight. She is not ill-appearing, toxic-appearing or diaphoretic. HENT:      Head: Normocephalic and atraumatic. Right Ear: External ear normal.      Left Ear: External ear normal.      Nose: Nose normal.   Eyes:      General:         Right eye: No discharge. Left eye: No discharge. Neck:      Musculoskeletal: Full passive range of motion without pain, normal range of motion and neck supple. Normal range of motion. No edema, erythema or neck rigidity. Trachea: No tracheal deviation. Cardiovascular:      Rate and Rhythm: Normal rate and regular rhythm. Pulses: Normal pulses. Pulmonary:      Effort: Pulmonary effort is normal. No accessory muscle usage or respiratory distress. Breath sounds: Normal breath sounds. No stridor. No decreased breath sounds, wheezing, rhonchi or rales. Chest:      Chest wall: No tenderness. Abdominal:      General: Abdomen is flat. Bowel sounds are normal. There is no distension. Palpations: Abdomen is soft. Abdomen is not rigid. Tenderness: There is abdominal tenderness (mild) in the right lower quadrant and suprapubic area. There is right CVA tenderness (mild). There is no left CVA tenderness, guarding or rebound. Negative signs include Hurst's sign and McBurney's sign. Musculoskeletal: Normal range of motion. General: No swelling, deformity or signs of injury. Cervical back: She exhibits normal range of motion, no tenderness and no bony tenderness. Thoracic back: She exhibits pain. She exhibits normal range of motion, no tenderness and no bony tenderness. Lumbar back: She exhibits tenderness and pain. She exhibits normal range of motion and no bony tenderness.         Back:       Right lower leg: No edema. Left lower leg: No edema. Skin:     General: Skin is warm and dry. Coloration: Skin is not jaundiced or pale. Findings: No bruising, erythema, lesion or rash. Neurological:      General: No focal deficit present. Mental Status: She is alert and oriented to person, place, and time. Mental status is at baseline. GCS: GCS eye subscore is 4. GCS verbal subscore is 5. GCS motor subscore is 6. Sensory: No sensory deficit. Motor: No weakness, tremor, atrophy, abnormal muscle tone or seizure activity. Psychiatric:         Mood and Affect: Mood normal.         Behavior: Behavior normal. Behavior is cooperative. DIAGNOSTIC RESULTS   :    Labs Reviewed   URINALYSIS - Abnormal; Notable for the following components:       Result Value    Color, UA DARK YELLOW (*)     Clarity, UA SL CLOUDY (*)     Ketones, Urine TRACE (*)     Protein, UA TRACE (*)     Leukocyte Esterase, Urine SMALL (*)     All other components within normal limits    Narrative:     Performed at:  [facility]. [facility]  [address] [place] [address]. [place] [address] [place]   Phone [phone]   MICROSCOPIC URINALYSIS - Abnormal; Notable for the following components:    Mucus, UA 2+ (*)     WBC, UA 6-9 (*)     Bacteria, UA 1+ (*)     Crystals, UA 1+ Ca. Oxalate (*)     All other components within normal limits    Narrative:     Performed at:  [facility]. [facility]  [address] [place] [address]. [place] [address] [place]   Phone [phone]   PREGNANCY, URINE    Narrative:     Performed at:  [facility]. [facility]  [address] [place] [address]. [place] [address] [place]   Phone [phone]   CBC WITH AUTO DIFFERENTIAL    Narrative:     Performed at:  [facility]. [facility]  [address] [place] [address].  [place] [address] [place]   Phone [phone]   COMPREHENSIVE METABOLIC PANEL W/ REFLEX TO MG FOR LOW K Narrative:     Performed at:  [facility]. [facility]  [address] [facility],  [facility] [address] [facility], [address]    Phone [phone]   LIPASE    Narrative:     Performed at:  [facility]. [facility]  [address] [facility] [address] [facility], [address]    Phone [phone]       All other labs were within normal range or not returned asof this dictation. EKG: All EKG's are interpreted by the Emergency Department Physician who either signs or Co-signs this chart in the absence of a cardiologist.        RADIOLOGY:   Non-plain film images such as CT, Ultrasound and MRI are read by the radiologist. Marnette Pronto images are visualized and preliminarily interpreted by the  ED Provider with the belowfindings:        Interpretation per the Radiologist below, if available at the time of this note:    CT ABDOMEN PELVIS W IV CONTRAST Additional Contrast? None   Final Result   No acute abdominopelvic abnormality.                PROCEDURES   Unless otherwise noted below, none     Procedures    CRITICAL CARE TIME   N/A    CONSULTS:  None    EMERGENCY DEPARTMENT COURSE and DIFFERENTIAL DIAGNOSIS/MDM:   Vitals:    Vitals:    07/06/20 1400 07/06/20 1500 07/06/20 1605 07/06/20 1645   BP: 94/66 89/66 104/64 100/64   Pulse: 77 66 74 72   Resp: 16 16 18 16   Temp:       TempSrc:       SpO2: 100% 100% 100% 100%   Weight:       Height:           Patient was given the following medications:  Medications   cefTRIAXone (ROCEPHIN) 1 g in sterile water 10 mL IV syringe (0 g Intravenous Stopped 7/6/20 1359)   ketorolac (TORADOL) injection 15 mg (15 mg Intravenous Given 7/6/20 1352)   ondansetron (ZOFRAN) injection 4 mg (4 mg Intravenous Given 7/6/20 1353)   0.9 % sodium chloride bolus (0 mLs Intravenous Stopped 7/6/20 1503)   iopamidol (ISOVUE-370) 76 % injection 75 mL (75 mLs Intravenous Given 7/6/20 1416)     Patient was evaluated for 3 days of persistent lower abdominal and flank pain associated with dysuria and concerned she has a urinary tract infection. Urinalysis was concerning for possibility of infection and with the complaint of flank discomfort and history of kidney stones, I did order a CT which showed no obstructing stone or signs of appendicitis. No mention of large ovarian cyst on the CT. Upon repeat evaluation she was feeling much better. I did offer pelvic exam but she declined. Since the pain was mostly gone, I do feel that torsion is unlikely although I did mention that there is a chance if it was torsion, this could lead to death of the ovary which could cause issues with conception, although I do feel this is less likely. At this time, she is willing to accept this risk and would like to go home. She knows to return to the emergency department within the next 12 to 24 hours for any worsening abdominal pain, vomiting, fever, but otherwise follow-up with her primary doctor or gynecologist for any other concerns, especially if pain does not improve in the next couple of days. She was well-appearing and in no acute distress at time of discharge and felt comfortable with this plan. The patient tolerated their visit well. The patient and / or the family were informed of the results of any tests, a time was given to answer questions. FINAL IMPRESSION      1. Acute right flank pain    2. Acute cystitis without hematuria    3.  Lower abdominal pain          DISPOSITION/PLAN   DISPOSITION Decision To Discharge 07/06/2020 04:40:53 PM      PATIENT REFERRED TO:  [facility] Emergency Department  [address] [facility] [address]  Go to   If symptoms worsen    Ketty Eduardo MD  [phone] [facility]  [phone]    In 3 days  For repeat evaluation for any other concerns      DISCHARGEMEDICATIONS:  Discharge Medication List as of 7/6/2020  4:43 PM      START taking these medications    Details   cefdinir (OMNICEF) 300 MG capsule Take

## 2020-07-07 ASSESSMENT — ENCOUNTER SYMPTOMS
SHORTNESS OF BREATH: 0
CONSTIPATION: 1
BACK PAIN: 1
DIARRHEA: 0
VOMITING: 0
ABDOMINAL DISTENTION: 0
NAUSEA: 1
ANAL BLEEDING: 0
CHEST TIGHTNESS: 0
BLOOD IN STOOL: 0
COUGH: 0
ABDOMINAL PAIN: 1
COLOR CHANGE: 0

## 2020-07-13 ENCOUNTER — OFFICE VISIT (OUTPATIENT)
Dept: FAMILY MEDICINE CLINIC | Age: 30
End: 2020-07-13
Payer: COMMERCIAL

## 2020-07-13 VITALS
DIASTOLIC BLOOD PRESSURE: 80 MMHG | OXYGEN SATURATION: 97 % | WEIGHT: 141 LBS | SYSTOLIC BLOOD PRESSURE: 104 MMHG | HEART RATE: 89 BPM | BODY MASS INDEX: 24.98 KG/M2 | TEMPERATURE: 99 F

## 2020-07-13 LAB
CONTROL: PRESENT
GONADOTROPIN, CHORIONIC (HCG) QUANT: 30.2 MIU/ML
HCG QUALITATIVE: POSITIVE
PREGNANCY TEST URINE, POC: POSITIVE
PROGESTERONE LEVEL: 21.33 NG/ML

## 2020-07-13 PROCEDURE — 81025 URINE PREGNANCY TEST: CPT | Performed by: NURSE PRACTITIONER

## 2020-07-13 PROCEDURE — 99213 OFFICE O/P EST LOW 20 MIN: CPT | Performed by: NURSE PRACTITIONER

## 2020-07-13 PROCEDURE — 1036F TOBACCO NON-USER: CPT | Performed by: NURSE PRACTITIONER

## 2020-07-13 PROCEDURE — 36415 COLL VENOUS BLD VENIPUNCTURE: CPT | Performed by: NURSE PRACTITIONER

## 2020-07-13 PROCEDURE — G8420 CALC BMI NORM PARAMETERS: HCPCS | Performed by: NURSE PRACTITIONER

## 2020-07-13 PROCEDURE — G8427 DOCREV CUR MEDS BY ELIG CLIN: HCPCS | Performed by: NURSE PRACTITIONER

## 2020-07-13 ASSESSMENT — ENCOUNTER SYMPTOMS
SINUS PAIN: 0
COLOR CHANGE: 0
WHEEZING: 0
VOMITING: 0
DIARRHEA: 0
EYE DISCHARGE: 0
EYE PAIN: 0
CHEST TIGHTNESS: 0
SINUS PRESSURE: 0
CONSTIPATION: 0
PHOTOPHOBIA: 0
BLOOD IN STOOL: 0
SORE THROAT: 0
NAUSEA: 0
VOICE CHANGE: 0
RHINORRHEA: 0
EYE ITCHING: 0
COUGH: 0
TROUBLE SWALLOWING: 0
ABDOMINAL PAIN: 0
STRIDOR: 0
BACK PAIN: 0
SHORTNESS OF BREATH: 0
EYE REDNESS: 0
CHOKING: 0

## 2020-07-13 NOTE — PROGRESS NOTES
light-headedness, numbness and headaches. Hematological: Negative for adenopathy. Does not bruise/bleed easily. Psychiatric/Behavioral: Negative for agitation, behavioral problems, confusion, decreased concentration, dysphoric mood, hallucinations, self-injury, sleep disturbance and suicidal ideas. The patient is not nervous/anxious and is not hyperactive. Prior to Visit Medications    Medication Sig Taking? Authorizing Provider   PARoxetine (PAXIL) 10 MG tablet Take 1 tablet by mouth daily Yes Andrew Bhatia MD   trospium (SANCTURA) 20 MG tablet   Historical Provider, MD   traZODone (DESYREL) 100 MG tablet TAKE 1 TABLET BY MOUTH NIGHTLY AS NEEDED FOR SLEEP  Patient not taking: Reported on 7/13/2020  Andrew Bhatia MD   ondansetron [facility]) 4 MG tablet Take 1 tablet by mouth every 8 hours as needed for Nausea or Vomiting  Patient not taking: Reported on 7/13/2020  TONJA Bergman - CNP       Allergies   Allergen Reactions    Hydromorphone Hcl Anaphylaxis and Shortness Of Breath    Metoclopramide     Dilaudid [Hydromorphone Hcl] Rash       OBJECTIVE:      BP Readings from Last 2 Encounters:   07/13/20 104/80   07/06/20 100/64       Wt Readings from Last 3 Encounters:   07/13/20 141 lb (64 kg)   07/06/20 145 lb (65.8 kg)   05/27/20 143 lb (64.9 kg)       Physical Exam  Constitutional:       General: She is not in acute distress. Appearance: She is normal weight. HENT:      Right Ear: External ear normal.      Left Ear: External ear normal.      Nose: Nose normal.   Neck:      Musculoskeletal: Normal range of motion. Cardiovascular:      Rate and Rhythm: Normal rate and regular rhythm. Pulses: Normal pulses. Heart sounds: Normal heart sounds. No murmur. No friction rub. No gallop. Pulmonary:      Effort: Pulmonary effort is normal. No respiratory distress. Breath sounds: Normal breath sounds. No stridor. No wheezing, rhonchi or rales. Chest:      Chest wall: No tenderness.

## 2020-07-16 LAB
ESTRADIOL LEVEL: 472 PG/ML
ESTROGEN TOTAL: 998 PG/ML
ESTRONE: 526 PG/ML

## 2020-07-21 ENCOUNTER — HOSPITAL ENCOUNTER (EMERGENCY)
Age: 30
Discharge: HOME | End: 2020-07-21
Attending: EMERGENCY MEDICINE
Payer: COMMERCIAL

## 2020-07-21 ENCOUNTER — APPOINTMENT (OUTPATIENT)
Dept: ULTRASOUND IMAGING | Age: 30
End: 2020-07-21
Payer: COMMERCIAL

## 2020-07-21 VITALS
OXYGEN SATURATION: 100 % | BODY MASS INDEX: 24.8 KG/M2 | WEIGHT: 140 LBS | HEART RATE: 71 BPM | TEMPERATURE: 98.2 F | DIASTOLIC BLOOD PRESSURE: 83 MMHG | RESPIRATION RATE: 15 BRPM | SYSTOLIC BLOOD PRESSURE: 110 MMHG

## 2020-07-21 LAB
A/G RATIO: 1.9 (ref 1.1–2.2)
ABO/RH: NORMAL
ALBUMIN SERPL-MCNC: 4.8 G/DL (ref 3.4–5)
ALP BLD-CCNC: 45 U/L (ref 40–129)
ALT SERPL-CCNC: 27 U/L (ref 10–40)
AMORPHOUS: ABNORMAL /HPF
ANION GAP SERPL CALCULATED.3IONS-SCNC: 13 MMOL/L (ref 3–16)
ANTIBODY SCREEN: NORMAL
AST SERPL-CCNC: 40 U/L (ref 15–37)
BACTERIA: ABNORMAL /HPF
BASOPHILS ABSOLUTE: 0.1 K/UL (ref 0–0.2)
BASOPHILS RELATIVE PERCENT: 0.9 %
BILIRUB SERPL-MCNC: <0.2 MG/DL (ref 0–1)
BILIRUBIN URINE: NEGATIVE
BLOOD, URINE: NEGATIVE
BUN BLDV-MCNC: 11 MG/DL (ref 7–20)
CALCIUM SERPL-MCNC: 8.8 MG/DL (ref 8.3–10.6)
CHLORIDE BLD-SCNC: 103 MMOL/L (ref 99–110)
CLARITY: ABNORMAL
CO2: 21 MMOL/L (ref 21–32)
COLOR: YELLOW
CREAT SERPL-MCNC: <0.5 MG/DL (ref 0.6–1.1)
CRYSTALS, UA: ABNORMAL /HPF
EOSINOPHILS ABSOLUTE: 0.1 K/UL (ref 0–0.6)
EOSINOPHILS RELATIVE PERCENT: 1.7 %
EPITHELIAL CELLS, UA: >100 /HPF (ref 0–5)
GFR AFRICAN AMERICAN: >60
GFR NON-AFRICAN AMERICAN: >60
GLOBULIN: 2.5 G/DL
GLUCOSE BLD-MCNC: 101 MG/DL (ref 70–99)
GLUCOSE URINE: NEGATIVE MG/DL
GONADOTROPIN, CHORIONIC (HCG) QUANT: 35.3 MIU/ML
HCT VFR BLD CALC: 37.2 % (ref 36–48)
HEMOGLOBIN: 12.3 G/DL (ref 12–16)
KETONES, URINE: ABNORMAL MG/DL
LEUKOCYTE ESTERASE, URINE: ABNORMAL
LIPASE: 24 U/L (ref 13–60)
LYMPHOCYTES ABSOLUTE: 2.7 K/UL (ref 1–5.1)
LYMPHOCYTES RELATIVE PERCENT: 44 %
MCH RBC QN AUTO: 28.3 PG (ref 26–34)
MCHC RBC AUTO-ENTMCNC: 33 G/DL (ref 31–36)
MCV RBC AUTO: 85.9 FL (ref 80–100)
MICROSCOPIC EXAMINATION: YES
MONOCYTES ABSOLUTE: 0.4 K/UL (ref 0–1.3)
MONOCYTES RELATIVE PERCENT: 6 %
MUCUS: ABNORMAL /LPF
NEUTROPHILS ABSOLUTE: 2.9 K/UL (ref 1.7–7.7)
NEUTROPHILS RELATIVE PERCENT: 47.4 %
NITRITE, URINE: NEGATIVE
PDW BLD-RTO: 13.6 % (ref 12.4–15.4)
PH UA: 6.5 (ref 5–8)
PLATELET # BLD: 285 K/UL (ref 135–450)
PMV BLD AUTO: 7.8 FL (ref 5–10.5)
POTASSIUM REFLEX MAGNESIUM: 4.7 MMOL/L (ref 3.5–5.1)
PROTEIN UA: NEGATIVE MG/DL
RBC # BLD: 4.33 M/UL (ref 4–5.2)
RBC UA: ABNORMAL /HPF (ref 0–4)
SODIUM BLD-SCNC: 137 MMOL/L (ref 136–145)
SPECIFIC GRAVITY UA: 1.02 (ref 1–1.03)
TOTAL PROTEIN: 7.3 G/DL (ref 6.4–8.2)
URINE TYPE: ABNORMAL
UROBILINOGEN, URINE: 1 E.U./DL
WBC # BLD: 6 K/UL (ref 4–11)
WBC UA: ABNORMAL /HPF (ref 0–5)

## 2020-07-21 PROCEDURE — 99284 EMERGENCY DEPT VISIT MOD MDM: CPT

## 2020-07-21 PROCEDURE — 80053 COMPREHEN METABOLIC PANEL: CPT

## 2020-07-21 PROCEDURE — 6370000000 HC RX 637 (ALT 250 FOR IP): Performed by: EMERGENCY MEDICINE

## 2020-07-21 PROCEDURE — 86900 BLOOD TYPING SEROLOGIC ABO: CPT

## 2020-07-21 PROCEDURE — 86901 BLOOD TYPING SEROLOGIC RH(D): CPT

## 2020-07-21 PROCEDURE — 2580000003 HC RX 258: Performed by: EMERGENCY MEDICINE

## 2020-07-21 PROCEDURE — 76817 TRANSVAGINAL US OBSTETRIC: CPT

## 2020-07-21 PROCEDURE — 76801 OB US < 14 WKS SINGLE FETUS: CPT

## 2020-07-21 PROCEDURE — 81001 URINALYSIS AUTO W/SCOPE: CPT

## 2020-07-21 PROCEDURE — 84702 CHORIONIC GONADOTROPIN TEST: CPT

## 2020-07-21 PROCEDURE — 86850 RBC ANTIBODY SCREEN: CPT

## 2020-07-21 PROCEDURE — 83690 ASSAY OF LIPASE: CPT

## 2020-07-21 PROCEDURE — 85025 COMPLETE CBC W/AUTO DIFF WBC: CPT

## 2020-07-21 RX ORDER — 0.9 % SODIUM CHLORIDE 0.9 %
1000 INTRAVENOUS SOLUTION INTRAVENOUS ONCE
Status: COMPLETED | OUTPATIENT
Start: 2020-07-21 | End: 2020-07-21

## 2020-07-21 RX ORDER — ACETAMINOPHEN 500 MG
1000 TABLET ORAL ONCE
Status: COMPLETED | OUTPATIENT
Start: 2020-07-21 | End: 2020-07-21

## 2020-07-21 RX ADMIN — ACETAMINOPHEN 1000 MG: 500 TABLET ORAL at 19:35

## 2020-07-21 RX ADMIN — SODIUM CHLORIDE 1000 ML: 9 INJECTION, SOLUTION INTRAVENOUS at 19:38

## 2020-07-21 ASSESSMENT — ENCOUNTER SYMPTOMS
ABDOMINAL PAIN: 0
VOMITING: 0
NAUSEA: 0
COUGH: 0
RHINORRHEA: 0
BACK PAIN: 0
DIARRHEA: 0
ABDOMINAL DISTENTION: 0
WHEEZING: 0
SHORTNESS OF BREATH: 0
PHOTOPHOBIA: 0

## 2020-07-21 ASSESSMENT — PAIN SCALES - GENERAL: PAINLEVEL_OUTOF10: 2

## 2020-07-21 NOTE — ED PROVIDER NOTES
taking: Reported on 7/13/2020 6/1/20   Schuyler Petersen MD   ondansetron [facility] 4 MG tablet Take 1 tablet by mouth every 8 hours as needed for Nausea or Vomiting  Patient not taking: Reported on 7/13/2020 5/27/20   Armando Holly, APRN - CNP       Social history:  reports that she has never smoked. She has never used smokeless tobacco. She reports that she does not drink alcohol or use drugs. Family history:    Family History   Problem Relation Age of Onset    Kidney Disease Mother     Crohn's Disease Father     Urolithiasis Father     Urolithiasis Sister     Diabetes Maternal Grandmother     Heart Disease Maternal Grandmother     Diabetes Paternal Grandmother     Diabetes Paternal Grandfather     Heart Disease Paternal Grandfather          ROS  Review of Systems   Constitutional: Negative for chills and fever. HENT: Negative for congestion and rhinorrhea. Eyes: Negative for photophobia and visual disturbance. Respiratory: Negative for cough, shortness of breath and wheezing. Cardiovascular: Negative for chest pain and palpitations. Gastrointestinal: Negative for abdominal distention, abdominal pain, diarrhea, nausea and vomiting. Genitourinary: Positive for pelvic pain and vaginal bleeding. Negative for dysuria, flank pain, hematuria and menstrual problem. Musculoskeletal: Negative for back pain and neck pain. Skin: Negative for rash and wound. Neurological: Negative for syncope and weakness. Psychiatric/Behavioral: Negative for agitation and confusion. Exam  ED Triage Vitals   BP Temp Temp src Pulse Resp SpO2 Height Weight   -- -- -- -- -- -- -- --       Physical Exam  Vitals signs and nursing note reviewed. Constitutional:       General: She is not in acute distress. Appearance: She is well-developed. Comments: Eating Gage Ethan comfortably in the bed   HENT:      Head: Normocephalic and atraumatic.    Eyes:      Extraocular Movements: Extraocular movements intact. Pupils: Pupils are equal, round, and reactive to light. Neck:      Musculoskeletal: Normal range of motion and neck supple. Cardiovascular:      Rate and Rhythm: Normal rate and regular rhythm. Heart sounds: No murmur. Pulmonary:      Effort: Pulmonary effort is normal.      Breath sounds: Normal breath sounds. Abdominal:      General: There is no distension. Palpations: Abdomen is soft. Tenderness: There is no abdominal tenderness. There is no right CVA tenderness, left CVA tenderness, guarding or rebound. Musculoskeletal: Normal range of motion. General: No deformity. Skin:     General: Skin is warm. Findings: No rash. Neurological:      Mental Status: She is alert and oriented to person, place, and time. Motor: No abnormal muscle tone. Coordination: Coordination normal.   Psychiatric:         Mood and Affect: Mood normal.         Behavior: Behavior normal.           ED Course    ED Medication Orders (From admission, onward)    Start Ordered     Status Ordering Provider    07/21/20 1845 07/21/20 1834  0.9 % sodium chloride bolus  ONCE      Last MAR action:  Stopped - by Luisito Laguna on 07/21/20 at 2047 MANUEL SILVERIO    07/21/20 1845 07/21/20 1834  acetaminophen (TYLENOL) tablet 1,000 mg  ONCE      Last MAR action:  Given - by Luisito Laguna on 07/21/20 at [facility]            Radiology  Us Ob Less Than 14 Weeks Single Or First Gestation    Result Date: 7/21/2020  EXAMINATION: FIRST TRIMESTER OBSTETRIC ULTRASOUND 7/21/2020 TECHNIQUE: Transabdominal and transvaginal first trimester obstetric pelvic ultrasound was performed with color Doppler flow evaluation.; Transvaginal first trimester obstetric pelvic ultrasound was performed with color Doppler flow evaluation.  COMPARISON: None HISTORY: ORDERING SYSTEM PROVIDED HISTORY: 1st trim, pain, bleeding TECHNOLOGIST PROVIDED HISTORY: Reason for exam:->1st trim, pain, bleeding; ORDERING orders placed or performed during the hospital encounter of 07/21/20   CBC Auto Differential   Result Value Ref Range    WBC 6.0 4.0 - 11.0 K/uL    RBC 4.33 4.00 - 5.20 M/uL    Hemoglobin 12.3 12.0 - 16.0 g/dL    Hematocrit 37.2 36.0 - 48.0 %    MCV 85.9 80.0 - 100.0 fL    MCH 28.3 26.0 - 34.0 pg    MCHC 33.0 31.0 - 36.0 g/dL    RDW 13.6 12.4 - 15.4 %    Platelets 008 142 - 640 K/uL    MPV 7.8 5.0 - 10.5 fL    Neutrophils % 47.4 %    Lymphocytes % 44.0 %    Monocytes % 6.0 %    Eosinophils % 1.7 %    Basophils % 0.9 %    Neutrophils Absolute 2.9 1.7 - 7.7 K/uL    Lymphocytes Absolute 2.7 1.0 - 5.1 K/uL    Monocytes Absolute 0.4 0.0 - 1.3 K/uL    Eosinophils Absolute 0.1 0.0 - 0.6 K/uL    Basophils Absolute 0.1 0.0 - 0.2 K/uL   Comprehensive Metabolic Panel w/ Reflex to MG   Result Value Ref Range    Sodium 137 136 - 145 mmol/L    Potassium reflex Magnesium 4.7 3.5 - 5.1 mmol/L    Chloride 103 99 - 110 mmol/L    CO2 21 21 - 32 mmol/L    Anion Gap 13 3 - 16    Glucose 101 (H) 70 - 99 mg/dL    BUN 11 7 - 20 mg/dL    CREATININE <0.5 (L) 0.6 - 1.1 mg/dL    GFR Non-African American >60 >60    GFR African American >60 >60    Calcium 8.8 8.3 - 10.6 mg/dL    Total Protein 7.3 6.4 - 8.2 g/dL    Alb 4.8 3.4 - 5.0 g/dL    Albumin/Globulin Ratio 1.9 1.1 - 2.2    Total Bilirubin <0.2 0.0 - 1.0 mg/dL    Alkaline Phosphatase 45 40 - 129 U/L    ALT 27 10 - 40 U/L    AST 40 (H) 15 - 37 U/L    Globulin 2.5 g/dL   Lipase   Result Value Ref Range    Lipase 24.0 13.0 - 60.0 U/L   HCG, Quantitative, Pregnancy   Result Value Ref Range    hCG Quant 35.3 <5.0 mIU/mL   Urinalysis, reflex to microscopic   Result Value Ref Range    Color, UA Yellow Straw/Yellow    Clarity, UA SL CLOUDY (A) Clear    Glucose, Ur Negative Negative mg/dL    Bilirubin Urine Negative Negative    Ketones, Urine TRACE (A) Negative mg/dL    Specific Gravity, UA 1.025 1.005 - 1.030    Blood, Urine Negative Negative    pH, UA 6.5 5.0 - 8.0    Protein, UA Negative Negative mg/dL    Urobilinogen, Urine 1.0 <2.0 E.U./dL    Nitrite, Urine Negative Negative    Leukocyte Esterase, Urine LARGE (A) Negative    Microscopic Examination YES     Urine Type NotGiven    Microscopic Urinalysis   Result Value Ref Range    Mucus, UA 4+ (A) None Seen /LPF    WBC, UA 10-20 (A) 0 - 5 /HPF    RBC, UA 3-4 0 - 4 /HPF    Epithelial Cells, UA >100 (A) 0 - 5 /HPF    Bacteria, UA 3+ (A) None Seen /HPF    Amorphous, UA 2+ /HPF    Crystals, UA Few Ca. Oxalate (A) None Seen /HPF   TYPE AND SCREEN   Result Value Ref Range    ABO/Rh A POS     Antibody Screen NEG          MDM  26-year-old female, G7, P3 first trimester pregnancy who presents with pelvic cramping and vaginal spotting today. On exam she is well-appearing no acute distress, her vital signs are overall reassuring. Her exam is reassuring, she has no significant abdominal or pelvic tenderness. Patient declined pelvic exam.  Her labs nonsignificant for anemia. No evidence of UTI. Her beta quant is in the 30s which patient reports was taken to 3 days ago and was in the 62s. If this is true this be consistent with miscarriage. Unable to assess cervix given she declined pelvic exam however feel that this is more consistent than ectopic at this point. Pelvic ultrasound shows no evidence of any abnormal collection outside of the uterus. Not a candidate for RhoGam.  Patient states she feels comfortable following up with her OB/GYN doctor in [facility] in the next 24 to 48 hours. I discussed return precautions at length with the patient. I did stress to her that I cannot rule out ectopic pregnancy on 100% at this point and she expresses understanding of this. At this point I do not feel the patient requires further work up and it is reasonable to discharge the patient. I had a discussion with the patient and/or their surrogate regarding diagnosis, diagnostic testing results, treatment/ plan of care, and follow up. There was shared decision-making between myself as well as the patient and/or their surrogate and we are all in agreement with discharge home. There was an opportunity for questions and all questions were answered to the best of my ability and to the satisfaction of the patient and/or patient family. Patient agreed to follow up with OB for further evaluation/treatment. The patient was given strict return precautions as we discussed symptoms that would necessitate return to the ED. Patient will return to ED for new/worsening symptoms. The patient verbalized their understanding and agreement with the above plan. Please refer to AVS for further details regarding discharge instructions. Clinical Impression:  1. Threatened miscarriage    2. Vaginal bleeding in pregnancy          Disposition:  Discharge to home in good condition. Blood pressure 110/83, pulse 71, temperature 98.2 °F (36.8 °C), resp. rate 15, weight 140 lb (63.5 kg), last menstrual period 06/11/2020, SpO2 100 %, not currently breastfeeding. Patient was given scripts for the following medications. I counseled patient how to take these medications. Discharge Medication List as of 7/21/2020  8:48 PM          Disposition referral (if applicable): Your OB doctor    Schedule an appointment as soon as possible for a visit in 2 days          Total critical care time is 0 minutes, which excludes separately billable procedures and updating family. Time spent is specifically for management of the presenting complaint and symptoms initially, direct bedside care, reevaluation, review of records, and consultation. There was a high probability of clinically significant life-threatening deterioration in the patient's condition, which required my urgent intervention.      This chart was generated in part by using Dragon Dictation system and may contain errors related to that system including errors in grammar, punctuation, and spelling, as well as words and phrases that may be inappropriate. If there are any questions or concerns please feel free to contact the dictating provider for clarification.      MD Ellen Villegas MD  07/22/20 [id number]

## 2020-09-18 ENCOUNTER — TELEPHONE (OUTPATIENT)
Dept: FAMILY MEDICINE CLINIC | Age: 30
End: 2020-09-18

## 2020-09-18 NOTE — TELEPHONE ENCOUNTER
----- Message from Lupe Sebastian sent at 9/18/2020  9:33 AM EDT -----  Subject: Message to Provider    QUESTIONS  Information for Provider? Patient is 8 weeks pregnant and was advised by   OB to no longer take Paxil   10 mg taken 1 x daily due to harm of baby. Is requesting a medicatio to   help with anxiety and panic attacks that will not harm baby. Please call   and adv  ---------------------------------------------------------------------------  --------------  CALL BACK INFO  What is the best way for the office to contact you? OK to leave message on   voicemail  Preferred Call Back Phone Number? [phone]  ---------------------------------------------------------------------------  --------------  SCRIPT ANSWERS  Relationship to Patient? Other  Representative Name? Robert Chowdaryannemarie   spouse  Is the Representative on the appropriate HIPAA document in Epic?  Yes

## 2020-09-18 NOTE — TELEPHONE ENCOUNTER
It appears lexapro or zoloft would be better options/safer. Had been on lexapro in the past.  pls check w/ her OB if they are ok w/ those. I would prefer her to be off of medications if possible.

## 2020-09-22 ENCOUNTER — TELEPHONE (OUTPATIENT)
Dept: FAMILY MEDICINE CLINIC | Age: 30
End: 2020-09-22

## 2020-09-22 NOTE — TELEPHONE ENCOUNTER
Ok to refer, but typically we would see here first to decide who to send her to.   For example, it may be a different specialist for her back than it would be for her knee

## 2020-10-26 RX ORDER — ONDANSETRON 4 MG/1
4 TABLET, FILM COATED ORAL EVERY 8 HOURS PRN
Qty: 20 TABLET | Refills: 0 | OUTPATIENT
Start: 2020-10-26

## 2020-11-17 ENCOUNTER — HOSPITAL ENCOUNTER (EMERGENCY)
Age: 30
Discharge: HOME | End: 2020-11-17
Attending: EMERGENCY MEDICINE
Payer: COMMERCIAL

## 2020-11-17 VITALS
DIASTOLIC BLOOD PRESSURE: 62 MMHG | SYSTOLIC BLOOD PRESSURE: 97 MMHG | WEIGHT: 148 LBS | HEART RATE: 72 BPM | OXYGEN SATURATION: 100 % | BODY MASS INDEX: 26.22 KG/M2 | RESPIRATION RATE: 18 BRPM | TEMPERATURE: 98.2 F | HEIGHT: 63 IN

## 2020-11-17 LAB
A/G RATIO: 1.2 (ref 1.1–2.2)
ALBUMIN SERPL-MCNC: 4.3 G/DL (ref 3.4–5)
ALP BLD-CCNC: 52 U/L (ref 40–129)
ALT SERPL-CCNC: 12 U/L (ref 10–40)
ANION GAP SERPL CALCULATED.3IONS-SCNC: 10 MMOL/L (ref 3–16)
AST SERPL-CCNC: 18 U/L (ref 15–37)
BACTERIA: ABNORMAL /HPF
BASOPHILS ABSOLUTE: 0 K/UL (ref 0–0.2)
BASOPHILS RELATIVE PERCENT: 0.1 %
BILIRUB SERPL-MCNC: <0.2 MG/DL (ref 0–1)
BILIRUBIN URINE: NEGATIVE
BLOOD, URINE: NEGATIVE
BUN BLDV-MCNC: 6 MG/DL (ref 7–20)
CALCIUM SERPL-MCNC: 9 MG/DL (ref 8.3–10.6)
CHLORIDE BLD-SCNC: 102 MMOL/L (ref 99–110)
CLARITY: ABNORMAL
CO2: 23 MMOL/L (ref 21–32)
COLOR: YELLOW
CREAT SERPL-MCNC: <0.5 MG/DL (ref 0.6–1.1)
EOSINOPHILS ABSOLUTE: 0 K/UL (ref 0–0.6)
EOSINOPHILS RELATIVE PERCENT: 0.4 %
EPITHELIAL CELLS, UA: ABNORMAL /HPF (ref 0–5)
GFR AFRICAN AMERICAN: >60
GFR NON-AFRICAN AMERICAN: >60
GLOBULIN: 3.5 G/DL
GLUCOSE BLD-MCNC: 81 MG/DL (ref 70–99)
GLUCOSE URINE: NEGATIVE MG/DL
HCT VFR BLD CALC: 36.8 % (ref 36–48)
HEMOGLOBIN: 12.2 G/DL (ref 12–16)
KETONES, URINE: 15 MG/DL
LEUKOCYTE ESTERASE, URINE: ABNORMAL
LYMPHOCYTES ABSOLUTE: 2.1 K/UL (ref 1–5.1)
LYMPHOCYTES RELATIVE PERCENT: 28.9 %
MCH RBC QN AUTO: 28.4 PG (ref 26–34)
MCHC RBC AUTO-ENTMCNC: 33.2 G/DL (ref 31–36)
MCV RBC AUTO: 85.6 FL (ref 80–100)
MICROSCOPIC EXAMINATION: YES
MONOCYTES ABSOLUTE: 0.3 K/UL (ref 0–1.3)
MONOCYTES RELATIVE PERCENT: 4.8 %
MUCUS: ABNORMAL /LPF
NEUTROPHILS ABSOLUTE: 4.8 K/UL (ref 1.7–7.7)
NEUTROPHILS RELATIVE PERCENT: 65.8 %
NITRITE, URINE: NEGATIVE
PDW BLD-RTO: 13.5 % (ref 12.4–15.4)
PH UA: 7 (ref 5–8)
PLATELET # BLD: 275 K/UL (ref 135–450)
PMV BLD AUTO: 7.7 FL (ref 5–10.5)
POTASSIUM REFLEX MAGNESIUM: 3.7 MMOL/L (ref 3.5–5.1)
PROTEIN UA: NEGATIVE MG/DL
RBC # BLD: 4.3 M/UL (ref 4–5.2)
RBC UA: ABNORMAL /HPF (ref 0–4)
SODIUM BLD-SCNC: 135 MMOL/L (ref 136–145)
SPECIFIC GRAVITY UA: 1.02 (ref 1–1.03)
TOTAL PROTEIN: 7.8 G/DL (ref 6.4–8.2)
URINE TYPE: ABNORMAL
UROBILINOGEN, URINE: 0.2 E.U./DL
WBC # BLD: 7.2 K/UL (ref 4–11)
WBC UA: ABNORMAL /HPF (ref 0–5)

## 2020-11-17 PROCEDURE — 2580000003 HC RX 258: Performed by: EMERGENCY MEDICINE

## 2020-11-17 PROCEDURE — 99284 EMERGENCY DEPT VISIT MOD MDM: CPT

## 2020-11-17 PROCEDURE — 96365 THER/PROPH/DIAG IV INF INIT: CPT

## 2020-11-17 PROCEDURE — 81001 URINALYSIS AUTO W/SCOPE: CPT

## 2020-11-17 PROCEDURE — 6360000002 HC RX W HCPCS: Performed by: EMERGENCY MEDICINE

## 2020-11-17 PROCEDURE — 87086 URINE CULTURE/COLONY COUNT: CPT

## 2020-11-17 PROCEDURE — 85025 COMPLETE CBC W/AUTO DIFF WBC: CPT

## 2020-11-17 PROCEDURE — 80053 COMPREHEN METABOLIC PANEL: CPT

## 2020-11-17 PROCEDURE — 96375 TX/PRO/DX INJ NEW DRUG ADDON: CPT

## 2020-11-17 PROCEDURE — 96361 HYDRATE IV INFUSION ADD-ON: CPT

## 2020-11-17 RX ORDER — 0.9 % SODIUM CHLORIDE 0.9 %
1000 INTRAVENOUS SOLUTION INTRAVENOUS ONCE
Status: COMPLETED | OUTPATIENT
Start: 2020-11-17 | End: 2020-11-17

## 2020-11-17 RX ORDER — DOXYLAMINE SUCCINATE AND PYRIDOXINE HYDROCHLORIDE, DELAYED RELEASE TABLETS 10 MG/10 MG 10; 10 MG/1; MG/1
TABLET, DELAYED RELEASE ORAL
Qty: 60 TABLET | Refills: 0 | Status: ON HOLD | OUTPATIENT
Start: 2020-11-17 | End: 2020-12-29

## 2020-11-17 RX ORDER — CEPHALEXIN 500 MG/1
500 CAPSULE ORAL 2 TIMES DAILY
Qty: 14 CAPSULE | Refills: 0 | Status: SHIPPED | OUTPATIENT
Start: 2020-11-17 | End: 2020-11-24

## 2020-11-17 RX ORDER — M-VIT,TX,IRON,MINS/CALC/FOLIC 27MG-0.4MG
1 TABLET ORAL DAILY
Status: ON HOLD | COMMUNITY
End: 2020-12-29

## 2020-11-17 RX ORDER — ONDANSETRON 2 MG/ML
4 INJECTION INTRAMUSCULAR; INTRAVENOUS ONCE
Status: COMPLETED | OUTPATIENT
Start: 2020-11-17 | End: 2020-11-17

## 2020-11-17 RX ADMIN — SODIUM CHLORIDE 1000 ML: 9 INJECTION, SOLUTION INTRAVENOUS at 11:31

## 2020-11-17 RX ADMIN — ONDANSETRON HYDROCHLORIDE 4 MG: 2 INJECTION, SOLUTION INTRAMUSCULAR; INTRAVENOUS at 11:31

## 2020-11-17 RX ADMIN — CEFTRIAXONE SODIUM 1 G: 1 INJECTION, POWDER, FOR SOLUTION INTRAMUSCULAR; INTRAVENOUS at 13:18

## 2020-11-17 NOTE — ED PROVIDER NOTES
Emergency Physician Note  [address] 11 [facility] ED  [address] [address]  Dept: [phone]  Loc: [phone]  Open Note Time:  10:49 AM EST    Chief Complaint  Emesis (Vomit for 12 weeks, gotten worse. She is 16 weeks pregnant. ) and Nausea (Feels nauseas constantly )       History of Present Illness  Josue Lipscomb Milder is a 31 y.o. female  has a past medical history of Anemia, Anxiety, Depression, History of recurrent UTI (urinary tract infection), Infections of kidney, Kidney stone, Miscarriage, Other disorders of kidney and ureter, and Status post cystoscopy. who presents to the ED for vomiting. Patient is currently 16 weeks pregnant, estimated due date 2021. She has had an ultrasound confirming an IUP. Patient is G8, P3. She has had a small amount of discharge that she says initially might have been yellow but now is white. She is also had lower back spasms for the past 3 days. She reports she has had nausea and vomiting so severe for the last 2 to 3 days that she has been unable to keep anything down including fluids. Patient states that she has been having nausea and vomiting for 12 to 16 weeks of this pregnancy. Patient had 4 miscarriages, all of them were her first term pregnancy. Patient recently switched from [address] [place] [address] to [facility] because she wants to do a  for this delivery. She reports she has been having more frequent migraines over the last several weeks however does not have a headache at this time. Denies hematuria, however reports dysuria and urinary frequency, patient states she frequently gets UTIs. Patient states she has tried small more frequent meals however the last 2 days she has been unable to keep anything down because everything she eats she immediately vomits back up. She has not taken anything other than over-the-counter medications in order to control her nausea and vomiting.     Denies fever, chills, malaise, chest pain, shortness of breath, cough, abdominal pain,  diarrhea, sore throat, rash. No palliative/provocative factors. Unless otherwise stated in this report or unable to obtain because of the patient's clinical or mental status as evidenced by the medical record, this patient's positive and negative responses for review of systems, constitutional, psych, eyes, ENT, cardiovascular, respiratory, gastrointestinal, neurological, genitourinary, musculoskeletal, integument systems and systems related to the presenting problem are either stated in the preceding paragraph or were not pertinent or were negative for the symptoms and/or complaints related to the medical problem. I have reviewed the following from the nursing documentation:      Prior to Admission medications    Medication Sig Start Date End Date Taking?  Authorizing Provider   Multiple Vitamins-Minerals (THERAPEUTIC MULTIVITAMIN-MINERALS) tablet Take 1 tablet by mouth daily   Yes Historical Provider, MD   traZODone (DESYREL) 100 MG tablet TAKE 1 TABLET BY MOUTH NIGHTLY AS NEEDED FOR SLEEP  Patient not taking: Reported on 2020   Diane Ortiz MD       Allergies as of 2020 - Review Complete 2020   Allergen Reaction Noted    Hydromorphone hcl Anaphylaxis and Shortness Of Breath 2015    Metoclopramide  2020    Dilaudid [hydromorphone hcl] Rash 2011       Past Medical History:   Diagnosis Date    Anemia     Anxiety     Depression     History of recurrent UTI (urinary tract infection)     Infections of kidney     Kidney stone 2011    Miscarriage     Other disorders of kidney and ureter     2 ureters on R side    Status post cystoscopy 11        Surgical History:   Past Surgical History:   Procedure Laterality Date     SECTION  2014    X 2;  &     [facility]STOS[facility]  [id number]    [facility]    EYE SURGERY      EYE SURGERY          Family History:    Family Circumference       Peak Flow       Pain Score       Pain Loc       Pain Edu? Excl. in [address]? GENERAL:   Body mass index is 26.22 kg/m². Awake, alert. Well developed, well nourished with no apparent distress. Nontoxic-appearing, non-ill-appearing. HENT:   Normocephalic, Atraumatic, no lacerations. No ENT exam due to PPE. EYES:   Conjunctiva normal,   Pupils equal round and reactive to light,   Extraocular movements normal.  NECK:  Trachea is midline. No stridor. CHEST:  Regular rate and regular rhythm, no murmurs/rubs/gallops,   normal S1/S2, chest wall non-tender. LUNGS:  No respiratory distress. No abdominal retractions, no sternal retractions. Clear to auscultation bilaterally, no wheezing, no rhochi, no rales  Speaking comfortably in full sentences  ABDOMEN:  Soft, non-tender, non-distended. No guarding. No rebound. No costovertebral angle tenderness to palpation. Normal BS, no organomegaly, no abdominal masses  EXTREMITIES:  Moves extremities x4 with purpose. Normal range of motion, no edema,   No tenderness, no deformity,   distal pulses present and equal bilaterally. BACK:  No midline tenderness in the cervical, thoracic, and lumbar spine. No deformities, no step-off. Palpation did elicit left lumbar paraspinous tenderness. SKIN:  Warm, dry and intact. NEUROLOGIC:  Normal mental status. Moving all extremities to command. Alert and oriented x4   without focal motor deficit or gross sensory deficit. Normal speech.     PSYCHIATRIC:  Not anxious,   normal mood and affect,   thoughts are linear and organized,   without delusions/hallucinations,   Not responding to internal stimuli,  responds appropriately to questions    LABS and DIAGNOSTIC RESULTS    LABS  Results for orders placed or performed during the hospital encounter of 11/17/20   CBC Auto Differential   Result Value Ref Range    WBC 7.2 4.0 - 11.0 K/uL    RBC 4.30 4.00 - 5.20 M/uL Hemoglobin 12.2 12.0 - 16.0 g/dL    Hematocrit 36.8 36.0 - 48.0 %    MCV 85.6 80.0 - 100.0 fL    MCH 28.4 26.0 - 34.0 pg    MCHC 33.2 31.0 - 36.0 g/dL    RDW 13.5 12.4 - 15.4 %    Platelets 950 800 - 375 K/uL    MPV 7.7 5.0 - 10.5 fL    Neutrophils % 65.8 %    Lymphocytes % 28.9 %    Monocytes % 4.8 %    Eosinophils % 0.4 %    Basophils % 0.1 %    Neutrophils Absolute 4.8 1.7 - 7.7 K/uL    Lymphocytes Absolute 2.1 1.0 - 5.1 K/uL    Monocytes Absolute 0.3 0.0 - 1.3 K/uL    Eosinophils Absolute 0.0 0.0 - 0.6 K/uL    Basophils Absolute 0.0 0.0 - 0.2 K/uL   Comprehensive Metabolic Panel w/ Reflex to MG   Result Value Ref Range    Sodium 135 (L) 136 - 145 mmol/L    Potassium reflex Magnesium 3.7 3.5 - 5.1 mmol/L    Chloride 102 99 - 110 mmol/L    CO2 23 21 - 32 mmol/L    Anion Gap 10 3 - 16    Glucose 81 70 - 99 mg/dL    BUN 6 (L) 7 - 20 mg/dL    CREATININE <0.5 (L) 0.6 - 1.1 mg/dL    GFR Non-African American >60 >60    GFR African American >60 >60    Calcium 9.0 8.3 - 10.6 mg/dL    Total Protein 7.8 6.4 - 8.2 g/dL    Alb 4.3 3.4 - 5.0 g/dL    Albumin/Globulin Ratio 1.2 1.1 - 2.2    Total Bilirubin <0.2 0.0 - 1.0 mg/dL    Alkaline Phosphatase 52 40 - 129 U/L    ALT 12 10 - 40 U/L    AST 18 15 - 37 U/L    Globulin 3.5 g/dL   Urinalysis, reflex to microscopic   Result Value Ref Range    Color, UA Yellow Straw/Yellow    Clarity, UA SL CLOUDY (A) Clear    Glucose, Ur Negative Negative mg/dL    Bilirubin Urine Negative Negative    Ketones, Urine 15 (A) Negative mg/dL    Specific Gravity, UA 1.020 1.005 - 1.030    Blood, Urine Negative Negative    pH, UA 7.0 5.0 - 8.0    Protein, UA Negative Negative mg/dL    Urobilinogen, Urine 0.2 <2.0 E.U./dL    Nitrite, Urine Negative Negative    Leukocyte Esterase, Urine MODERATE (A) Negative    Microscopic Examination YES     Urine Type see below    Microscopic Urinalysis   Result Value Ref Range    Mucus, UA 3+ (A) None Seen /LPF    WBC, UA 10-20 (A) 0 - 5 /HPF    RBC, UA 11-20 (A) 0 - 4 /HPF    Epithelial Cells, UA 21-50 (A) 0 - 5 /HPF    Bacteria, UA 3+ (A) None Seen /HPF       PROCEDURES  ULTRASOUND - OB  Ultrasound probe was used to evaluate the fetus. There is positive fetal movement, anterior placenta, positive  bpm. Patient tolerated the procedure well. MEDICAL DECISION MAKING    Procedures/interventions/images ordered for this visit  Orders Placed This Encounter   Procedures    CBC Auto Differential    Comprehensive Metabolic Panel w/ Reflex to MG    Urinalysis, reflex to microscopic    Saline lock IV    Insert peripheral IV       Medications ordered for this visit  Orders Placed This Encounter   Medications    0.9 % sodium chloride IV bolus 1,000 mL       ED course notes for this visit       I wore  surgical mask, and gloves when I evaluated the patient. I evaluated the patient in room 14/14      Spoke with Dr. Keren Anne, obstetrician at her recently fired obstetrician office. He informed me that the patient has fired them. However, since she has not been established at another obstetrician's office I felt that it was appropriate that they would abide me on her visit to the ER. He recommended Rocephin while she is in the ER to treat the UTI and that she can be discharged. Pt presents with vomiting. Abdominal exam is benign. After anti-emetic, patient was reassessed and has been tolerating PO challenge without difficulty. Patient has a benign abdominal exam. There is no significant evidence of severe dehydration, surgical abdominal process, severe electrolyte abnormality, toxicity, shock, sepsis, hemodynamic or cardiopulmonary instability, increased intracranial pressure, or any disease process requiring other immediate surgical or further ER medical intervention at this time. Patient understands she will be treated for UTI. Patient informed me that she never got a prescription for the Diclegis from her most recent obstetrician appointment.   It is understood that if the patient is not improving as expected or if other new symptoms or signs of concern develop, other etiologies or diagnoses may need to be considered requiring other tests, treatments, consultations, and/or admission. The diagnosis, plan, expected course, follow-up, and return precautions were discussed and all questions were answered. Final Impression    1. Nausea and vomiting, intractability of vomiting not specified, unspecified vomiting type    2. Dehydration    3. Urinary tract infection without hematuria, site unspecified    4. Second trimester pregnancy        Blood pressure 97/62, pulse 72, temperature 98.2 °F (36.8 °C), temperature source Oral, resp. rate 18, height 5' 3\" (1.6 m), weight 148 lb (67.1 kg), last menstrual period 07/23/2020, SpO2 100 %, not currently breastfeeding. Disposition  At this point I do not feel the patient requires further work up and it is reasonable to discharge the patient. I had a discussion with the patient and/or their surrogate regarding diagnosis, diagnostic testing results, treatment/ plan of care, and follow up. Patient and/or companions verbalized understanding of the ED workup, any relevant findings as well as any incidental findings, and the disposition and plan. There was shared decision-making between myself as well as the patient and/or their surrogate and we are all in agreement with discharge home. There was an opportunity for questions and all questions were answered to the best of my ability and to the satisfaction of the patient and/or patient family. Patient agreed to follow upas recommend for further evaluation/treatment. The patient was given strict return precautions as we discussed symptoms that would necessitate return to the ED. Patient will return to ED for new/worsening symptoms. The patient verbalized their understanding and agreement with the above plan.   Please refer to AVS for further details regarding discharge instructions. Patient was given scripts for the following medications. I counseled patient how to take these medications. Discharge Medication List as of 11/17/2020  2:28 PM      START taking these medications    Details   cephALEXin (KEFLEX) 500 MG capsule Take 1 capsule by mouth 2 times daily for 7 days, Disp-14 capsule,R-0Print      doxylamine-pyridoxine 10-10 MG TBEC Two tablets at bedtime on day 1 and 2; if symptoms persist, take 1 tablet in morning and 2 tablets at bedtime on day 3; if symptoms persist, may increase to 1 tablet in morning, 1 tablet mid-afternoon, and 2 tablets at bedtime on day 4 (maximum: doxylami ne 40 mg/pyridoxine 40 mg (4 tablets) per day), Disp-60 tablet,R-0Print             Patient had scripts modified or refilled for the following medications. I counseled patient how to take these medications. Discharge Medication List as of 11/17/2020  2:28 PM          Pt is in stable condition upon Discharge to home. The note was completed using Dragon voice recognition transcription. Every effort was made to ensure accuracy; however, inadvertent transcription errors may be present despite my best efforts to edit errors.     Naty Phipps MD  [address] [place] [address] MD Rajinder  11/17/20 5034

## 2020-11-17 NOTE — ED NOTES
Norberto Simental [facility]'s office for call back of her or on call doctor. Mata Alert  11/17/20 1146    1153 - Dr. Eliu Fish called back.       Mata Alert  11/17/20 1201

## 2020-11-18 LAB — URINE CULTURE, ROUTINE: NORMAL

## 2020-12-21 ENCOUNTER — HOSPITAL ENCOUNTER (OUTPATIENT)
Dept: ULTRASOUND IMAGING | Age: 30
Discharge: HOME | End: 2020-12-21
Payer: MEDICAID

## 2020-12-21 PROCEDURE — 76805 OB US >/= 14 WKS SNGL FETUS: CPT

## 2020-12-29 ENCOUNTER — APPOINTMENT (OUTPATIENT)
Dept: ULTRASOUND IMAGING | Age: 30
End: 2020-12-29
Payer: MEDICAID

## 2020-12-29 ENCOUNTER — HOSPITAL ENCOUNTER (OUTPATIENT)
Age: 30
Discharge: HOME | End: 2020-12-29
Attending: OBSTETRICS & GYNECOLOGY | Admitting: OBSTETRICS & GYNECOLOGY
Payer: MEDICAID

## 2020-12-29 VITALS
HEART RATE: 86 BPM | TEMPERATURE: 98.7 F | RESPIRATION RATE: 16 BRPM | DIASTOLIC BLOOD PRESSURE: 71 MMHG | SYSTOLIC BLOOD PRESSURE: 102 MMHG

## 2020-12-29 PROBLEM — R10.9 FLANK PAIN IN PREGNANT PATIENT: Status: ACTIVE | Noted: 2020-12-29

## 2020-12-29 PROBLEM — O26.899 FLANK PAIN IN PREGNANT PATIENT: Status: ACTIVE | Noted: 2020-12-29

## 2020-12-29 LAB
BACTERIA: ABNORMAL /HPF
BILIRUBIN URINE: ABNORMAL
BLOOD, URINE: NEGATIVE
CLARITY: ABNORMAL
COLOR: YELLOW
CRYSTALS, UA: ABNORMAL /HPF
EPITHELIAL CELLS, UA: 7 /HPF (ref 0–5)
GLUCOSE URINE: 100 MG/DL
HYALINE CASTS: 9 /LPF (ref 0–8)
KETONES, URINE: 15 MG/DL
LEUKOCYTE ESTERASE, URINE: ABNORMAL
MICROSCOPIC EXAMINATION: YES
NITRITE, URINE: NEGATIVE
PH UA: 6 (ref 5–8)
PROTEIN UA: NEGATIVE MG/DL
RBC UA: 2 /HPF (ref 0–4)
SPECIFIC GRAVITY UA: 1.02 (ref 1–1.03)
URINE TYPE: ABNORMAL
UROBILINOGEN, URINE: 1 E.U./DL
WBC UA: 58 /HPF (ref 0–5)

## 2020-12-29 PROCEDURE — 81001 URINALYSIS AUTO W/SCOPE: CPT

## 2020-12-29 PROCEDURE — 87086 URINE CULTURE/COLONY COUNT: CPT

## 2020-12-29 PROCEDURE — 76770 US EXAM ABDO BACK WALL COMP: CPT

## 2020-12-29 PROCEDURE — 99211 OFF/OP EST MAY X REQ PHY/QHP: CPT

## 2020-12-29 RX ORDER — ONDANSETRON HYDROCHLORIDE 8 MG/1
8 TABLET, FILM COATED ORAL PRN
COMMUNITY
Start: 2020-12-23 | End: 2021-07-08

## 2020-12-29 NOTE — FLOWSHEET NOTE
Received pt at triage with complaints of having a kidney stone. States she had one with previous pregnancy and had the same sensations last night as before. Extreme pain right flank area with radiation down toward leg. Chills with no fever. Sweating and shivering at the same time. Pain eventually subsided and is a dull ache at this time. Called MD who wanted pt to be checked for uti. Urine sample sent to lab. Pt reports feeling fetal movement.

## 2020-12-30 NOTE — FLOWSHEET NOTE
Pt discharged to home ambulating undelivered on 12/29/20  at 1935 . Labor precautions on discharge instructions reviewed with pt. Copy given to patient. Pt denies needs or concerns and verbalized understanding to keep currently scheduled OB appointment and to contact her OB with any questions or concerns she may have prior to her next appointment or during the remainder of her pregnancy.

## 2020-12-31 LAB — URINE CULTURE, ROUTINE: NORMAL

## 2021-01-21 LAB
ANION GAP SERPL CALCULATED.3IONS-SCNC: 16.5 MMOL/L (ref 8–16)
BASOPHILS RELATIVE PERCENT: 0.2 % (ref 0–1)
BUN BLDV-MCNC: 8 MG/DL (ref 5–19)
CALCIUM SERPL-MCNC: 8.5 MG/DL (ref 8.4–10.2)
CHLORIDE BLD-SCNC: 103 MMOL/L (ref 99–111)
CO2: 23 MMOL/L (ref 21–33)
CREAT SERPL-MCNC: 0.5 MG/DL (ref 0.6–1.3)
EOSINOPHILS RELATIVE PERCENT: 0.5 % (ref 0–5)
ERYTHROCYTE DISTRIBUTION WIDTH RBC RATIO: 13.8 % (ref 11.6–14.8)
GFR CALCULATED: 147
GLUCOSE BLD-MCNC: 73 MG/DL (ref 74–99)
GRANULOCYTE ABSOLUTE COUNT: 4.4 X(10)3/UL (ref 1.8–7.2)
HCT VFR BLD CALC: 33.6 % (ref 35.7–46.7)
HEMOGLOBIN: 10.8 G/DL (ref 11.9–15.9)
IMMATURE GRANULOCYTES %: 0.5 % (ref 0–0.5)
LYMPHOCYTES ABSOLUTE: 1.7 X(10)3/UL (ref 1.1–2.7)
LYMPHOCYTES RELATIVE PERCENT: 26.5 % (ref 15–45)
MCH RBC QN AUTO: 28.1 PG (ref 28.1–33.6)
MCHC RBC AUTO-ENTMCNC: 32.1 G/DL (ref 32.8–34.7)
MCV RBC AUTO: 87.5 FL (ref 82.9–99.9)
MONOCYTES RELATIVE PERCENT: 5.1 % (ref 0–12)
NEUTROPHILS/100 LEUKOCYTES: 67.2 % (ref 40–70)
PLATELETS: 273 X1000 (ref 175–420)
POTASSIUM SERPL-SCNC: 3.8 MMOL/L (ref 3.4–5)
RBC # BLD: 3.84 X1000000 (ref 3.72–5.31)
SODIUM BLD-SCNC: 139 MMOL/L (ref 136–146)
WBC # BLD: 6.5 X1000 (ref 4.5–10.3)

## 2021-03-16 ENCOUNTER — HOSPITAL ENCOUNTER (OUTPATIENT)
Age: 31
Discharge: HOME | End: 2021-03-16
Attending: OBSTETRICS & GYNECOLOGY | Admitting: OBSTETRICS & GYNECOLOGY
Payer: MEDICAID

## 2021-03-16 VITALS
DIASTOLIC BLOOD PRESSURE: 61 MMHG | RESPIRATION RATE: 16 BRPM | SYSTOLIC BLOOD PRESSURE: 99 MMHG | HEART RATE: 86 BPM | TEMPERATURE: 98.8 F

## 2021-03-16 DIAGNOSIS — O16.9 HYPERTENSION AFFECTING PREGNANCY, ANTEPARTUM: Primary | ICD-10-CM

## 2021-03-16 LAB
A/G RATIO: 1.1 (ref 1.1–2.2)
ALBUMIN SERPL-MCNC: 3.4 G/DL (ref 3.4–5)
ALP BLD-CCNC: 121 U/L (ref 40–129)
ALT SERPL-CCNC: 8 U/L (ref 10–40)
ANION GAP SERPL CALCULATED.3IONS-SCNC: 11 MMOL/L (ref 3–16)
AST SERPL-CCNC: 15 U/L (ref 15–37)
BACTERIA: ABNORMAL /HPF
BASOPHILS ABSOLUTE: 0 K/UL (ref 0–0.2)
BASOPHILS RELATIVE PERCENT: 0.2 %
BILIRUB SERPL-MCNC: <0.2 MG/DL (ref 0–1)
BILIRUBIN URINE: NEGATIVE
BLOOD, URINE: NEGATIVE
BUN BLDV-MCNC: 6 MG/DL (ref 7–20)
CALCIUM SERPL-MCNC: 8.3 MG/DL (ref 8.3–10.6)
CHLORIDE BLD-SCNC: 105 MMOL/L (ref 99–110)
CLARITY: ABNORMAL
CO2: 19 MMOL/L (ref 21–32)
COLOR: YELLOW
CREAT SERPL-MCNC: <0.5 MG/DL (ref 0.6–1.1)
CREATININE URINE: 154.4 MG/DL (ref 28–259)
EOSINOPHILS ABSOLUTE: 0 K/UL (ref 0–0.6)
EOSINOPHILS RELATIVE PERCENT: 0.4 %
EPITHELIAL CELLS, UA: 19 /HPF (ref 0–5)
GFR AFRICAN AMERICAN: >60
GFR NON-AFRICAN AMERICAN: >60
GLOBULIN: 3 G/DL
GLUCOSE BLD-MCNC: 119 MG/DL (ref 70–99)
GLUCOSE URINE: NEGATIVE MG/DL
HCT VFR BLD CALC: 31 % (ref 36–48)
HEMOGLOBIN: 10.1 G/DL (ref 12–16)
KETONES, URINE: NEGATIVE MG/DL
LEUKOCYTE ESTERASE, URINE: ABNORMAL
LYMPHOCYTES ABSOLUTE: 1.9 K/UL (ref 1–5.1)
LYMPHOCYTES RELATIVE PERCENT: 21.1 %
MCH RBC QN AUTO: 27.5 PG (ref 26–34)
MCHC RBC AUTO-ENTMCNC: 32.4 G/DL (ref 31–36)
MCV RBC AUTO: 85 FL (ref 80–100)
MICROSCOPIC EXAMINATION: YES
MONOCYTES ABSOLUTE: 0.4 K/UL (ref 0–1.3)
MONOCYTES RELATIVE PERCENT: 4.4 %
NEUTROPHILS ABSOLUTE: 6.7 K/UL (ref 1.7–7.7)
NEUTROPHILS RELATIVE PERCENT: 73.9 %
NITRITE, URINE: NEGATIVE
PDW BLD-RTO: 13.8 % (ref 12.4–15.4)
PH UA: 6.5 (ref 5–8)
PLATELET # BLD: 301 K/UL (ref 135–450)
PMV BLD AUTO: 8.2 FL (ref 5–10.5)
POTASSIUM SERPL-SCNC: 3.7 MMOL/L (ref 3.5–5.1)
PROTEIN PROTEIN: 26 MG/DL
PROTEIN UA: ABNORMAL MG/DL
PROTEIN/CREAT RATIO: 0.2 MG/DL
RBC # BLD: 3.65 M/UL (ref 4–5.2)
RBC UA: 2 /HPF (ref 0–4)
SODIUM BLD-SCNC: 135 MMOL/L (ref 136–145)
SPECIFIC GRAVITY UA: 1.02 (ref 1–1.03)
TOTAL PROTEIN: 6.4 G/DL (ref 6.4–8.2)
URIC ACID, SERUM: 3.2 MG/DL (ref 2.6–6)
URINE TYPE: ABNORMAL
UROBILINOGEN, URINE: 1 E.U./DL
WBC # BLD: 9.1 K/UL (ref 4–11)
WBC UA: 125 /HPF (ref 0–5)

## 2021-03-16 PROCEDURE — 80053 COMPREHEN METABOLIC PANEL: CPT

## 2021-03-16 PROCEDURE — 82570 ASSAY OF URINE CREATININE: CPT

## 2021-03-16 PROCEDURE — 81001 URINALYSIS AUTO W/SCOPE: CPT

## 2021-03-16 PROCEDURE — 99211 OFF/OP EST MAY X REQ PHY/QHP: CPT

## 2021-03-16 PROCEDURE — 85025 COMPLETE CBC W/AUTO DIFF WBC: CPT

## 2021-03-16 PROCEDURE — 59025 FETAL NON-STRESS TEST: CPT

## 2021-03-16 PROCEDURE — 84550 ASSAY OF BLOOD/URIC ACID: CPT

## 2021-03-16 PROCEDURE — 84156 ASSAY OF PROTEIN URINE: CPT

## 2021-03-16 NOTE — FLOWSHEET NOTE
Dr Akanksha Rodney notified of pt lab results. Order for discharge obtained. MD wants pt to perform 24 hour urine as outpatient.

## 2021-03-16 NOTE — FLOWSHEET NOTE
Pt given discharge instructions and verbalized understanding. She was given supplies to perform 24 hour urine. States she has OB MD appointment Friday. She reports feeling fetal movement. She is discharged undelivered and ambulatory.

## 2021-03-16 NOTE — FLOWSHEET NOTE
Received pt with complaints of feeling sweaty and hot. Has had headache. Checked BP at home and it was elevated. Pt states she is just not feeling well. Dr Emelina Bajwa called to check on pt at [phone] and was given update. Fetal monitoring is reassuring. accels noted, moderate variability, no decels.

## 2021-04-10 LAB — GBS, EXTERNAL RESULT: NEGATIVE

## 2021-04-29 ENCOUNTER — ANESTHESIA EVENT (OUTPATIENT)
Dept: LABOR AND DELIVERY | Age: 31
DRG: 560 | End: 2021-04-29
Payer: MEDICAID

## 2021-04-29 ENCOUNTER — HOSPITAL ENCOUNTER (INPATIENT)
Age: 31
LOS: 2 days | Discharge: HOME HEALTH | DRG: 560 | End: 2021-05-01
Attending: OBSTETRICS & GYNECOLOGY | Admitting: OBSTETRICS & GYNECOLOGY
Payer: MEDICAID

## 2021-04-29 ENCOUNTER — ANESTHESIA (OUTPATIENT)
Dept: LABOR AND DELIVERY | Age: 31
DRG: 560 | End: 2021-04-29
Payer: MEDICAID

## 2021-04-29 PROBLEM — Z34.90 ENCOUNTER FOR PLANNED INDUCTION OF LABOR: Status: ACTIVE | Noted: 2021-04-29

## 2021-04-29 LAB
ABO/RH: NORMAL
AMPHETAMINE SCREEN, URINE: ABNORMAL
ANTIBODY SCREEN: NORMAL
BARBITURATE SCREEN URINE: ABNORMAL
BENZODIAZEPINE SCREEN, URINE: ABNORMAL
BUPRENORPHINE URINE: ABNORMAL
CANNABINOID SCREEN URINE: POSITIVE
COCAINE METABOLITE SCREEN URINE: ABNORMAL
HCT VFR BLD CALC: 29.2 % (ref 36–48)
HEMOGLOBIN: 9.4 G/DL (ref 12–16)
Lab: ABNORMAL
MCH RBC QN AUTO: 26.5 PG (ref 26–34)
MCHC RBC AUTO-ENTMCNC: 32.2 G/DL (ref 31–36)
MCV RBC AUTO: 82.3 FL (ref 80–100)
METHADONE SCREEN, URINE: ABNORMAL
OPIATE SCREEN URINE: ABNORMAL
OXYCODONE URINE: ABNORMAL
PDW BLD-RTO: 14.7 % (ref 12.4–15.4)
PH UA: 6
PHENCYCLIDINE SCREEN URINE: ABNORMAL
PLATELET # BLD: 222 K/UL (ref 135–450)
PMV BLD AUTO: 8.6 FL (ref 5–10.5)
PROPOXYPHENE SCREEN: ABNORMAL
RBC # BLD: 3.54 M/UL (ref 4–5.2)
SARS-COV-2, NAAT: NOT DETECTED
TOTAL SYPHILLIS IGG/IGM: NORMAL
WBC # BLD: 7.7 K/UL (ref 4–11)

## 2021-04-29 PROCEDURE — 7200000001 HC VAGINAL DELIVERY

## 2021-04-29 PROCEDURE — 6360000002 HC RX W HCPCS: Performed by: OBSTETRICS & GYNECOLOGY

## 2021-04-29 PROCEDURE — 86850 RBC ANTIBODY SCREEN: CPT

## 2021-04-29 PROCEDURE — 10907ZC DRAINAGE OF AMNIOTIC FLUID, THERAPEUTIC FROM PRODUCTS OF CONCEPTION, VIA NATURAL OR ARTIFICIAL OPENING: ICD-10-PCS | Performed by: OBSTETRICS & GYNECOLOGY

## 2021-04-29 PROCEDURE — 87635 SARS-COV-2 COVID-19 AMP PRB: CPT

## 2021-04-29 PROCEDURE — 80307 DRUG TEST PRSMV CHEM ANLYZR: CPT

## 2021-04-29 PROCEDURE — 2500000003 HC RX 250 WO HCPCS: Performed by: NURSE ANESTHETIST, CERTIFIED REGISTERED

## 2021-04-29 PROCEDURE — 6370000000 HC RX 637 (ALT 250 FOR IP): Performed by: OBSTETRICS & GYNECOLOGY

## 2021-04-29 PROCEDURE — 2580000003 HC RX 258: Performed by: OBSTETRICS & GYNECOLOGY

## 2021-04-29 PROCEDURE — 3E033VJ INTRODUCTION OF OTHER HORMONE INTO PERIPHERAL VEIN, PERCUTANEOUS APPROACH: ICD-10-PCS | Performed by: OBSTETRICS & GYNECOLOGY

## 2021-04-29 PROCEDURE — 3700000025 EPIDURAL BLOCK: Performed by: ANESTHESIOLOGY

## 2021-04-29 PROCEDURE — 86901 BLOOD TYPING SEROLOGIC RH(D): CPT

## 2021-04-29 PROCEDURE — 86780 TREPONEMA PALLIDUM: CPT

## 2021-04-29 PROCEDURE — 86900 BLOOD TYPING SEROLOGIC ABO: CPT

## 2021-04-29 PROCEDURE — 85027 COMPLETE CBC AUTOMATED: CPT

## 2021-04-29 PROCEDURE — 0HQ9XZZ REPAIR PERINEUM SKIN, EXTERNAL APPROACH: ICD-10-PCS | Performed by: OBSTETRICS & GYNECOLOGY

## 2021-04-29 PROCEDURE — 1200000000 HC SEMI PRIVATE

## 2021-04-29 RX ORDER — SODIUM CHLORIDE 0.9 % (FLUSH) 0.9 %
10 SYRINGE (ML) INJECTION PRN
Status: DISCONTINUED | OUTPATIENT
Start: 2021-04-29 | End: 2021-04-29

## 2021-04-29 RX ORDER — DOCUSATE SODIUM 100 MG/1
100 CAPSULE, LIQUID FILLED ORAL 2 TIMES DAILY
Status: DISCONTINUED | OUTPATIENT
Start: 2021-04-29 | End: 2021-04-29

## 2021-04-29 RX ORDER — SODIUM CHLORIDE, SODIUM LACTATE, POTASSIUM CHLORIDE, CALCIUM CHLORIDE 600; 310; 30; 20 MG/100ML; MG/100ML; MG/100ML; MG/100ML
INJECTION, SOLUTION INTRAVENOUS CONTINUOUS
Status: DISCONTINUED | OUTPATIENT
Start: 2021-04-29 | End: 2021-04-29

## 2021-04-29 RX ORDER — LIDOCAINE HYDROCHLORIDE 10 MG/ML
30 INJECTION, SOLUTION EPIDURAL; INFILTRATION; INTRACAUDAL; PERINEURAL PRN
Status: DISCONTINUED | OUTPATIENT
Start: 2021-04-29 | End: 2021-04-29

## 2021-04-29 RX ORDER — DOCUSATE SODIUM 100 MG/1
100 CAPSULE, LIQUID FILLED ORAL 2 TIMES DAILY
Status: DISCONTINUED | OUTPATIENT
Start: 2021-04-29 | End: 2021-05-01 | Stop reason: HOSPADM

## 2021-04-29 RX ORDER — ONDANSETRON 2 MG/ML
4 INJECTION INTRAMUSCULAR; INTRAVENOUS EVERY 6 HOURS PRN
Status: DISCONTINUED | OUTPATIENT
Start: 2021-04-29 | End: 2021-04-29

## 2021-04-29 RX ORDER — IBUPROFEN 600 MG/1
600 TABLET ORAL EVERY 8 HOURS PRN
Status: DISCONTINUED | OUTPATIENT
Start: 2021-04-29 | End: 2021-05-01 | Stop reason: HOSPADM

## 2021-04-29 RX ORDER — SODIUM CHLORIDE 0.9 % (FLUSH) 0.9 %
10 SYRINGE (ML) INJECTION EVERY 12 HOURS SCHEDULED
Status: DISCONTINUED | OUTPATIENT
Start: 2021-04-29 | End: 2021-04-29

## 2021-04-29 RX ORDER — SODIUM CHLORIDE 9 MG/ML
25 INJECTION, SOLUTION INTRAVENOUS PRN
Status: DISCONTINUED | OUTPATIENT
Start: 2021-04-29 | End: 2021-05-01 | Stop reason: HOSPADM

## 2021-04-29 RX ORDER — TERBUTALINE SULFATE 1 MG/ML
0.25 INJECTION, SOLUTION SUBCUTANEOUS ONCE
Status: DISCONTINUED | OUTPATIENT
Start: 2021-04-29 | End: 2021-04-29

## 2021-04-29 RX ORDER — SODIUM CHLORIDE 0.9 % (FLUSH) 0.9 %
10 SYRINGE (ML) INJECTION PRN
Status: DISCONTINUED | OUTPATIENT
Start: 2021-04-29 | End: 2021-05-01 | Stop reason: HOSPADM

## 2021-04-29 RX ORDER — SODIUM CHLORIDE, SODIUM LACTATE, POTASSIUM CHLORIDE, CALCIUM CHLORIDE 600; 310; 30; 20 MG/100ML; MG/100ML; MG/100ML; MG/100ML
INJECTION, SOLUTION INTRAVENOUS CONTINUOUS
Status: DISCONTINUED | OUTPATIENT
Start: 2021-04-29 | End: 2021-05-01 | Stop reason: HOSPADM

## 2021-04-29 RX ORDER — FENTANYL/BUPIVACAINE/NS/PF 2-1250MCG
PLASTIC BAG, INJECTION (ML) INJECTION
Status: COMPLETED
Start: 2021-04-29 | End: 2021-04-29

## 2021-04-29 RX ORDER — MODIFIED LANOLIN
OINTMENT (GRAM) TOPICAL PRN
Status: DISCONTINUED | OUTPATIENT
Start: 2021-04-29 | End: 2021-05-01 | Stop reason: HOSPADM

## 2021-04-29 RX ORDER — FENTANYL/BUPIVACAINE/NS/PF 2-1250MCG
12 PLASTIC BAG, INJECTION (ML) INJECTION CONTINUOUS
Status: DISCONTINUED | OUTPATIENT
Start: 2021-04-29 | End: 2021-04-29

## 2021-04-29 RX ORDER — SODIUM CHLORIDE 9 MG/ML
25 INJECTION, SOLUTION INTRAVENOUS PRN
Status: DISCONTINUED | OUTPATIENT
Start: 2021-04-29 | End: 2021-04-29

## 2021-04-29 RX ORDER — LIDOCAINE HYDROCHLORIDE 15 MG/ML
INJECTION, SOLUTION EPIDURAL; INFILTRATION; INTRACAUDAL; PERINEURAL PRN
Status: DISCONTINUED | OUTPATIENT
Start: 2021-04-29 | End: 2021-04-29 | Stop reason: SDUPTHER

## 2021-04-29 RX ORDER — ACETAMINOPHEN 325 MG/1
650 TABLET ORAL EVERY 4 HOURS PRN
Status: DISCONTINUED | OUTPATIENT
Start: 2021-04-29 | End: 2021-05-01 | Stop reason: HOSPADM

## 2021-04-29 RX ORDER — SODIUM CHLORIDE 0.9 % (FLUSH) 0.9 %
10 SYRINGE (ML) INJECTION EVERY 12 HOURS SCHEDULED
Status: DISCONTINUED | OUTPATIENT
Start: 2021-04-29 | End: 2021-05-01 | Stop reason: HOSPADM

## 2021-04-29 RX ADMIN — ONDANSETRON 4 MG: 2 INJECTION INTRAMUSCULAR; INTRAVENOUS at 11:09

## 2021-04-29 RX ADMIN — LIDOCAINE HYDROCHLORIDE 3 ML: 15 INJECTION, SOLUTION EPIDURAL; INFILTRATION; INTRACAUDAL; PERINEURAL at 15:05

## 2021-04-29 RX ADMIN — SODIUM CHLORIDE, POTASSIUM CHLORIDE, SODIUM LACTATE AND CALCIUM CHLORIDE: 600; 310; 30; 20 INJECTION, SOLUTION INTRAVENOUS at 18:49

## 2021-04-29 RX ADMIN — SODIUM CHLORIDE, POTASSIUM CHLORIDE, SODIUM LACTATE AND CALCIUM CHLORIDE: 600; 310; 30; 20 INJECTION, SOLUTION INTRAVENOUS at 15:22

## 2021-04-29 RX ADMIN — SODIUM CHLORIDE, POTASSIUM CHLORIDE, SODIUM LACTATE AND CALCIUM CHLORIDE: 600; 310; 30; 20 INJECTION, SOLUTION INTRAVENOUS at 10:48

## 2021-04-29 RX ADMIN — LIDOCAINE HYDROCHLORIDE 5 ML: 15 INJECTION, SOLUTION EPIDURAL; INFILTRATION; INTRACAUDAL; PERINEURAL at 18:01

## 2021-04-29 RX ADMIN — ONDANSETRON 4 MG: 2 INJECTION INTRAMUSCULAR; INTRAVENOUS at 18:29

## 2021-04-29 RX ADMIN — IBUPROFEN 600 MG: 600 TABLET ORAL at 20:51

## 2021-04-29 RX ADMIN — ACETAMINOPHEN 650 MG: 325 TABLET ORAL at 21:24

## 2021-04-29 RX ADMIN — LIDOCAINE HYDROCHLORIDE 5 ML: 15 INJECTION, SOLUTION EPIDURAL; INFILTRATION; INTRACAUDAL; PERINEURAL at 18:57

## 2021-04-29 RX ADMIN — Medication 1 MILLI-UNITS/MIN: at 07:06

## 2021-04-29 RX ADMIN — Medication 500 ML: at 19:54

## 2021-04-29 RX ADMIN — LIDOCAINE HYDROCHLORIDE 3 ML: 15 INJECTION, SOLUTION EPIDURAL; INFILTRATION; INTRACAUDAL; PERINEURAL at 15:02

## 2021-04-29 RX ADMIN — Medication 12 ML/HR: at 15:08

## 2021-04-29 RX ADMIN — SODIUM CHLORIDE, POTASSIUM CHLORIDE, SODIUM LACTATE AND CALCIUM CHLORIDE: 600; 310; 30; 20 INJECTION, SOLUTION INTRAVENOUS at 06:20

## 2021-04-29 NOTE — PROGRESS NOTES
Social Service Note:  SS Consult noted. Charge nurse states pt in labor and ok to see pt tomorrow morning. Nursing please notify pt  will coming for + Marijuana screen tomorrow morning.      Manjula EARL, [place]

## 2021-04-29 NOTE — FLOWSHEET NOTE
Dr. Carlos Jeffers updated via telephone including SVE, EFM showing variable, early, and non-recurring late decelerations; pt position changes; pt pain level and pt request for epidural redose. Ok for epidural redose given. Will continue to monitor and reposition.

## 2021-04-29 NOTE — ANESTHESIA PROCEDURE NOTES
Epidural Block    Patient location during procedure: OB  Start time: 2021 2:55 PM  Reason for block: labor epidural  Staffing  Performed: resident/CRNA   Anesthesiologist: Michelle Meneses MD  Resident/CRNA: TONJA Richardson CRNA  Preanesthetic Checklist  Completed: patient identified, IV checked, risks and benefits discussed, monitors and equipment checked, pre-op evaluation, timeout performed, anesthesia consent given, oxygen available and patient being monitored  Epidural  Patient position: sitting  Prep: ChloraPrep and x2  Patient monitoring: cardiac monitor, continuous pulse ox and frequent blood pressure checks  Approach: midline  Location: lumbar (1-5)  Injection technique: YULIET saline  Provider prep: mask and sterile gloves  Needle  Needle type: Tuohy   Needle gauge: 18 G  Needle length: 3.5 in  Needle insertion depth: 7 cm  Catheter type: side hole  Catheter size: 19 G (20 G)  Catheter at skin depth: 12 cm  Test dose: negative  Assessment  Sensory level: T10  Hemodynamics: stable  Attempts: 1  Additional Notes  Called for labor epidural analgesia request. Medical and Surgical history reviewed with pt. Risks/benefits of epidural discussed including allergic reaction, infection, bleeding, hypotension, headache, back pain, nerve damage, failed or one-sided block. Also discussed anesthesia options and associated risks in the event of . Questions answered. Verbalizes understanding and requests to proceed. FHR:  127  Pt in sitting position. Labor epidural placed using YULIET sterile technique (donned mask and sterile gloves). Back prepped with Chloraprepx2. Sterile drape applied. Site: L3-4  YULIET:   7 cm. Attempts:  1   Re-directs:  0  Site infiltrated with 1%Lidocaine. 17G Tuohy needle inserted, YULIET technique with saline. No heme, CSF, or paresthesias noted. Epidural space dilated with saline. #25ga pencan needle placed through tuohy for dural puncture. +CSF -heme or paresthesia. Spinal needle removed. Threaded epidural catheter through Tuohy needle easily. Tuohy needle withdrawn. Test Dose:1502           Negative aspiration. 3cc of 1.5% Lido with epi 1:200,000 test dose given. Negative test dose. Skin:  12cm catheter taped at the skin. Secured with steri strips, tegaderm, and tape. Infusion:  .15% Ropivacaine with Fentanyl (2ug/cc)  Auto bolus 4 ml every 20 minutes. (Max. Dose- 40 ml/hr.)      Sensory Level:  R:  T10 L:  T10    VAS: start 8/10, end 0/10    Patient in supine position with left uterine displacement.  VSS:

## 2021-04-29 NOTE — FLOWSHEET NOTE
Traci Kwon CRNA at bedside for epidural. Pt sitting at side of bed and pulse ox applied per protocol.

## 2021-04-29 NOTE — H&P
Obstetrics and Gynecology   Obstetrics History and Physical        CHIEF COMPLAINT:  Encounter for induction of labor at term    HISTORY OF PRESENT ILLNESS:      The patient is a 31 y.o. female at 39w0d. OB History        8    Para   3    Term   3       0    AB   4    Living   3       SAB   4    TAB   0    Ectopic   0    Molar        Multiple        Live Births   3            Patient presents with a chief complaint as above and is being admitted for induction. The patient does express only a modest desire for natural childbirth and she is consented for this choice at each office visit. She has been thoroughly consented for the option of TOLAC with the known successful last birth as a successful  with two prior  sections. She is not interested in a repeat  section, as she has said, at all costs. I again reviewed all risks versus benefits of all options for intervention at this time and she asks appropriate questions that are all answered to her satisfaction demonstrating her understanding of these and she elects to proceed. Estimated Due Date: Estimated Date of Delivery: 21    PRENATAL CARE:    Complicated by: prior  sections times two with successful  with last delivery.   Patient Active Problem List:     Adjustment disorder with mixed anxiety and depressed mood     Iliotibial band syndrome affecting lower leg, right     Patellofemoral pain syndrome of right knee     History of recurrent UTIs     Depressive disorder     Anxiety disorder, unspecified     Bereavement     Insomnia     Piriformis syndrome of left side     Flank pain in pregnant patient     Hypertension affecting pregnancy, antepartum     Encounter for planned induction of labor        PAST OB HISTORY:  OB History        8    Para   3    Term   3       0    AB   4    Living   3       SAB   4    TAB   0    Ectopic   0    Molar        Multiple        Live Births   3                Past Medical History:        Diagnosis Date    Anemia     Anxiety     Depression     no meds    History of recurrent UTI (urinary tract infection)     Infections of kidney     Kidney stone 2011    Miscarriage     Other disorders of kidney and ureter     2 ureters on R side    Status post cystoscopy 11     Past Surgical History:        Procedure Laterality Date     SECTION  2014    X 2;  & ,  2015    [facility]  [id number]    [facility]    EYE SURGERY      EYE SURGERY       Allergies:  Hydromorphone hcl, Sertraline, Metoclopramide, and Dilaudid [hydromorphone hcl]    Social History:    Social History     Socioeconomic History    Marital status:      Spouse name: Not on file    Number of children: Not on file    Years of education: Not on file    Highest education level: Not on file   Occupational History    Occupation: homemaker   Social Needs    Financial resource strain: Not on file    Food insecurity     Worry: Not on file     Inability: Not on file   [employer] needs     Medical: Not on file     Non-medical: Not on file   Tobacco Use    Smoking status: Never Smoker    Smokeless tobacco: Never Used   Substance and Sexual Activity    Alcohol use: No    Drug use: No    Sexual activity: Yes     Partners: Male   Lifestyle    Physical activity     Days per week: Not on file     Minutes per session: Not on file    Stress: Not on file   Relationships    Social connections     Talks on phone: Not on file     Gets together: Not on file     Attends Cheondoism service: Not on file     Active member of club or organization: Not on file     Attends meetings of clubs or organizations: Not on file     Relationship status: Not on file    Intimate partner violence     Fear of current or ex partner: Not on file     Emotionally abused: Not on file     Physically abused: Not on file     Forced sexual activity: Not on file   Other Topics Concern    Not on file   Social History Narrative    Not on file     Family History:       Problem Relation Age of Onset    Kidney Disease Mother     Crohn's Disease Father     Urolithiasis Father     Urolithiasis Sister     Diabetes Maternal Grandmother     Heart Disease Maternal Grandmother     Diabetes Paternal Grandmother     Diabetes Paternal Grandfather     Heart Disease Paternal Grandfather      Medications Prior to Admission:  Medications Prior to Admission: Prenatal MV & Min w/FA-DHA (PRENATAL ADULT GUMMY/DHA/FA) 0.4-25 MG CHEW, Take 1 tablet by mouth daily  ondansetron (ZOFRAN) 8 MG tablet, Take 8 mg by mouth as needed    REVIEW OF SYSTEMS:  Denies any of the following:  fever/chills and rash    PHYSICAL EXAM:  Vitals:    04/29/21 0909 04/29/21 0925 04/29/21 0955 04/29/21 1002   BP: 131/86   130/85   Pulse: 76   76   Resp: 16 16 16    Temp:       TempSrc:       Weight:       Height:         General appearance:  awake, alert, cooperative, no apparent distress, and appears stated age  Neurologic:  Awake, alert, oriented to name, place and time. Lungs:  No increased work of breathing, good air exchange  Abdomen:  Soft, non tender, gravid, consistent with her gestational age,   EFW:  by external exam was  appropriate for gestational age  Fetal heart rate:  Reassuring. FETAL SURVEILLANCE TESTING SUMMARY  INDICATIONS:  Early labor evaluation.   OBJECTIVE RESULTS:  Fetal heart variability: moderate  Fetal Heart Rate decelerations: none  Fetal Heart Rate accelerations: yes  Baseline FHR: 120 per minute  Uterine contractions: regular, every 3-5 minutes now on low dose of pitocin  Fetal surveillance: reassuring, Category 1  Pelvis:  Adequate pelvis  Fetal position: Cephalic    CERVIX: At admission in triage:    Dilation (cm): 2   Effacement (%): 60   Cervical Characteristics: Posterior   Station: -3        Membranes:  Intact    Labs:   CBC:   Lab Results   Component Value Date    WBC 7.7 04/29/2021    RBC 3.54 04/29/2021    HGB 9.4 04/29/2021    HCT 29.2 04/29/2021    MCV 82.3 04/29/2021    MCH 26.5 04/29/2021    MCHC 32.2 04/29/2021    RDW 14.7 04/29/2021     04/29/2021    MPV 8.6 04/29/2021       ASSESSMENT AND PLAN:    Labor: Admit, anticipate normal delivery, routine labor orders  Fetus: Reassuring  GBS: No  Other: Supportive care for this patient at term with desire for natural childbirth and with mom and baby doing well at this time.

## 2021-04-29 NOTE — ANESTHESIA PRE PROCEDURE
Department of Anesthesiology  Preprocedure Note       Name:  Olga Bishop   Age:  31 y.o.  :  1990                                          MRN:  [id number]         Date:  2021      Surgeon: * No surgeons listed *    Procedure: * No procedures listed *    Medications prior to admission:   Prior to Admission medications    Medication Sig Start Date End Date Taking? Authorizing Provider   Prenatal MV & Min w/FA-DHA (PRENATAL ADULT GUMMY/DHA/FA) 0.4-25 MG CHEW Take 1 tablet by mouth daily   Yes Historical Provider, MD   ondansetron (ZOFRAN) 8 MG tablet Take 8 mg by mouth as needed 20  Yes Historical Provider, MD       Current medications:    Current Facility-Administered Medications   Medication Dose Route Frequency Provider Last Rate Last Admin    lactated ringers infusion   Intravenous Continuous Mary Quinonez  mL/hr at 21 1128 Rate Verify at 21 1128    sodium chloride flush 0.9 % injection 10 mL  10 mL Intravenous 2 times per day Mary Quinonez MD        sodium chloride flush 0.9 % injection 10 mL  10 mL Intravenous PRN Mary Quinonez MD        0.9 % sodium chloride infusion  25 mL Intravenous PRN Mary Quinonez MD        oxytocin (PITOCIN) 10 unit bolus from the bag  10 Units Intravenous PRN Mary Quinonez MD        ondansetron [facility]) injection 4 mg  4 mg Intravenous Q6H PRN Mary Quinonez MD   4 mg at 21 1109    docusate sodium (COLACE) capsule 100 mg  100 mg Oral BID Mary Quinonez MD        lidocaine PF 1 % injection 30 mL  30 mL Other PRN Mary Quinonez MD        terbutaline (BRETHINE) injection 0.25 mg  0.25 mg Subcutaneous Once Mary Quinonez MD        oxytocin (PITOCIN) 30 units in 500 mL infusion  1-20 feng-units/min Intravenous Continuous Mary Quinonez MD 2 mL/hr at 21 1436 2 feng-units/min at 21 1436       Allergies:     Allergies   Allergen Reactions    Hydromorphone Hcl Anaphylaxis and Shortness Of Breath    Sertraline Anxiety, Other (See Comments) and Shortness Of Breath     More frequent panic attacks. RWK    Metoclopramide     Dilaudid [Hydromorphone Hcl] Rash       Problem List:    Patient Active Problem List   Diagnosis Code    Adjustment disorder with mixed anxiety and depressed mood F43.23    Iliotibial band syndrome affecting lower leg, right M76.31    Patellofemoral pain syndrome of right knee M22.2X1    History of recurrent UTIs Z87.440    Depressive disorder F32.9    Anxiety disorder, unspecified F41.9    Bereavement Z63.4    Insomnia G47.00    Piriformis syndrome of left side G57.02    Flank pain in pregnant patient O26.899, R10.9    Hypertension affecting pregnancy, antepartum O16.9    Encounter for planned induction of labor Z34.90       Past Medical History:        Diagnosis Date    Anemia     Anxiety     Depression     no meds    History of recurrent UTI (urinary tract infection)     Infections of kidney     Kidney stone 2011    Miscarriage     Other disorders of kidney and ureter     2 ureters on R side    Status post cystoscopy 11       Past Surgical History:        Procedure Laterality Date     SECTION  2014    X 2;  & ,  2015    [facility]  [id number]    [facility]    EYE SURGERY  2000    EYE SURGERY         Social History:    Social History     Tobacco Use    Smoking status: Never Smoker    Smokeless tobacco: Never Used   Substance Use Topics    Alcohol use:  No                                Counseling given: Not Answered      Vital Signs (Current):   Vitals:    21 1245 21 1249 21 1345 21 1415   BP:  118/76     Pulse:  77     Resp: 16 16 16 16   Temp:       TempSrc:       Weight:       Height:                                                  BP Readings from Last 3 Encounters:   21 118/76   21 99/61   20 102/71       NPO Status: Time of last liquid consumption: 0400                        Time of last solid consumption: 0400                        Date of last liquid consumption: 04/29/21                        Date of last solid food consumption: 04/29/21    BMI:   Wt Readings from Last 3 Encounters:   04/29/21 170 lb (77.1 kg)   11/17/20 148 lb (67.1 kg)   07/21/20 140 lb (63.5 kg)     Body mass index is 30.11 kg/m². CBC:   Lab Results   Component Value Date    WBC 7.7 04/29/2021    RBC 3.54 04/29/2021    HGB 9.4 04/29/2021    HCT 29.2 04/29/2021    MCV 82.3 04/29/2021    RDW 14.7 04/29/2021     04/29/2021       CMP:   Lab Results   Component Value Date     03/16/2021    K 3.7 03/16/2021    K 3.7 11/17/2020     03/16/2021    CO2 19 03/16/2021    BUN 6 03/16/2021    CREATININE <0.5 03/16/2021    GFRAA >60 03/16/2021    GFRAA >60 10/16/2011    AGRATIO 1.1 03/16/2021    LABGLOM >60 03/16/2021    GLUCOSE 119 03/16/2021    PROT 6.4 03/16/2021    PROT 8.7 10/16/2011    CALCIUM 8.3 03/16/2021    BILITOT <0.2 03/16/2021    ALKPHOS 121 03/16/2021    AST 15 03/16/2021    ALT 8 03/16/2021       POC Tests: No results for input(s): POCGLU, POCNA, POCK, POCCL, POCBUN, POCHEMO, POCHCT in the last 72 hours.     Coags: No results found for: PROTIME, INR, APTT    HCG (If Applicable):   Lab Results   Component Value Date    PREGTESTUR Positive 07/13/2020        ABGs: No results found for: PHART, PO2ART, EGM8OIO, SLY1PGM, BEART, K6WEVVCB     Type & Screen (If Applicable):  Lab Results   Component Value Date    LABABO A POSITIVE 05/09/2020       Drug/Infectious Status (If Applicable):  Lab Results   Component Value Date    HEPCAB <0.1 05/09/2020       COVID-19 Screening (If Applicable):   Lab Results   Component Value Date    COVID19 Not Detected 04/29/2021           Anesthesia Evaluation  Patient summary reviewed and Nursing notes reviewed history of anesthetic complications:   Airway: Mallampati: II  TM distance: >3 FB   Neck ROM: full  Mouth opening: > = 3 FB Dental: normal exam         Pulmonary:normal exam Cardiovascular:    (+) hypertension:,                   Neuro/Psych:   (+) depression/anxiety             GI/Hepatic/Renal:   (+) GERD:, renal disease: kidney stones,           Endo/Other: Negative Endo/Other ROS                    Abdominal:           Vascular: negative vascular ROS. Anesthesia Plan      regional and epidural     ASA 2 - emergent     (Patient request epidural for labor and delivery. Discussed procedure and risks including but not limited to changes in VSS, allergic reaction, infection, intravascular injection, paresthesia, n/v, severe headache, temporary or permanent rare neurologic sequelae and failed block. All questions answered. Patient agreed to proceed)        Anesthetic plan and risks discussed with patient.                       Lucas Morales, APRN - CRNA   4/29/2021

## 2021-04-30 PROCEDURE — 1200000000 HC SEMI PRIVATE

## 2021-04-30 PROCEDURE — 6360000002 HC RX W HCPCS: Performed by: OBSTETRICS & GYNECOLOGY

## 2021-04-30 PROCEDURE — 6370000000 HC RX 637 (ALT 250 FOR IP): Performed by: OBSTETRICS & GYNECOLOGY

## 2021-04-30 RX ORDER — ONDANSETRON 2 MG/ML
4 INJECTION INTRAMUSCULAR; INTRAVENOUS EVERY 6 HOURS PRN
Status: DISCONTINUED | OUTPATIENT
Start: 2021-04-30 | End: 2021-05-01 | Stop reason: HOSPADM

## 2021-04-30 RX ORDER — ALPRAZOLAM 0.25 MG/1
0.25 TABLET ORAL ONCE
Status: COMPLETED | OUTPATIENT
Start: 2021-04-30 | End: 2021-04-30

## 2021-04-30 RX ADMIN — IBUPROFEN 600 MG: 600 TABLET ORAL at 04:22

## 2021-04-30 RX ADMIN — ACETAMINOPHEN 650 MG: 325 TABLET ORAL at 04:22

## 2021-04-30 RX ADMIN — DOCUSATE SODIUM 100 MG: 100 CAPSULE, LIQUID FILLED ORAL at 09:11

## 2021-04-30 RX ADMIN — ONDANSETRON 4 MG: 2 INJECTION INTRAMUSCULAR; INTRAVENOUS at 10:00

## 2021-04-30 RX ADMIN — IBUPROFEN 600 MG: 600 TABLET ORAL at 13:28

## 2021-04-30 RX ADMIN — ALPRAZOLAM 0.25 MG: 0.25 TABLET ORAL at 16:26

## 2021-04-30 RX ADMIN — ACETAMINOPHEN 650 MG: 325 TABLET ORAL at 11:34

## 2021-04-30 RX ADMIN — DOCUSATE SODIUM 100 MG: 100 CAPSULE, LIQUID FILLED ORAL at 20:04

## 2021-04-30 ASSESSMENT — PAIN SCALES - GENERAL
PAINLEVEL_OUTOF10: 3
PAINLEVEL_OUTOF10: 4
PAINLEVEL_OUTOF10: 3

## 2021-04-30 NOTE — FLOWSHEET NOTE
Patient up to the bathroom with 2x standby assist.  Pt ambulated and tolerated well. Pt unable to void at this time. Pericare taught, pt instructed on perineal care and self administration of perineal meds. Pt verbalized understanding and may self medicate. Pt instructed to keep medications out of the reach of children. New peripad and icepack on pt. Pt ambulated to wheelchair. Patient transferred to postpartum 4402  via wheelchair and settled into postpartum room. Pt oriented to folder and postpartum care. Oriented to call light, phone and ordering meals. RN's name and phone number posted for pt. Siderails up x2. Pt oriented to equipment. Report to Jessa Doyle RN and care assumed at this time. Pt included in discussion and all questions answered.

## 2021-04-30 NOTE — PROGRESS NOTES
Case Management Mom/Baby Assessment    Identifying Information    Mother of Baby: Eldonna Cushing  Mother's : 4-                                      Father of Baby: Yoshi Love Father's :  3-    Baby's Name:  Freedom Augustin                                       Delivery Date: 21   Nursing concerns for baby:   Prenatal Care:     Reasoning for Referral: pt positive for Marijuana on admission    Assessment Information    Discharge Address: [place] Phone: [phone]    Resides with: lives with her  and four kids     Emergency Contact: Phone:     Support System:     Other Children    Name: Spring Hou  : 3-8-2012  Name: Genevieve Paulson  : 10-  Name: Marquis Babin  : 2015    Custody:     Father of Baby Involvement:      Have you ever had contact with Children's Services (describe):   denies    Car Seat has Diapers has  Crib/Bassinet has Feeding has [place]ette has    [address] has Medicaid has Food [place] [employer]   Transportation has    [employer]      Education    Occupation [employer] works    History   Domestic Abuse: denies  Physical Abuse:  denies  Sexual Abuse: denies  Drug Abuse: pt positive for Marijuana and admits to using MJ a couple times a week to help with anxiety. Prescription /Pharmacy Name Location Number:   Depression: pt states sever postpartum depression with child born in . Pt states she feels fine currently but wants to go back on medication. Pt states they have mental health resources and denies resources today   Medication/Counseling:     Summary: Pt seen by  with father of baby at bedside per pt request. Pt states she has material items and government resources in place. Pt aware she is positive for Marijuana on admission and admits to using weekly to help with anxiety. Pt aware a report made to [facility] CPS. [address] took report.   This social worker called back CPS and Hoa Juarez states a case will be opened and family followed at home. Will f/u on umbilical cord drug screen results and report to CPS. Pt clear to discharge infant from a social service point of view. Referrals: pt denies    Intervention: Report made. Family will be followed at home. Pt clear to d/c infant.      Flores DELGADOW, [facility]

## 2021-04-30 NOTE — PROGRESS NOTES
Called to room by pt's . Pt not feeling well. VSS, assessment WNL. Pt states \"I know I am ok and I know the baby is ok but I just feel very overwhelmed. \" Pt states she has history of panic attacks. Pt's  states he believes she is having a panic attack. Pt states her head feels heavy and warm, pt keeps repeating \"I just want this feeling to go away\". pt reassured, RN left room to phone Dr. Iza Benitez. This RN called Dr. Iza Benitez and notified of current situation, order placed for xanax. Will continue to monitor.

## 2021-04-30 NOTE — FLOWSHEET NOTE
Rn at bedside form 1920 to 46  Patient actively pushing. RN remains in continuous attendance at the bedside.  Assessment & evaluation of fetal heart rate ongoing via continuous EFM

## 2021-04-30 NOTE — PROGRESS NOTES
Obstetrics Postpartum  Section Note    Subjective:     Postpartum Day 1:   The patient feels well. The patient denies emotional concerns. Pain is well controlled with current medications. The baby iswell. Baby is feeding via breast. Urinary output is adequate. The patient is ambulating well. The patient is tolerating a normal diet. Flatus has been passed. Objective:      Patient Vitals for the past 8 hrs:   BP Temp Temp src Pulse Resp   21 0500 124/85 97.5 °F (36.4 °C) Oral 102 16   21 0200 109/66 98.1 °F (36.7 °C) Oral 92 16     General:    alert, appears stated age and cooperative   Bowel Sounds:  active   Lochia:  appropriate   Uterine Fundus:   firm, not tender, U=-2       DVT Evaluation:  No evidence of DVT seen on physical exam.     Assessment:     Status post vaginal delivery doing well post partum     Plan:     Plan for circumcision in AM and discharge tomorrow.

## 2021-04-30 NOTE — PROGRESS NOTES
Pt called out, stated she passed large blood clot and needs RN. This RN to bathroom to assess. Blood clot the size of a lemon noted/ pt just stepped out of shower. Pt states she feels ok, denies dizziness. No more blood noted in toilet or on pad. Pt reassured. Juanita care provided, pt bacl to bed with stand by assist. Fundus firm midline U/-1. Pt instructed to call with any increased bleeding or blood clots.  at bedside. Will continue to monitor.

## 2021-04-30 NOTE — ANESTHESIA POSTPROCEDURE EVALUATION
Department of Anesthesiology  Postprocedure Note    Patient: Natalya Reaves  MRN: [id number]  YOB: 1990  Date of evaluation: 4/30/2021  Time:  4:15 PM     Procedure Summary     Date: 04/29/21 Room / Location:     Anesthesia Start: [address] [facility] Anesthesia Stop: 1950    Procedure: Labor Analgesia Diagnosis:     Scheduled Providers:  Responsible Provider: Nestor Marroquin MD    Anesthesia Type: regional, epidural ASA Status: 2 - Emergent          Anesthesia Type: regional, epidural    Rufino Phase I:      Rufino Phase II:      Last vitals: Reviewed and per EMR flowsheets.        Anesthesia Post Evaluation    Patient location during evaluation: bedside  Patient participation: complete - patient participated  Level of consciousness: awake and alert  Airway patency: patent  Nausea & Vomiting: no nausea and no vomiting  Complications: no  Cardiovascular status: blood pressure returned to baseline  Respiratory status: acceptable  Hydration status: stable

## 2021-04-30 NOTE — L&D DELIVERY NOTE
Nuchal  Nuchal intervention: reduced  Nuchal cord description: loose nuchal cord  Cord around: shoulders  Number of loops: 1  Delayed cord clamping?: Yes  Cord clamped date/time: 2021  Cord blood disposition: Refrigerator  Gases sent?: Yes  Stem cell collection (by provider): No     Placenta    Date/time: 2021 19:55:00  Removal: Spontaneous  Appearance: Intact  Disposition: Refrigerator     Delivery Resuscitation    Method: Bulb Suction, Stimulation, Suctioning     Apgars    Living status: Living  Apgars   1 Minute:  5 Minute:  10 Minute 15 Minute 20 Minute   Skin Color: 0  0       Heart Rate: 2  2       Reflex Irritability: 2  2       Muscle Tone: 2  2       Respiratory Effort: 1  2       Total: 7  8               Apgars Assigned By: Carolina Wood RN     Skin to Skin    Skin to skin initiation date/time: 21 20:00:49   Skin to skin with: Mother  Skin to skin end date/time:         Measurements    Weight: 3630 g Length: 52.1 cm   Head circumference: 34.5 cm       Delivery Information    Episiotomy: None  Perineal lacerations: 1st Repaired: Yes   Vaginal laceration: No    Cervical laceration: No    Surgical or additional est. blood loss (mL): 0 (View Only): Edit in Flowsheets   Combined est. blood loss (mL): 0  Repair suture: None     Vaginal Delivery Counts    Initial count personnel: Carolina Wood RN  Initial count verified by: DR KATE   4x4:  Needles:  Instruments:  Lap Pads:  Sponges:    Initial counts:         Final counts:         Final count personnel: Brenton ONEIL RN  Final count verified by: DR KATE  Accurate final count?: Yes  Final vaginal sweep completed: Yes     Other Procedures    Procedures: None     Labor Length    3rd stage: 0h 05m        Spontaneous vaginal delivery over small first degree laceration repaired with 3-0 Vicryl in the usual fashion. Mom and baby doing well at this time.

## 2021-05-01 VITALS
RESPIRATION RATE: 16 BRPM | TEMPERATURE: 97.2 F | WEIGHT: 170 LBS | SYSTOLIC BLOOD PRESSURE: 130 MMHG | DIASTOLIC BLOOD PRESSURE: 84 MMHG | HEART RATE: 76 BPM | BODY MASS INDEX: 30.12 KG/M2 | HEIGHT: 63 IN | OXYGEN SATURATION: 100 %

## 2021-05-01 PROBLEM — Z34.90 ENCOUNTER FOR PLANNED INDUCTION OF LABOR: Status: RESOLVED | Noted: 2021-04-29 | Resolved: 2021-05-01

## 2021-05-01 PROCEDURE — 6370000000 HC RX 637 (ALT 250 FOR IP): Performed by: OBSTETRICS & GYNECOLOGY

## 2021-05-01 RX ADMIN — WITCH HAZEL 40 EACH: 500 SOLUTION RECTAL; TOPICAL at 08:50

## 2021-05-01 RX ADMIN — IBUPROFEN 600 MG: 600 TABLET ORAL at 04:09

## 2021-05-01 RX ADMIN — ACETAMINOPHEN 650 MG: 325 TABLET ORAL at 08:49

## 2021-05-01 RX ADMIN — DOCUSATE SODIUM 100 MG: 100 CAPSULE, LIQUID FILLED ORAL at 08:50

## 2021-05-01 ASSESSMENT — PAIN SCALES - GENERAL: PAINLEVEL_OUTOF10: 2

## 2021-05-01 NOTE — DISCHARGE SUMMARY
Obstetrical Discharge Form    Gestational Age: 39w0d    Antepartum complications:  contractions with history of prior  sections and with successful  last pregnancy. Date of Delivery:    Information for the patient's :  Johnny Hampton [[id number]]   @[facility]@      Information for the patient's :  Johnny Hampton [[id number]]   @[facility]       Type of Delivery: vaginal, spontaneous    Delivered By: Criss Mackay    Baby:      Information for the patient's :  Carri Bailey [[id number]]   APGAR One: 7     Information for the patient's :  Johnny Salas [[id number]]   APGAR Five: 8     Information for the patient's :  Carri Bailey [[id number]]   Birth Weight: 8 lb (3.63 kg)       Anesthesia: Epidural    Intrapartum complications: None    Postpartum complications: none    Discharge Medication:    Kristin Flores   Home Medication Instructions I:[id number]    Printed on:21 7651   Medication Information                      ondansetron (ZOFRAN) 8 MG tablet  Take 8 mg by mouth as needed             Prenatal MV & Min w/FA-DHA (PRENATAL ADULT GUMMY/DHA/FA) 0.4-25 MG CHEW  Take 1 tablet by mouth daily                  Discharge Condition:  good    Discharge Date: 2021    PLAN:  Follow up in 6 weeks for routine PP visit  All questions answered  D/C summary is written for D/C planning purposes, any delay in discharge from ordered D/C date due may be due to  factors and may be dependant on pediatric orders for  discharge planning.     Criss Mackay

## 2021-05-01 NOTE — PLAN OF CARE
Problem: Constipation:  Goal: Bowel elimination is within specified parameters  Description: Bowel elimination is within specified parameters  Outcome: Met This Shift     Problem: Fluid Volume - Imbalance:  Goal: Absence of imbalanced fluid volume signs and symptoms  Description: Absence of imbalanced fluid volume signs and symptoms  Outcome: Met This Shift  Goal: Absence of postpartum hemorrhage signs and symptoms  Description: Absence of postpartum hemorrhage signs and symptoms  Outcome: Met This Shift     Problem: Infection - Risk of, Puerperal Infection:  Goal: Will show no infection signs and symptoms  Description: Will show no infection signs and symptoms  Outcome: Met This Shift     Problem: Pain - Acute:  Goal: Pain level will decrease  Description: Pain level will decrease  Outcome: Met This Shift     Problem: Discharge Planning:  Goal: Discharged to appropriate level of care  Description: Discharged to appropriate level of care  Outcome: Ongoing     Problem: Mood - Altered:  Goal: Mood stable  Description: Mood stable  Outcome: Ongoing

## 2021-05-01 NOTE — FLOWSHEET NOTE
Talked with Dr. Alejandra Lees about patient's history of postpartum depression and panic attack during hospitalization. Follow up appointment moved up to two weeks. OB discussed various SSRI's with patient. No medications given at this time. Patient verbalized understanding.

## 2021-05-03 ENCOUNTER — TELEPHONE (OUTPATIENT)
Dept: FAMILY MEDICINE CLINIC | Age: 31
End: 2021-05-03

## 2021-07-08 ENCOUNTER — OFFICE VISIT (OUTPATIENT)
Dept: FAMILY MEDICINE CLINIC | Age: 31
End: 2021-07-08
Payer: MEDICAID

## 2021-07-08 VITALS — DIASTOLIC BLOOD PRESSURE: 68 MMHG | SYSTOLIC BLOOD PRESSURE: 104 MMHG | HEART RATE: 97 BPM | OXYGEN SATURATION: 98 %

## 2021-07-08 DIAGNOSIS — R35.0 URINE FREQUENCY: ICD-10-CM

## 2021-07-08 DIAGNOSIS — F43.23 ADJUSTMENT DISORDER WITH MIXED ANXIETY AND DEPRESSED MOOD: ICD-10-CM

## 2021-07-08 DIAGNOSIS — Z87.440 HISTORY OF RECURRENT UTIS: Primary | ICD-10-CM

## 2021-07-08 PROCEDURE — 99213 OFFICE O/P EST LOW 20 MIN: CPT | Performed by: NURSE PRACTITIONER

## 2021-07-08 PROCEDURE — 1036F TOBACCO NON-USER: CPT | Performed by: NURSE PRACTITIONER

## 2021-07-08 PROCEDURE — G8427 DOCREV CUR MEDS BY ELIG CLIN: HCPCS | Performed by: NURSE PRACTITIONER

## 2021-07-08 PROCEDURE — 81002 URINALYSIS NONAUTO W/O SCOPE: CPT | Performed by: NURSE PRACTITIONER

## 2021-07-08 PROCEDURE — G8417 CALC BMI ABV UP PARAM F/U: HCPCS | Performed by: NURSE PRACTITIONER

## 2021-07-08 RX ORDER — CEPHALEXIN 500 MG/1
500 CAPSULE ORAL 2 TIMES DAILY
Qty: 10 CAPSULE | Refills: 0 | Status: SHIPPED | OUTPATIENT
Start: 2021-07-08 | End: 2021-07-13

## 2021-07-08 RX ORDER — PAROXETINE 10 MG/1
10 TABLET, FILM COATED ORAL DAILY
Qty: 30 TABLET | Refills: 5 | Status: SHIPPED | OUTPATIENT
Start: 2021-07-08 | End: 2021-11-17

## 2021-07-08 ASSESSMENT — ENCOUNTER SYMPTOMS
SINUS PRESSURE: 0
BLOOD IN STOOL: 0
EYE REDNESS: 0
SHORTNESS OF BREATH: 0
COUGH: 0
BACK PAIN: 0
COLOR CHANGE: 0
SORE THROAT: 0
PHOTOPHOBIA: 0
ABDOMINAL PAIN: 0
CONSTIPATION: 0
VOICE CHANGE: 0
WHEEZING: 0
VOMITING: 0
EYE DISCHARGE: 0
EYE PAIN: 0
TROUBLE SWALLOWING: 0
DIARRHEA: 0
STRIDOR: 0
SINUS PAIN: 0
EYE ITCHING: 0
NAUSEA: 0
CHOKING: 0
RHINORRHEA: 0
CHEST TIGHTNESS: 0

## 2021-07-08 NOTE — PROGRESS NOTES
Chief Complaint   Patient presents with    Urinary Frequency     flank pain for 2 weeks        /68   Pulse 97   SpO2 98%     HPI:  Keya Macias is a 31 y.o. (: 1990) here today   for   HPI    Patient's medications, allergies, past medical, surgical, social and family histories were reviewed and updated as appropriate. Urine frequency:   Results for orders placed or performed in visit on 21   POCT Urinalysis no Micro   Result Value Ref Range    Color, UA yellow     Clarity, UA clear     Glucose, UA POC neg     Bilirubin, UA neg     Ketones, UA neg     Spec Grav, UA 1.020     Blood, UA POC neg     pH, UA 6.0     Protein, UA POC neg     Urobilinogen, UA 0.2     Leukocytes, UA moderate     Nitrite, UA neg      Will send for culture. Depression/anxiety: She does not do well on Zoloft. She is low risk for her Paxil. She is not sleeping and feels more paranoid but she realizes it. She feels off but no thoughts of hurting the baby. She has noticed her thoughts getting worse. Wants to stop it in its track. Fingertip pain: She gets sharp needle pains occasionally. This can stay for awhile but has continued. She will try NSAIDS prn to see if it is swelling related. Spoke about hand specialist. She will call if she wants referral.     ROS:  Review of Systems   Constitutional: Negative for activity change, appetite change, chills, diaphoresis, fatigue, fever and unexpected weight change. HENT: Negative for congestion, ear discharge, ear pain, hearing loss, nosebleeds, postnasal drip, rhinorrhea, sinus pressure, sinus pain, sneezing, sore throat, tinnitus, trouble swallowing and voice change. Eyes: Negative for photophobia, pain, discharge, redness and itching. Respiratory: Negative for cough, choking, chest tightness, shortness of breath, wheezing and stridor. Cardiovascular: Negative for chest pain, palpitations and leg swelling.    Gastrointestinal: Negative for abdominal pain, blood in stool, constipation, diarrhea, nausea and vomiting. Endocrine: Negative for cold intolerance, heat intolerance, polydipsia and polyuria. Genitourinary: Positive for flank pain, frequency and urgency. Negative for difficulty urinating, dysuria, enuresis and hematuria. Musculoskeletal: Negative for back pain, gait problem, joint swelling, neck pain and neck stiffness. Skin: Negative for color change, pallor, rash and wound. Allergic/Immunologic: Negative for environmental allergies and food allergies. Neurological: Negative for dizziness, tremors, syncope, speech difficulty, weakness, light-headedness, numbness and headaches. Hematological: Negative for adenopathy. Does not bruise/bleed easily. Psychiatric/Behavioral: Positive for behavioral problems and sleep disturbance. Negative for agitation, confusion, decreased concentration, dysphoric mood, hallucinations, self-injury and suicidal ideas. The patient is nervous/anxious. The patient is not hyperactive. Prior to Visit Medications    Medication Sig Taking? Authorizing Provider   PARoxetine (PAXIL) 10 MG tablet Take 1 tablet by mouth daily Yes TONJA Bagley CNP   cephALEXin (KEFLEX) 500 MG capsule Take 1 capsule by mouth 2 times daily for 5 days Yes TONJA Bagley CNP       Allergies   Allergen Reactions    Hydromorphone Hcl Anaphylaxis and Shortness Of Breath    Sertraline Anxiety, Other (See Comments) and Shortness Of Breath     More frequent panic attacks. RWK    Metoclopramide     Dilaudid [Hydromorphone Hcl] Rash       OBJECTIVE:      BP Readings from Last 2 Encounters:   07/08/21 104/68   05/01/21 130/84       Wt Readings from Last 3 Encounters:   04/29/21 170 lb (77.1 kg)   11/17/20 148 lb (67.1 kg)   07/21/20 140 lb (63.5 kg)       Physical Exam  Vitals reviewed. Constitutional:       General: She is not in acute distress. Appearance: Normal appearance. She is well-developed.    HENT: Head: Normocephalic and atraumatic. Right Ear: Hearing and external ear normal.      Left Ear: Hearing and external ear normal.      Nose: Nose normal.      Right Sinus: No maxillary sinus tenderness or frontal sinus tenderness. Left Sinus: No maxillary sinus tenderness or frontal sinus tenderness. Mouth/Throat:      Pharynx: No oropharyngeal exudate. Eyes:      General:         Right eye: No discharge. Left eye: No discharge. Neck:      Thyroid: No thyromegaly. Vascular: No JVD. Trachea: No tracheal deviation. Cardiovascular:      Rate and Rhythm: Normal rate and regular rhythm. Heart sounds: Normal heart sounds. No murmur heard. No friction rub. Pulmonary:      Effort: Pulmonary effort is normal. No respiratory distress. Breath sounds: Normal breath sounds. No stridor. No decreased breath sounds, wheezing, rhonchi or rales. Abdominal:      General: Bowel sounds are normal. There is no distension. Palpations: Abdomen is soft. There is no mass. Tenderness: There is no abdominal tenderness. There is no right CVA tenderness, left CVA tenderness, guarding or rebound. Musculoskeletal:         General: No tenderness. Normal range of motion. Cervical back: Normal range of motion. Lymphadenopathy:      Cervical: No cervical adenopathy. Skin:     General: Skin is warm and dry. Capillary Refill: Capillary refill takes less than 2 seconds. Findings: No rash. Neurological:      Mental Status: She is alert and oriented to person, place, and time. Sensory: Sensation is intact. Motor: Motor function is intact. Coordination: Coordination normal.   Psychiatric:         Attention and Perception: Attention and perception normal.         Mood and Affect: Mood normal.         Speech: Speech normal.         Behavior: Behavior normal. Behavior is cooperative. Thought Content:  Thought content normal.         Cognition and Memory: Cognition normal.         Judgment: Judgment normal.       ASSESSMENT/PLAN:    1. History of recurrent UTIs    - POCT Urinalysis no Micro  - Culture, Urine  - cephALEXin (KEFLEX) 500 MG capsule; Take 1 capsule by mouth 2 times daily for 5 days  Dispense: 10 capsule; Refill: 0    2. Adjustment disorder with mixed anxiety and depressed mood  - pre pregnancy she was doing well on Paxil, she had adverse reactions to multiple other psych meds. See allergy list. Will restart her on paxil and have her follow up in 4-6 weeks. - PARoxetine (PAXIL) 10 MG tablet; Take 1 tablet by mouth daily  Dispense: 30 tablet; Refill: 5    Follow up in 6 weeks for depression/anxiety. If she has any issues follow up sooner. Follow up if symptoms do not improve or worsen. If the patient becomes short of breath go straight to the ER or call 911.

## 2021-07-09 LAB — URINE CULTURE, ROUTINE: NORMAL

## 2021-11-17 ENCOUNTER — OFFICE VISIT (OUTPATIENT)
Dept: FAMILY MEDICINE CLINIC | Age: 31
End: 2021-11-17
Payer: COMMERCIAL

## 2021-11-17 VITALS
DIASTOLIC BLOOD PRESSURE: 74 MMHG | HEART RATE: 70 BPM | SYSTOLIC BLOOD PRESSURE: 120 MMHG | WEIGHT: 136 LBS | BODY MASS INDEX: 24.09 KG/M2 | OXYGEN SATURATION: 96 %

## 2021-11-17 DIAGNOSIS — B97.7 HPV (HUMAN PAPILLOMA VIRUS) INFECTION: Primary | ICD-10-CM

## 2021-11-17 PROCEDURE — 99212 OFFICE O/P EST SF 10 MIN: CPT

## 2021-11-17 ASSESSMENT — ENCOUNTER SYMPTOMS
COUGH: 0
SHORTNESS OF BREATH: 0
WHEEZING: 0
EYE DISCHARGE: 0
SORE THROAT: 0
ABDOMINAL PAIN: 0
COLOR CHANGE: 0
ABDOMINAL DISTENTION: 0
CHEST TIGHTNESS: 0
RHINORRHEA: 0
CHOKING: 0
CONSTIPATION: 0
TROUBLE SWALLOWING: 0
DIARRHEA: 0
EYE PAIN: 0

## 2021-11-17 NOTE — PROGRESS NOTES
Chief Complaint   Patient presents with    Mouth Lesions       HPI:  Jennifer Sanders is a 36 y.o. (: 1990) here today for evaluations of 3 lesions inside her mouth. States the lesions are red bumps that are sometimes white. States she pops the bumps when they get inflamed and become white head looking. Have been been present x2 years. Had HPV vaccine as a teenager. She states one bump in her mouth has been present for 2 years has pretty much healed over and does not cause her any issues. Patient use warm gargle salt water rinses at times which helps somewhat with her symptoms.  is present with pt and states he never got the HPV vaccine. Have been  x10 years. Sees  in [facility] for PAP's    Patient's medications, allergies, past medical, surgical, social and family histories were reviewed and updated asappropriate. ROS:  Review of Systems   Constitutional: Negative for activity change, appetite change, chills, fatigue, fever and unexpected weight change. HENT: Negative for rhinorrhea, sore throat and trouble swallowing. Eyes: Negative for pain, discharge and visual disturbance. Respiratory: Negative for cough, choking, chest tightness, shortness of breath and wheezing. Cardiovascular: Negative for chest pain, palpitations and leg swelling. Gastrointestinal: Negative for abdominal distention, abdominal pain, constipation and diarrhea. Endocrine: Negative for cold intolerance and heat intolerance. Genitourinary: Negative for difficulty urinating. Musculoskeletal: Negative for gait problem and neck stiffness. Skin: Negative for color change and rash. 3 red bumps inside mouth, 2 of which sometimes have whiteheads. Neurological: Negative for dizziness, weakness and headaches. Psychiatric/Behavioral: Negative for dysphoric mood and sleep disturbance. Prior to Visit Medications    Medication Sig Taking?  Authorizing Provider Doxylamine Succinate, Sleep, (SLEEP AID PO) Take by mouth Sleep 3 Yes Historical Provider, MD       Allergies   Allergen Reactions    Hydromorphone Hcl Anaphylaxis and Shortness Of Breath    Sertraline Anxiety, Other (See Comments) and Shortness Of Breath     More frequent panic attacks. RWK    Metoclopramide     Dilaudid [Hydromorphone Hcl] Rash       OBJECTIVE:    /74   Pulse 70   Wt 136 lb (61.7 kg)   SpO2 96%   BMI 24.09 kg/m²     BP Readings from Last 2 Encounters:   11/17/21 120/74   07/08/21 104/68       Wt Readings from Last 3 Encounters:   11/17/21 136 lb (61.7 kg)   04/29/21 170 lb (77.1 kg)   11/17/20 148 lb (67.1 kg)       Physical Exam  Constitutional:       Appearance: Normal appearance. HENT:      Head: Normocephalic and atraumatic. Right Ear: External ear normal.      Left Ear: External ear normal.      Nose: Nose normal. No congestion. Mouth/Throat:      Mouth: Mucous membranes are moist.      Pharynx: Oropharynx is clear. Comments: Red bumps in mouth x2 and mouth. One red bump that appears healed over and mouth. Size consistent with what appears to be oral HPV. Eyes:      Extraocular Movements: Extraocular movements intact. Pupils: Pupils are equal, round, and reactive to light. Cardiovascular:      Rate and Rhythm: Normal rate and regular rhythm. Pulses: Normal pulses. Heart sounds: Normal heart sounds. No murmur heard. Pulmonary:      Effort: Pulmonary effort is normal. No respiratory distress. Breath sounds: Normal breath sounds. No wheezing. Abdominal:      General: Bowel sounds are normal.      Palpations: Abdomen is soft. Tenderness: There is no abdominal tenderness. Musculoskeletal:         General: Normal range of motion. Cervical back: Normal range of motion and neck supple. Right lower leg: No edema. Left lower leg: No edema. Skin:     General: Skin is warm and dry.       Capillary Refill: Capillary refill takes less than 2 seconds. Findings: No rash. Neurological:      General: No focal deficit present. Mental Status: She is alert and oriented to person, place, and time. Motor: No weakness. Psychiatric:         Mood and Affect: Mood normal.         Behavior: Behavior normal.         ASSESSMENT/PLAN:    1. HPV (human papilloma virus) infection  Upon assessment of the oral mucosa there were 3 noted red bumps. 1 of which looks healed over and is not inflamed nor does have a cronin. One bump is very small which the patient states is a new in the last week. The other bump has some mild redness due to the fact the patient recently popped a white spot that arose on the site. I do believe the presentation is consistent with oral HPV infection. Patient states her one lesion that is pretty well healed over has been present for 2 years and has not ever a problem at this point. Patient questioned how she could have gotten HPV. I discussed the patient in length that HPV is typically a sexually transmitted disease but can be nonsexually transmitted. I advised the patient did not pop the lesions in her mouth because this can cause further spreading the virus. Patient is due for annual Pap smear. Also discussed the patient there really is no cure or treatment for HPV infection such as the one that I suspect she has. I advised the patient to follow-up with her GYN for Pap smear and discussed the possibility of HPV as well that can be assessed for upon examination. Patient agrees and will do so. Advised the patient to follow back up if her symptoms worsen or she has new symptoms that arise. This document was prepared by a combination of typing and transcription through a voice recognition software.     TONJA Abraham - CNP

## 2021-12-07 ENCOUNTER — TELEPHONE (OUTPATIENT)
Dept: FAMILY MEDICINE CLINIC | Age: 31
End: 2021-12-07

## 2021-12-07 RX ORDER — PROMETHAZINE HYDROCHLORIDE 12.5 MG/1
12.5 TABLET ORAL EVERY 8 HOURS PRN
Qty: 20 TABLET | Refills: 0 | Status: SHIPPED | OUTPATIENT
Start: 2021-12-07 | End: 2021-12-14

## 2021-12-07 NOTE — TELEPHONE ENCOUNTER
----- Message from Monica Jameson sent at 12/7/2021 10:40 AM EST -----  Subject: Message to Provider    QUESTIONS  Information for Provider? patient has extreme nausea due to pregnancy   cannot get in to see obnievesn until [address] . she wants to know if dr. Karl Kaminski   could order her something for today she scheduled an appointment for   tomorrow. please call her back   ---------------------------------------------------------------------------  --------------  CALL BACK INFO  What is the best way for the office to contact you? OK to leave message on   voicemail  Preferred Call Back Phone Number? [phone]  ---------------------------------------------------------------------------  --------------  SCRIPT ANSWERS  Relationship to Patient?  Self

## 2021-12-08 ENCOUNTER — OFFICE VISIT (OUTPATIENT)
Dept: FAMILY MEDICINE CLINIC | Age: 31
End: 2021-12-08
Payer: COMMERCIAL

## 2021-12-08 VITALS
SYSTOLIC BLOOD PRESSURE: 92 MMHG | BODY MASS INDEX: 23.92 KG/M2 | DIASTOLIC BLOOD PRESSURE: 64 MMHG | OXYGEN SATURATION: 99 % | HEIGHT: 63 IN | WEIGHT: 135 LBS | HEART RATE: 82 BPM

## 2021-12-08 DIAGNOSIS — Z3A.01 LESS THAN 8 WEEKS GESTATION OF PREGNANCY: Primary | ICD-10-CM

## 2021-12-08 DIAGNOSIS — R11.2 NAUSEA AND VOMITING, INTRACTABILITY OF VOMITING NOT SPECIFIED, UNSPECIFIED VOMITING TYPE: ICD-10-CM

## 2021-12-08 LAB
A/G RATIO: 2.2 (ref 1.1–2.2)
ALBUMIN SERPL-MCNC: 4.9 G/DL (ref 3.4–5)
ALP BLD-CCNC: 72 U/L (ref 40–129)
ALT SERPL-CCNC: 9 U/L (ref 10–40)
ANION GAP SERPL CALCULATED.3IONS-SCNC: 11 MMOL/L (ref 3–16)
AST SERPL-CCNC: 13 U/L (ref 15–37)
BASOPHILS ABSOLUTE: 0 K/UL (ref 0–0.2)
BASOPHILS RELATIVE PERCENT: 0.7 %
BILIRUB SERPL-MCNC: <0.2 MG/DL (ref 0–1)
BUN BLDV-MCNC: 8 MG/DL (ref 7–20)
CALCIUM SERPL-MCNC: 9 MG/DL (ref 8.3–10.6)
CHLORIDE BLD-SCNC: 104 MMOL/L (ref 99–110)
CO2: 21 MMOL/L (ref 21–32)
CREAT SERPL-MCNC: <0.5 MG/DL (ref 0.6–1.1)
EOSINOPHILS ABSOLUTE: 0 K/UL (ref 0–0.6)
EOSINOPHILS RELATIVE PERCENT: 0.7 %
GFR AFRICAN AMERICAN: >60
GFR NON-AFRICAN AMERICAN: >60
GLUCOSE BLD-MCNC: 92 MG/DL (ref 70–99)
GONADOTROPIN, CHORIONIC (HCG) QUANT: NORMAL MIU/ML
HCT VFR BLD CALC: 36.1 % (ref 36–48)
HEMOGLOBIN: 11.9 G/DL (ref 12–16)
LYMPHOCYTES ABSOLUTE: 1.7 K/UL (ref 1–5.1)
LYMPHOCYTES RELATIVE PERCENT: 34.5 %
MCH RBC QN AUTO: 27.7 PG (ref 26–34)
MCHC RBC AUTO-ENTMCNC: 33 G/DL (ref 31–36)
MCV RBC AUTO: 84 FL (ref 80–100)
MONOCYTES ABSOLUTE: 0.3 K/UL (ref 0–1.3)
MONOCYTES RELATIVE PERCENT: 5.7 %
NEUTROPHILS ABSOLUTE: 2.9 K/UL (ref 1.7–7.7)
NEUTROPHILS RELATIVE PERCENT: 58.4 %
PDW BLD-RTO: 14.5 % (ref 12.4–15.4)
PLATELET # BLD: 274 K/UL (ref 135–450)
PMV BLD AUTO: 8.2 FL (ref 5–10.5)
POTASSIUM SERPL-SCNC: 4 MMOL/L (ref 3.5–5.1)
RBC # BLD: 4.29 M/UL (ref 4–5.2)
SODIUM BLD-SCNC: 136 MMOL/L (ref 136–145)
TOTAL PROTEIN: 7.1 G/DL (ref 6.4–8.2)
WBC # BLD: 5 K/UL (ref 4–11)

## 2021-12-08 PROCEDURE — G8427 DOCREV CUR MEDS BY ELIG CLIN: HCPCS | Performed by: FAMILY MEDICINE

## 2021-12-08 PROCEDURE — G8420 CALC BMI NORM PARAMETERS: HCPCS | Performed by: FAMILY MEDICINE

## 2021-12-08 PROCEDURE — 1036F TOBACCO NON-USER: CPT | Performed by: FAMILY MEDICINE

## 2021-12-08 PROCEDURE — G8484 FLU IMMUNIZE NO ADMIN: HCPCS | Performed by: FAMILY MEDICINE

## 2021-12-08 PROCEDURE — 99213 OFFICE O/P EST LOW 20 MIN: CPT | Performed by: FAMILY MEDICINE

## 2021-12-08 PROCEDURE — 36415 COLL VENOUS BLD VENIPUNCTURE: CPT | Performed by: FAMILY MEDICINE

## 2021-12-08 ASSESSMENT — ENCOUNTER SYMPTOMS
VOMITING: 1
ABDOMINAL PAIN: 1
NAUSEA: 1

## 2021-12-08 NOTE — PROGRESS NOTES
Chief Complaint   Patient presents with    Nausea & Vomiting       HPI:  Janine aMrsh is a 36 y.o. (: 1990) here today   for nausea and vomiting since being pregnant. HPI  Did take phenergan last pm.  Helped urge to throw up, but still had nausea. Is approx 6-7 weeks pregnant. appt w/ OB on 1/3. No vaginal bleeding noted. Has not had period. Did home preg test.  Has had some cramping. Used zofran w/ prior pregnancy. Patient's medications, allergies, past medical, surgical, social and family histories were reviewed and updated as appropriate. ROS:  Review of Systems   Constitutional: Positive for fatigue. Negative for fever. Gastrointestinal: Positive for abdominal pain, nausea and vomiting. Genitourinary: Negative for vaginal pain. Prior to Visit Medications    Medication Sig Taking? Authorizing Provider   promethazine (PHENERGAN) 12.5 MG tablet Take 1 tablet by mouth every 8 hours as needed for Nausea Yes Vikas Mg MD   Doxylamine Succinate, Sleep, (SLEEP AID PO) Take by mouth Sleep 3 Yes Historical Provider, MD       Allergies   Allergen Reactions    Hydromorphone Hcl Anaphylaxis and Shortness Of Breath    Sertraline Anxiety, Other (See Comments) and Shortness Of Breath     More frequent panic attacks. RWK    Metoclopramide     Dilaudid [Hydromorphone Hcl] Rash       OBJECTIVE:    BP 92/64   Pulse 82   Ht 5' 3\" (1.6 m)   Wt 135 lb (61.2 kg)   SpO2 99%   BMI 23.91 kg/m²     BP Readings from Last 2 Encounters:   21 92/64   21 120/74       Wt Readings from Last 3 Encounters:   21 135 lb (61.2 kg)   21 136 lb (61.7 kg)   21 170 lb (77.1 kg)       Physical Exam  Constitutional:       Appearance: Normal appearance. HENT:      Head: Normocephalic and atraumatic. Eyes:      Extraocular Movements: Extraocular movements intact. Cardiovascular:      Rate and Rhythm: Normal rate and regular rhythm.    Pulmonary: Effort: Pulmonary effort is normal.      Breath sounds: Normal breath sounds. Neurological:      General: No focal deficit present. Mental Status: She is alert and oriented to person, place, and time. Psychiatric:         Mood and Affect: Mood normal.         Behavior: Behavior normal.           ASSESSMENT/PLAN:     1. Less than 8 weeks gestation of pregnancy  Recently positive home preg test.  See below. Check labs due to n/v.  Periods had not been normal since birth of prior child (approx 7 mo). - CBC Auto Differential  - COMPREHENSIVE METABOLIC PANEL  - HCG, QUANTITATIVE, PREGNANCY    2. Nausea and vomiting, intractability of vomiting not specified, unspecified vomiting type  Has phenergan. Mild relief. Slight inc risk in pregnancy w/ zofran, but benefits may outweigh risks if not improved and unable to shaunna PO. F/u w/ ob as planned.

## 2021-12-09 NOTE — RESULT ENCOUNTER NOTE
hCG is in the range of a 4 to 7-week pregnancy, consistent with her history. The ranges of normal are quite large and difficult to pinpoint exactly. CMP is okay. Prior anemia has improved.   Otherwise CBC essentially normal.  Follow-up with OB as planned

## 2021-12-15 ENCOUNTER — HOSPITAL ENCOUNTER (EMERGENCY)
Age: 31
Discharge: LEFT BEFORE DISCHARGE | End: 2021-12-15
Payer: COMMERCIAL

## 2021-12-15 VITALS
DIASTOLIC BLOOD PRESSURE: 60 MMHG | OXYGEN SATURATION: 100 % | WEIGHT: 135 LBS | SYSTOLIC BLOOD PRESSURE: 99 MMHG | HEART RATE: 68 BPM | BODY MASS INDEX: 23.92 KG/M2 | TEMPERATURE: 98.9 F | HEIGHT: 63 IN | RESPIRATION RATE: 18 BRPM

## 2021-12-15 LAB
BACTERIA: ABNORMAL /HPF
BILIRUBIN URINE: ABNORMAL
BLOOD, URINE: NEGATIVE
CLARITY: ABNORMAL
COLOR: YELLOW
EPITHELIAL CELLS, UA: ABNORMAL /HPF (ref 0–5)
GLUCOSE URINE: NEGATIVE MG/DL
KETONES, URINE: >=80 MG/DL
LEUKOCYTE ESTERASE, URINE: NEGATIVE
MICROSCOPIC EXAMINATION: YES
MUCUS: ABNORMAL /LPF
NITRITE, URINE: NEGATIVE
PH UA: 5.5 (ref 5–8)
PROTEIN UA: ABNORMAL MG/DL
RBC UA: ABNORMAL /HPF (ref 0–4)
SPECIFIC GRAVITY UA: >=1.03 (ref 1–1.03)
URINE TYPE: ABNORMAL
UROBILINOGEN, URINE: 0.2 E.U./DL
WBC UA: ABNORMAL /HPF (ref 0–5)

## 2021-12-15 PROCEDURE — 4500000002 HC ER NO CHARGE

## 2021-12-15 PROCEDURE — 81001 URINALYSIS AUTO W/SCOPE: CPT

## 2021-12-15 NOTE — ED NOTES
[facility]  12/15/21 [id number]

## 2021-12-15 NOTE — ED TRIAGE NOTES
Pt presents to the ED from home c/o increased n/v from pregnancy. Pt states her OB will not give her zofran until 10 weeks. Pt unable to complete ADLs due to vomiting.

## 2021-12-16 ENCOUNTER — TELEPHONE (OUTPATIENT)
Dept: FAMILY MEDICINE CLINIC | Age: 31
End: 2021-12-16

## 2021-12-16 LAB
A/G RATIO: 1.7 G/DL (ref 1–2.5)
ALBUMIN SERPL-MCNC: 5 G/DL (ref 3.5–5)
ALP BLD-CCNC: 65 U/L (ref 38–126)
ALT SERPL-CCNC: 21 U/L (ref 0–34)
AMORPHOUS: NEGATIVE
ANION GAP SERPL CALCULATED.3IONS-SCNC: 16.6 MMOL/L (ref 8–16)
AST SERPL-CCNC: 36 U/L (ref 14–36)
BACTERIA: NORMAL
BASOPHILS RELATIVE PERCENT: 0.2 % (ref 0–1)
BILIRUB SERPL-MCNC: 0.6 MG/DL (ref 0.2–1.3)
BILIRUBIN URINE: POSITIVE
BUN BLDV-MCNC: 14 MG/DL (ref 7–17)
CALCIUM SERPL-MCNC: 9.4 MG/DL (ref 8.6–10.3)
CASTS: NEGATIVE
CHLORIDE BLD-SCNC: 106 MMOL/L (ref 98–107)
CLARITY: CLEAR
CO2: 19 MMOL/L (ref 22–30)
COLOR: YELLOW
CREAT SERPL-MCNC: 0.5 MG/DL (ref 0.52–1.04)
CRYSTALS, UA: NEGATIVE
EOSINOPHILS RELATIVE PERCENT: 0.2 % (ref 0–5)
EPITHELIAL CELLS: NORMAL
ERYTHROCYTE DISTRIBUTION WIDTH RBC RATIO: 13.2 % (ref 11.6–14.8)
ERYTHROCYTES URINE: NEGATIVE
ERYTHROCYTES URINE: NEGATIVE
GFR CALCULATED: 146
GLOBULIN: 3 G/DL (ref 2–3.5)
GLUCOSE BLD-MCNC: 82 MG/DL (ref 74–100)
GLUCOSE URINE: NEGATIVE
GRANULOCYTE ABSOLUTE COUNT: 4.3 X(10)3/UL (ref 1.8–7.2)
HCG QUALITATIVE: NORMAL MIU/ML
HCG URINE: POSITIVE
HCT VFR BLD CALC: 38.3 % (ref 35.7–46.7)
HEMOGLOBIN: 13.1 G/DL (ref 11.9–15.9)
IMMATURE GRANULOCYTES %: 0.2 % (ref 0–0.5)
INTERNAL QC: NORMAL
KETONES, URINE: >=80
LEUKOCYTE ESTERASE, URINE: NORMAL
LEUKOCYTES, UA: NORMAL
LYMPHOCYTES ABSOLUTE: 1.5 X(10)3/UL (ref 1.1–2.7)
LYMPHOCYTES RELATIVE PERCENT: 24.7 % (ref 15–45)
MCH RBC QN AUTO: 28.4 PG (ref 28.1–33.6)
MCHC RBC AUTO-ENTMCNC: 34.2 G/DL (ref 32.8–34.7)
MCV RBC AUTO: 82.9 FL (ref 82.9–99.9)
MONOCYTES RELATIVE PERCENT: 5.8 % (ref 0–12)
MUCUS, URINE: NORMAL
NEUTROPHILS/100 LEUKOCYTES: 68.9 % (ref 40–70)
NITRITE SER/PLAS SCNC PT QN: NEGATIVE
PH UA: 6
PLATELETS: 293 X1000 (ref 175–420)
POTASSIUM SERPL-SCNC: 4.6 MMOL/L (ref 3.4–5.1)
PROTEIN FLUID: NORMAL MG/DL
RBC # BLD: 4.62 X1000000 (ref 3.72–5.31)
SODIUM BLD-SCNC: 137 MMOL/L (ref 137–145)
SPECIFIC GRAVITY UA: >=1.03 (ref 1–1.03)
TOTAL PROTEIN: 8 G/DL (ref 6.3–8.2)
TRICHOMONAS: NEGATIVE
UROBILINOGEN, URINE: 0.2 MG/DL (ref 0.2–1)
WBC # BLD: 6.2 X1000 (ref 4.5–10.3)
YEAST, URINE: NEGATIVE

## 2021-12-16 NOTE — TELEPHONE ENCOUNTER
Patient still w/ significant amt of nausea,  said phenergan isnt helping, he said you had mentioned possibly sending zofran

## 2021-12-17 ENCOUNTER — TELEPHONE (OUTPATIENT)
Dept: FAMILY MEDICINE CLINIC | Age: 31
End: 2021-12-17

## 2021-12-17 RX ORDER — ONDANSETRON 4 MG/1
4 TABLET, FILM COATED ORAL EVERY 8 HOURS PRN
Qty: 20 TABLET | Refills: 0 | Status: SHIPPED | OUTPATIENT
Start: 2021-12-17

## 2021-12-17 NOTE — TELEPHONE ENCOUNTER
We had discussed the potential risks of Zofran. She states he had use it in the past.  Is Phenergan helping?   What prescription was she given from the ER?

## 2022-01-05 LAB
A/G RATIO: 1.8 G/DL (ref 1–2.5)
ALBUMIN SERPL-MCNC: 4.9 G/DL (ref 3.5–5)
ALP BLD-CCNC: 56 U/L (ref 38–126)
ALT SERPL-CCNC: 15 U/L (ref 0–34)
AMORPHOUS: NEGATIVE
ANION GAP SERPL CALCULATED.3IONS-SCNC: 13.9 MMOL/L (ref 8–16)
AST SERPL-CCNC: 26 U/L (ref 14–36)
BACTERIA: NORMAL
BASOPHILS RELATIVE PERCENT: 0.2 % (ref 0–1)
BILIRUB SERPL-MCNC: 0.4 MG/DL (ref 0.2–1.3)
BILIRUBIN URINE: POSITIVE
BUN BLDV-MCNC: 11 MG/DL (ref 7–17)
CALCIUM SERPL-MCNC: 9.7 MG/DL (ref 8.6–10.3)
CASTS: NEGATIVE
CHLORIDE BLD-SCNC: 104 MMOL/L (ref 98–107)
CLARITY: NORMAL
CO2: 22 MMOL/L (ref 22–30)
COLOR: YELLOW
CREAT SERPL-MCNC: 0.5 MG/DL (ref 0.52–1.04)
CRYSTALS, UA: NEGATIVE
EOSINOPHILS RELATIVE PERCENT: 0.2 % (ref 0–5)
EPITHELIAL CELLS: NORMAL
ERYTHROCYTE DISTRIBUTION WIDTH RBC RATIO: 12.5 % (ref 11.6–14.8)
ERYTHROCYTES URINE: NEGATIVE
ERYTHROCYTES URINE: NORMAL
GFR CALCULATED: 146
GLOBULIN: 2.9 G/DL (ref 2–3.5)
GLUCOSE BLD-MCNC: 92 MG/DL (ref 74–100)
GLUCOSE URINE: NEGATIVE
GRANULOCYTE ABSOLUTE COUNT: 2.2 X(10)3/UL (ref 1.8–7.2)
HCT VFR BLD CALC: 37.5 % (ref 35.7–46.7)
HEMOGLOBIN: 12.6 G/DL (ref 11.9–15.9)
IMMATURE GRANULOCYTES %: 0.2 % (ref 0–0.5)
KETONES, URINE: >=80
LEUKOCYTE ESTERASE, URINE: NORMAL
LEUKOCYTES, UA: NORMAL
LYMPHOCYTES ABSOLUTE: 2.1 X(10)3/UL (ref 1.1–2.7)
LYMPHOCYTES RELATIVE PERCENT: 44.5 % (ref 15–45)
MCH RBC QN AUTO: 27.3 PG (ref 28.1–33.6)
MCHC RBC AUTO-ENTMCNC: 33.6 G/DL (ref 32.8–34.7)
MCV RBC AUTO: 81.3 FL (ref 82.9–99.9)
MONOCYTES RELATIVE PERCENT: 8.6 % (ref 0–12)
MUCUS, URINE: NEGATIVE
NEUTROPHILS/100 LEUKOCYTES: 46.3 % (ref 40–70)
NITRITE SER/PLAS SCNC PT QN: NEGATIVE
PH UA: 6
PLATELETS: 250 X1000 (ref 175–420)
POTASSIUM SERPL-SCNC: 3.9 MMOL/L (ref 3.4–5.1)
PROTEIN FLUID: NORMAL MG/DL
RBC # BLD: 4.61 X1000000 (ref 3.72–5.31)
SODIUM BLD-SCNC: 136 MMOL/L (ref 137–145)
SPECIFIC GRAVITY UA: >=1.03 (ref 1–1.03)
TOTAL PROTEIN: 7.8 G/DL (ref 6.3–8.2)
TRICHOMONAS: NEGATIVE
UROBILINOGEN, URINE: 1 MG/DL (ref 0.2–1)
WBC # BLD: 4.8 X1000 (ref 4.5–10.3)
YEAST, URINE: NEGATIVE

## 2022-08-17 NOTE — TELEPHONE ENCOUNTER
Patient  calling and states she was given a prescription for nausea and vomiting from the ER but they just arrived in [place] on vacation and the [facility] there won't take the prescription.  is asking if we can e-scribe a prescription to the [facility] [address] Dr. Camila Blackman [id number]. NIKOLE

## 2022-09-20 ENCOUNTER — TELEPHONE (OUTPATIENT)
Dept: FAMILY MEDICINE CLINIC | Age: 32
End: 2022-09-20

## 2022-09-20 NOTE — TELEPHONE ENCOUNTER
Tried to call patient. VM is full. Patiwnt is scheduled at wrong office. She needs to be scheduled With HER PCP OFFICE.  Ursula Najera MD

## 2022-09-21 ENCOUNTER — OFFICE VISIT (OUTPATIENT)
Dept: FAMILY MEDICINE CLINIC | Age: 32
End: 2022-09-21
Payer: COMMERCIAL

## 2022-09-21 VITALS
DIASTOLIC BLOOD PRESSURE: 64 MMHG | HEART RATE: 74 BPM | SYSTOLIC BLOOD PRESSURE: 98 MMHG | BODY MASS INDEX: 24.45 KG/M2 | OXYGEN SATURATION: 97 % | WEIGHT: 138 LBS

## 2022-09-21 DIAGNOSIS — R82.998 LEUKOCYTES IN URINE: ICD-10-CM

## 2022-09-21 DIAGNOSIS — R10.84 GENERALIZED ABDOMINAL PAIN: Primary | ICD-10-CM

## 2022-09-21 DIAGNOSIS — K21.9 GASTROESOPHAGEAL REFLUX DISEASE, UNSPECIFIED WHETHER ESOPHAGITIS PRESENT: ICD-10-CM

## 2022-09-21 DIAGNOSIS — F32.A ANXIETY AND DEPRESSION: ICD-10-CM

## 2022-09-21 DIAGNOSIS — F41.9 ANXIETY AND DEPRESSION: ICD-10-CM

## 2022-09-21 LAB
A/G RATIO: 2 (ref 1.1–2.2)
ALBUMIN SERPL-MCNC: 4.7 G/DL (ref 3.4–5)
ALP BLD-CCNC: 65 U/L (ref 40–129)
ALT SERPL-CCNC: 25 U/L (ref 10–40)
ANION GAP SERPL CALCULATED.3IONS-SCNC: 12 MMOL/L (ref 3–16)
AST SERPL-CCNC: 23 U/L (ref 15–37)
BASOPHILS ABSOLUTE: 0 K/UL (ref 0–0.2)
BASOPHILS RELATIVE PERCENT: 0.2 %
BILIRUB SERPL-MCNC: <0.2 MG/DL (ref 0–1)
BILIRUBIN, POC: NORMAL
BLOOD URINE, POC: NEGATIVE
BUN BLDV-MCNC: 16 MG/DL (ref 7–20)
CALCIUM SERPL-MCNC: 9 MG/DL (ref 8.3–10.6)
CHLORIDE BLD-SCNC: 106 MMOL/L (ref 99–110)
CLARITY, POC: CLEAR
CO2: 24 MMOL/L (ref 21–32)
COLOR, POC: YELLOW
CREAT SERPL-MCNC: 0.7 MG/DL (ref 0.6–1.1)
EOSINOPHILS ABSOLUTE: 0.2 K/UL (ref 0–0.6)
EOSINOPHILS RELATIVE PERCENT: 3.1 %
GFR AFRICAN AMERICAN: >60
GFR NON-AFRICAN AMERICAN: >60
GLUCOSE BLD-MCNC: 95 MG/DL (ref 70–99)
GLUCOSE URINE, POC: NEGATIVE
HCT VFR BLD CALC: 35.7 % (ref 36–48)
HEMOGLOBIN: 11.5 G/DL (ref 12–16)
KETONES, POC: NEGATIVE
LEUKOCYTE EST, POC: NORMAL
LIPASE: 30 U/L (ref 13–60)
LYMPHOCYTES ABSOLUTE: 2.2 K/UL (ref 1–5.1)
LYMPHOCYTES RELATIVE PERCENT: 41.7 %
MCH RBC QN AUTO: 26.9 PG (ref 26–34)
MCHC RBC AUTO-ENTMCNC: 32.2 G/DL (ref 31–36)
MCV RBC AUTO: 83.4 FL (ref 80–100)
MONOCYTES ABSOLUTE: 0.4 K/UL (ref 0–1.3)
MONOCYTES RELATIVE PERCENT: 6.7 %
NEUTROPHILS ABSOLUTE: 2.6 K/UL (ref 1.7–7.7)
NEUTROPHILS RELATIVE PERCENT: 48.3 %
NITRITE, POC: NEGATIVE
PDW BLD-RTO: 18.1 % (ref 12.4–15.4)
PH, POC: 5.5
PLATELET # BLD: 318 K/UL (ref 135–450)
PMV BLD AUTO: 7.4 FL (ref 5–10.5)
POTASSIUM SERPL-SCNC: 4.5 MMOL/L (ref 3.5–5.1)
PROTEIN, POC: NORMAL
RBC # BLD: 4.28 M/UL (ref 4–5.2)
SODIUM BLD-SCNC: 142 MMOL/L (ref 136–145)
SPECIFIC GRAVITY, POC: NORMAL
TOTAL PROTEIN: 7.1 G/DL (ref 6.4–8.2)
UROBILINOGEN, POC: 0.2
WBC # BLD: 5.4 K/UL (ref 4–11)

## 2022-09-21 PROCEDURE — 1036F TOBACCO NON-USER: CPT

## 2022-09-21 PROCEDURE — 81002 URINALYSIS NONAUTO W/O SCOPE: CPT

## 2022-09-21 PROCEDURE — G8420 CALC BMI NORM PARAMETERS: HCPCS

## 2022-09-21 PROCEDURE — G8427 DOCREV CUR MEDS BY ELIG CLIN: HCPCS

## 2022-09-21 PROCEDURE — 36415 COLL VENOUS BLD VENIPUNCTURE: CPT

## 2022-09-21 PROCEDURE — 99214 OFFICE O/P EST MOD 30 MIN: CPT

## 2022-09-21 RX ORDER — FAMOTIDINE 20 MG/1
20 TABLET, FILM COATED ORAL 2 TIMES DAILY
Qty: 60 TABLET | Refills: 3 | Status: SHIPPED | OUTPATIENT
Start: 2022-09-21

## 2022-09-21 ASSESSMENT — PATIENT HEALTH QUESTIONNAIRE - PHQ9
5. POOR APPETITE OR OVEREATING: 3
SUM OF ALL RESPONSES TO PHQ QUESTIONS 1-9: 15
6. FEELING BAD ABOUT YOURSELF - OR THAT YOU ARE A FAILURE OR HAVE LET YOURSELF OR YOUR FAMILY DOWN: 3
SUM OF ALL RESPONSES TO PHQ9 QUESTIONS 1 & 2: 3
7. TROUBLE CONCENTRATING ON THINGS, SUCH AS READING THE NEWSPAPER OR WATCHING TELEVISION: 0
2. FEELING DOWN, DEPRESSED OR HOPELESS: 2
8. MOVING OR SPEAKING SO SLOWLY THAT OTHER PEOPLE COULD HAVE NOTICED. OR THE OPPOSITE, BEING SO FIGETY OR RESTLESS THAT YOU HAVE BEEN MOVING AROUND A LOT MORE THAN USUAL: 0
1. LITTLE INTEREST OR PLEASURE IN DOING THINGS: 1
SUM OF ALL RESPONSES TO PHQ QUESTIONS 1-9: 15
10. IF YOU CHECKED OFF ANY PROBLEMS, HOW DIFFICULT HAVE THESE PROBLEMS MADE IT FOR YOU TO DO YOUR WORK, TAKE CARE OF THINGS AT HOME, OR GET ALONG WITH OTHER PEOPLE: 2
SUM OF ALL RESPONSES TO PHQ QUESTIONS 1-9: 15
9. THOUGHTS THAT YOU WOULD BE BETTER OFF DEAD, OR OF HURTING YOURSELF: 0
SUM OF ALL RESPONSES TO PHQ QUESTIONS 1-9: 15
3. TROUBLE FALLING OR STAYING ASLEEP: 3
4. FEELING TIRED OR HAVING LITTLE ENERGY: 3

## 2022-09-21 ASSESSMENT — ENCOUNTER SYMPTOMS
RECTAL PAIN: 0
ANAL BLEEDING: 0
ABDOMINAL DISTENTION: 0
NAUSEA: 0
DIARRHEA: 0
RESPIRATORY NEGATIVE: 1
ABDOMINAL PAIN: 1
VOMITING: 0
CONSTIPATION: 0
BLOOD IN STOOL: 0

## 2022-09-21 NOTE — PROGRESS NOTES
Chief Complaint   Patient presents with    Abdominal Pain     Loss of appetite     Nausea       HPI:  Valentine Solorio is a 29 y.o. (: 1990) here today   for evaluation of loss of appetite, abd pain, nausea. Started x2 months ago after having child. Has been persistent and not gotten any better. Happening after every time she eats. Had Virgin Bars during pregnancy and was on Pepcid but not currently taking. Helped with [place] Bars when was taking. Virgin Bars is more noted end of the day but the belly pain is most all day. Belly pain is located usually upper/middle abd but can be generalized. Sometimes can cause her back to hurt. Is having BM's BID and are soft. Depression screen flagged today. Pt states has been an issue for a while and declines any further intervention at this time. Wants to manage on her own. Urinalysis in office pos for trace Leukocytes and small bilirubin. Patient's medications, allergies, past medical, surgical, social and family histories were reviewed and updated asappropriate. ROS:  Review of Systems   Constitutional:  Positive for appetite change. HENT: Negative. Respiratory: Negative. Cardiovascular: Negative. Gastrointestinal:  Positive for abdominal pain. Negative for abdominal distention, anal bleeding, blood in stool, constipation, diarrhea, nausea, rectal pain and vomiting. Genitourinary:  Negative for dysuria, flank pain, frequency, urgency and vaginal pain. Musculoskeletal: Negative. Skin: Negative. Neurological: Negative. Psychiatric/Behavioral:  Positive for dysphoric mood. The patient is nervous/anxious. Prior to Visit Medications    Medication Sig Taking?  Authorizing Provider   famotidine (PEPCID) 20 MG tablet Take 1 tablet by mouth 2 times daily Yes Merdawne Mediate, APRN - CNP   Doxylamine Succinate, Sleep, (SLEEP AID PO) Take by mouth Sleep 3 Yes Historical Provider, MD   ondansetron (ZOFRAN) 4 MG tablet Take 1 tablet by mouth

## 2022-09-22 DIAGNOSIS — Z3A.01 LESS THAN 8 WEEKS GESTATION OF PREGNANCY: Primary | ICD-10-CM

## 2022-09-22 DIAGNOSIS — Z3A.01 LESS THAN 8 WEEKS GESTATION OF PREGNANCY: ICD-10-CM

## 2022-09-22 LAB
FERRITIN: 34.1 NG/ML (ref 15–150)
IRON SATURATION: 20 % (ref 15–50)
IRON: 61 UG/DL (ref 37–145)
TOTAL IRON BINDING CAPACITY: 311 UG/DL (ref 260–445)

## 2022-09-23 LAB — URINE CULTURE, ROUTINE: NORMAL

## 2022-09-26 DIAGNOSIS — R10.84 GENERALIZED ABDOMINAL PAIN: Primary | ICD-10-CM

## 2022-09-26 DIAGNOSIS — K21.9 GASTROESOPHAGEAL REFLUX DISEASE, UNSPECIFIED WHETHER ESOPHAGITIS PRESENT: ICD-10-CM

## 2022-10-06 ENCOUNTER — PATIENT MESSAGE (OUTPATIENT)
Dept: FAMILY MEDICINE CLINIC | Age: 32
End: 2022-10-06

## 2022-10-06 RX ORDER — FLUCONAZOLE 150 MG/1
150 TABLET ORAL EVERY OTHER DAY
Qty: 3 TABLET | Refills: 0 | Status: SHIPPED | OUTPATIENT
Start: 2022-10-06 | End: 2022-10-11

## 2022-10-06 NOTE — TELEPHONE ENCOUNTER
From: Theo Hinkle  To: Dr. Adonis Villaseñor  Sent: 10/6/2022 2:57 PM EDT  Subject: Hello    I had an abscess tooth and was on antibiotics. I now have a yeast infection. I have started over the counter treatments but they havent been very effective yet. Is there anyway I can get the prescription that helps? If it is safe for breastfeeding.

## 2023-01-05 NOTE — TELEPHONE ENCOUNTER
Given she is breast-feeding, I would be hesitant to use a antifungal cream to the nipples. Would recommend nystatin liquid. Apply a small amount to the affected area 3 times daily.   Number 120 mL, no refill

## 2023-01-05 NOTE — TELEPHONE ENCOUNTER
----- Message from Angela Grimes LPN sent at 7/0/9824 11:00 AM EST -----  Subject: Message to Provider    QUESTIONS  Information for Provider? Baby was diagnosed with thrush  calling   because now mom needs medication for her breast to help clear it up she   really doesn't want to come in for appt please advise Pharmacy   [facility]   ---------------------------------------------------------------------------  --------------  [address]  [phone]; OK to leave message on voicemail  ---------------------------------------------------------------------------  --------------  SCRIPT ANSWERS  Relationship to Patient? Other  Representative Name? Nessa Romeo   Is the Representative on the appropriate HIPAA document in Epic?  Yes

## 2023-02-07 ENCOUNTER — TELEPHONE (OUTPATIENT)
Dept: FAMILY MEDICINE CLINIC | Age: 33
End: 2023-02-07

## 2023-02-07 DIAGNOSIS — G43.009 MIGRAINE WITHOUT AURA AND WITHOUT STATUS MIGRAINOSUS, NOT INTRACTABLE: ICD-10-CM

## 2023-02-07 RX ORDER — SUMATRIPTAN 50 MG/1
50 TABLET, FILM COATED ORAL
Qty: 9 TABLET | Refills: 2 | Status: SHIPPED | OUTPATIENT
Start: 2023-02-07 | End: 2023-02-07

## 2023-02-07 NOTE — TELEPHONE ENCOUNTER
Received a refill request from [facility] for immitrex appears refill sent, patient  informed

## 2023-02-07 NOTE — TELEPHONE ENCOUNTER
Pt called and had a tooth cut out end of week and now she [facility] she said you use to give her something for it and wanted to know if you could again.

## 2023-02-07 NOTE — TELEPHONE ENCOUNTER
I do not see a migraine medication on her chart. Please confirm what medication she is referring to. Okay to use Tylenol or Aleve. Stay well-hydrated. Excedrin Migraine may be an option as well.

## 2023-02-13 ENCOUNTER — FOLLOWUP TELEPHONE ENCOUNTER (OUTPATIENT)
Dept: PSYCHIATRY | Age: 33
End: 2023-02-13

## 2023-02-16 ENCOUNTER — HOSPITAL ENCOUNTER (OUTPATIENT)
Dept: PSYCHIATRY | Age: 33
Setting detail: THERAPIES SERIES
Discharge: HOME | End: 2023-02-16
Payer: COMMERCIAL

## 2023-02-16 PROCEDURE — 90791 PSYCH DIAGNOSTIC EVALUATION: CPT

## 2023-02-16 ASSESSMENT — ANXIETY QUESTIONNAIRES
2. NOT BEING ABLE TO STOP OR CONTROL WORRYING: 3
3. WORRYING TOO MUCH ABOUT DIFFERENT THINGS: 3
6. BECOMING EASILY ANNOYED OR IRRITABLE: 3
5. BEING SO RESTLESS THAT IT IS HARD TO SIT STILL: 1
1. FEELING NERVOUS, ANXIOUS, OR ON EDGE: 3
IF YOU CHECKED OFF ANY PROBLEMS ON THIS QUESTIONNAIRE, HOW DIFFICULT HAVE THESE PROBLEMS MADE IT FOR YOU TO DO YOUR WORK, TAKE CARE OF THINGS AT HOME, OR GET ALONG WITH OTHER PEOPLE: VERY DIFFICULT
GAD7 TOTAL SCORE: 19
7. FEELING AFRAID AS IF SOMETHING AWFUL MIGHT HAPPEN: 3
4. TROUBLE RELAXING: 3

## 2023-02-16 ASSESSMENT — PATIENT HEALTH QUESTIONNAIRE - PHQ9: SUM OF ALL RESPONSES TO PHQ QUESTIONS 1-9: 24

## 2023-02-16 ASSESSMENT — SLEEP AND FATIGUE QUESTIONNAIRES
AVERAGE NUMBER OF SLEEP HOURS: 3
DO YOU USE A SLEEP AID: YES
DO YOU HAVE DIFFICULTY SLEEPING: YES
SLEEP PATTERN: DIFFICULTY FALLING ASLEEP;DISTURBED/INTERRUPTED SLEEP;RESTLESSNESS;INSOMNIA
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
## 2023-02-21 ENCOUNTER — HOSPITAL ENCOUNTER (OUTPATIENT)
Dept: PSYCHIATRY | Age: 33
Setting detail: THERAPIES SERIES
Discharge: HOME | End: 2023-02-21
Payer: COMMERCIAL

## 2023-02-21 VITALS
TEMPERATURE: 97.5 F | OXYGEN SATURATION: 99 % | DIASTOLIC BLOOD PRESSURE: 72 MMHG | RESPIRATION RATE: 16 BRPM | HEART RATE: 72 BPM | SYSTOLIC BLOOD PRESSURE: 101 MMHG

## 2023-02-21 DIAGNOSIS — F43.23 ADJUSTMENT DISORDER WITH MIXED ANXIETY AND DEPRESSED MOOD: Primary | ICD-10-CM

## 2023-02-21 PROBLEM — F39 MOOD DISORDER (HCC): Status: ACTIVE | Noted: 2019-01-02

## 2023-02-21 PROCEDURE — 99215 OFFICE O/P EST HI 40 MIN: CPT

## 2023-02-21 PROCEDURE — H2020 THER BEHAV SVC, PER DIEM: HCPCS

## 2023-02-21 RX ORDER — QUETIAPINE FUMARATE 50 MG/1
50 TABLET, EXTENDED RELEASE ORAL NIGHTLY
Qty: 30 TABLET | Refills: 0 | Status: SHIPPED | OUTPATIENT
Start: 2023-02-21

## 2023-02-21 NOTE — GROUP NOTE
Group Therapy Note    Date: 2/21/2023    Group Start Time: 10:00 AM  Group End Time: 11:00 AM  Group Topic: Psychoeducation    [facility]    Jose Crystal RN        Group Therapy Note    Patients participated in a group discussing why compliments can be difficult for trauma survivors to accept. Discussed how to give true and specific compliments in a healthy way and changing negative self-talk to be able to accept compliments from others. Attendees: 5       Patient's Goal:  To understand why compliments may make situations uncomfortable and how to reorganize that thought process to a positive. Notes:  Pt was able to give her peers and herself a true and specific compliment. Each compliment was read aloud to allow the patient to accept those compliments and discuss any negative thoughts that came to mind regarding the compliments. Status After Intervention:  Improved    Participation Level:  Active Listener and Interactive    Participation Quality: Appropriate      Speech:  normal      Thought Process/Content: Logical  Linear      Affective Functioning: Congruent      Mood: euthymic      Level of consciousness:  Oriented x4      Response to Learning: Able to verbalize current knowledge/experience, Able to verbalize/acknowledge new learning, Able to retain information, and Capable of insight      Endings: None Reported    Modes of Intervention: Education, Support, and Socialization      Discipline Responsible: Registered Nurse      Signature:  Jose Crystal RN

## 2023-02-21 NOTE — H&P
INITIAL PSYCHIATRIC HISTORY AND PHYSICAL      Patient name: Clem Mcdaniels  Admit date: 2/21/2023  Today's date: 2/21/2023           CC: Mood Disorder    HPI:    Patient is a 29 y.o. female who presents  for her first day of IOP. Pt was alert and oriented, memory intact. Pt's speech was pressured, she endorsed anxiety, trouble sleeping at night, feeling overwhelmed with life, and passive thoughts of SI. She reports increased anger when she is too anxious, often having an \"outburst\" and then feeling better. Pt states that her main goal in life is to be a good mother, and she never wants to do anything to put her children (ages 8, 6, 9, 21 months and 7 months) at risk of following in her foot steps mentally. Pt has been in IOP before, but quit after she felt it was not a good fit for her, and \"clashing\" with the counselor at the time. Pt states she has been on a number of different medications, but they all tend to make her worse/more anxious. Pt is willing to try a new medication. She reports not having a lot of support from her , and feels that he is too passive, when he should be advocating for her and getting more people to help her out. Pt feels people are taking her issues seriously sometimes. She is looking forward to giving this program a chance, and willing to start Seroquel ER 50mg HS for depression/anxiety/sleep. Pt denies all SI/HI and AVH at this time. PAST PSYCHIATRIC HISTORY:  Hx of PTSD, anxiety, depression. Last suicide attempt was 4 years ago, no prior hospitalizations.       FAMILY PSYCHIATRIC HISTORY:     Family History   Problem Relation Age of Onset    Kidney Disease Mother     Crohn's Disease Father     Urolithiasis Father     Urolithiasis Sister     Diabetes Maternal Grandmother     Heart Disease Maternal Grandmother     Diabetes Paternal Grandmother     Diabetes Paternal Grandfather     Heart Disease Paternal Grandfather        ALLERGIES:    Allergies Allergen Reactions    Hydromorphone Hcl Anaphylaxis and Shortness Of Breath    Sertraline Anxiety, Other (See Comments) and Shortness Of Breath     More frequent panic attacks. RWK    Metoclopramide     Dilaudid [Hydromorphone Hcl] Rash       CURRENT MEDICATIONS:        PAST MEDICAL HISTORY:    Past Medical History:   Diagnosis Date    Anemia     Anxiety     Depression     no meds    History of recurrent UTI (urinary tract infection)     Infections of kidney     Kidney stone 2011    Miscarriage     Other disorders of kidney and ureter     2 ureters on R side    Status post cystoscopy 13        PAST SURGICAL HISTORY:    Past Surgical History:   Procedure Laterality Date     SECTION  2014    X 2;  & ,      CYSTOSCOPY  [id number]    [facility]    EYE SURGERY      EYE SURGERY         PROBLEM LIST:  Active Problems:    * No active hospital problems. *  Resolved Problems:    * No resolved hospital problems.  *       SOCIAL HISTORY:  Social History     Socioeconomic History    Marital status:      Spouse name: Not on file    Number of children: Not on file    Years of education: Not on file    Highest education level: Not on file   Occupational History    Occupation: homemaker   Tobacco Use    Smoking status: Never    Smokeless tobacco: Never   Vaping Use    Vaping Use: Never used   Substance and Sexual Activity    Alcohol use: No    Drug use: No    Sexual activity: Yes     Partners: Male   Other Topics Concern    Not on file   Social History Narrative    Not on file     Social Determinants of Health     Financial Resource Strain: Not on file   Food Insecurity: Not on file   Transportation Needs: Not on file   Physical Activity: Not on file   Stress: Not on file   Social Connections: Not on file   Intimate Partner Violence: Not on file   Housing Stability: Not on file       OBJECTIVE:   Vitals:    23 0830   BP: 101/72   Pulse: 72   Resp: 16   Temp: 97.5 °F (36.4 °C)   SpO2: 99% REVIEW OF SYSTEMS:   Reports no current cardiovascular, respiratory, gastrointestinal, genitourinary,   integumentary, neurological, musculoskeletal, or immunological symptoms today. PSYCHIATRIC:  See HPI above     PSYCHIATRIC EXAMINATION / MENTAL STATUS EXAM:     CONSTITUTIONAL:    Vitals:    Blood pressure 101/72, pulse 72, temperature 97.5 °F (36.4 °C), temperature source Oral, resp. rate 16, SpO2 99 %, not currently breastfeeding.   General appearance:  [x]  appears age, []  appears older than stated age,     [x]  adequately dressed and groomed, [] disheveled,                [x]  in no acute distress, [] appears mildly distressed,      []  Other:      MUSCULOSKELETAL:   Gait:   [x] normal, [] antalgic, [] unsteady, [] in bed, gait not evaluated   Station:  [x] erect, [] sitting, [] recumbent, [] other        Strength/tone:  [x] muscle strength and tone appear consistent with age and condition     [] atrophy      [] abnormal movements  PSYCHIATRIC:    Relatedness:  [x] cooperative, [] guarded, [] indifferent, [] hostile,      [] sedated  Speech:  [] normal prosody, [x] pressured, [] decreased volume,    [] slurred, [] halting, [] slowed, [] other     [] echolalia, [] incoherent, [] stuttering   Eye contact:  [x] direct, [] avoidant, [] intense  Kinetics:  [] normal, [x] increased, [] decreased  Mood:   [] stable, [] depressed, [x] anxious, [] irritable,     [] labile  Affect:   [] normal range, [] constricted, [] depressed, [x] anxious,     [] angry, [] blunted  Hallucinations  [x] denies, [] auditory,  [] visual,  [] olfactory, [] tactile  Delusions  [x] none, [] grandiose,  [] jealous,  [] persecutory,  [] somatic,     [] bizarre  Preoccupations  [x] none, [] violence, [] obsessions, [] other     Suicidal ideation  [x] denies, [] endorses  Homicidal ideation [x] denies, [] endorses  Thought process: [x] logical, [] circumstantial, [] tangential, [] SERGEI,     [] simplistic, [] disorganized  Associations:  [x] logical and coherent, [] loosening, [] disorganized  Insight:   [] good, [x] fair, [] poor  Judgment:  [] good, [x] fair, [] poor  Attention and concentration:     [x] intact, [] limited, [] able to focus on interview,     [] grossly impaired  Orientation:  [x] person, place, time, situation     [] disoriented to:     Memory:  Remote memory [x] intact, [] impaired     Recent memory  [x] intact, [] impaired          IMPRESSION    Active Problems:    * No active hospital problems. *  Resolved Problems:    * No resolved hospital problems. *       ______      Medications  Current Outpatient Medications   Medication Sig Dispense Refill    QUEtiapine (SEROQUEL XR) 50 MG extended release tablet Take 1 tablet by mouth nightly 30 tablet 0    SUMAtriptan (IMITREX) 50 MG tablet TAKE 1 TABLET BY MOUTH ONCE AS NEEDED FOR MIGRAINE 9 tablet 2     No current facility-administered medications for this encounter. Spent > 70 minutes evaluating and treating patient, more than 50 % of that time was spent counseling the patient on their symptoms, treatment, and expected goals. ______  PLAN   1. Complete IOP  2. Reviewed treatment plan with patient including medication risks, benefits, side effects. Obtained informed consent for treatment.      Precious Haywood, ROMINAP-BC

## 2023-02-21 NOTE — GROUP NOTE
Group Therapy Note    Date: 2/21/2023    Group Start Time:  8:30 AM  Group End Time:  9:45 AM  Group Topic: Psychotherapy    MARZ DAVID Gordon        Group Therapy Note    Group members will use structure of psychotherapy to explore thoughts/feelings/behaviors and their impact on self, relationships, and engagement with surroundings. Attendees: 5       Notes:  Ben Fontana came into group hyperverbal with pressured speech. She stated that she attempted treatment in [facility] 4 years ago but quit due to personality conflicts with  staff members. She set intention to be \"present and attentive\" and get comfortable in group milieu. Josue shared that her reason for admission to [facility] is because \"I don't enjoy being a parent anymore\". She reports feeling like shes just trying to survive every day, waking up in a panic every day. She stated that she is experiencing \"decision fatigue\" in raising 5 children while also feeling limited in her personal autonomy. She discussed with peers that she uses medical marijuana to reduce anxiety and symptoms of agitation in relationships and interpersonal conflict. Status After Intervention:  Improved    Participation Level:  Active Listener, Interactive, and Monopolizing    Participation Quality: Sharing, Supportive, and Intrusive      Speech:  pressured      Thought Process/Content: Flight of ideas      Affective Functioning: Exaggerated      Mood: elevated      Level of consciousness:  Attentive      Response to Learning: Capable of insight and Progressing to goal      Endings: None Reported    Modes of Intervention: Support, Socialization, Exploration, Clarifying, and Problem-solving      Discipline Responsible: Psychoeducational Specialist      Signature:  Taqueria Alfaro, MM, MT-BC

## 2023-02-21 NOTE — GROUP NOTE
Group Therapy Note    Date: 2/21/2023    Group Start Time:  1:15 PM  Group End Time:  2:15 PM  Group Topic: Activity    [facility] [address]        Group Therapy Note    Attendees: 5    Facilitator led a group activity on grounding and being present in the hear and now. Patients and facilitator went on a short outside walk. Patients were instructed to find and take only one picture of a beautiful item they observe on the walk. Whilst on the hospital walking trail, patients took a few minutes to practice grounding techniques, like observing the five senses and deep breathing. After returning to the therapy room, patients shared their photos and discussed the process of finding a beautiful item. Patients collectively acknowledged that the walk was less that ideal due to the slight breeze, litter, and dormant trees but noted they were all still able to find and take a picture because they were focused on the objective. Patients explored how they can apply the same attitude to their own lives. Patients also discussed how grounding helped them stay focused in the moment and appreciate what they had in front of them. Notes:  Patient was engaged in the group activity and was actively involved in the discussion. Patient took a picture of a the sun in the renae. Patient expressed that it was difficult to find beauty due to the litter and surrounding construction. Patient did acknowledge that even in the midst of the distractions she was able to still find something to value and focus on. Patients then explored how this approach applies to them in their own lives. Status After Intervention:  Improved    Participation Level:  Active Listener and Interactive    Participation Quality: Appropriate, Attentive, and Sharing      Speech:  normal      Thought Process/Content: Logical  Linear      Affective Functioning: Congruent      Mood: euthymic      Level of consciousness:  Alert, Oriented x4, and Attentive      Response to Learning: Capable of insight, Able to change behavior, and Progressing to goal      Endings: None Reported    Modes of Intervention: Socialization and Activity      Discipline Responsible: /Counselor      Signature:  CHELE Robertson

## 2023-02-21 NOTE — GROUP NOTE
Group Therapy Note    Date: 2/21/2023    Group Start Time: 11:15 AM  Group End Time: 12:15 PM  Group Topic: Psychoeducation    [facility]    CB Kamara        Group Therapy Note  Clinician introduce the group to cognitive dissonance and how the inconsistency of their thoughts create the discomfort they feel when the thoughts contradict each other. Group members likened it to the difference of knowing a truth and believing a truth. Attendees: 5       Patient's Goal:      Notes:  Patient was cooperative and fully engaged in the group discussion and activity. Patient participated in the discussion asking clarifying questions and providing supportive statements to other group members. Status After Intervention:  Improved    Participation Level:  Active Listener and Interactive    Participation Quality: Appropriate and Attentive      Speech:  normal      Thought Process/Content: Logical      Affective Functioning: Congruent      Mood: anxious      Level of consciousness:  Attentive      Response to Learning: Able to retain information      Endings: None Reported    Modes of Intervention: Education, Support, and Activity      Discipline Responsible: /Counselor      Signature:  CB Kamara

## 2023-02-23 ENCOUNTER — HOSPITAL ENCOUNTER (OUTPATIENT)
Dept: PSYCHIATRY | Age: 33
Setting detail: THERAPIES SERIES
Discharge: HOME | End: 2023-02-23
Payer: COMMERCIAL

## 2023-02-23 DIAGNOSIS — F43.23 ADJUSTMENT DISORDER WITH MIXED ANXIETY AND DEPRESSED MOOD: Primary | ICD-10-CM

## 2023-02-23 PROCEDURE — 90853 GROUP PSYCHOTHERAPY: CPT

## 2023-02-23 PROCEDURE — H2020 THER BEHAV SVC, PER DIEM: HCPCS

## 2023-02-23 NOTE — GROUP NOTE
Group Therapy Note    Date: 2/23/2023    Group Start Time:  1:15 PM  Group End Time:  2:15 PM  Group Topic: Psychoeducation    MARZ DAVID Gordon        Group Therapy Note    Topic: Mindfulness education    Group members discussed mindfulness in theory, practices physical and mental methods of mindfulness while incorporating sensory tools (temperature, taste, sight). Attendees: 4       Notes: This pt was present and actively engaged across interventions. Verbalized understanding of topics addressed and materials provided. No needs expressed at close of session. Status After Intervention:  Improved    Participation Level:  Active Listener and Interactive    Participation Quality: Sharing and Supportive      Speech:  normal      Thought Process/Content: Logical      Affective Functioning: Congruent      Mood: euthymic      Level of consciousness:  Alert and Oriented x4      Response to Learning: Capable of insight and Progressing to goal      Endings: None Reported    Modes of Intervention: Education, Support, Exploration, Activity, and Media      Discipline Responsible: Psychoeducational Specialist      Signature:  Ferol Litten, MM, MT-BC

## 2023-02-23 NOTE — GROUP NOTE
Group Therapy Note    Date: 2/23/2023    Group Start Time: 11:15 AM  Group End Time: 12:15 PM  Group Topic: Psychoeducation    [facility]    CB Boothe        Group Therapy Note  Clinician introduced the group to cynthia versus guilt. Clinician provided a handout on toxic guilt for the group members to take home and read on their own time. The clinician and group members defined guilt as their conscience telling them they have done something wrong. Clinician had the members define cynthia and the members came to the conclusion that they need to have balance between cynthia and guilt. Clinician provided quotes for both guilt and cynthia and members wrote the quotes in their notebooks. Attendees: 4       Patient's Goal:      Notes:  Patient was cooperative and fully engaged in the group discussion and activity. Patient provided her definition of cynthia and guilt and took notes in her journal. She was able to ask clarifying questions and provided support to other group members when they were processing the information. Status After Intervention:  Improved    Participation Level:  Active Listener and Interactive    Participation Quality: Appropriate, Attentive, Sharing, and Supportive      Speech:  normal      Thought Process/Content: Logical      Affective Functioning: Congruent      Mood: euthymic      Level of consciousness:  Attentive      Response to Learning: Able to verbalize/acknowledge new learning      Endings: None Reported    Modes of Intervention: Education, Support, Exploration, and Activity      Discipline Responsible: /Counselor      Signature:  CB Boothe

## 2023-02-23 NOTE — GROUP NOTE
Group Therapy Note    Date: 2/23/2023    Group Start Time: 10:00 AM  Group End Time: 11:00 AM  Group Topic: Psychoeducation    TREVOR Miranda        Group Therapy Note    Attendees: 4    Facilitator led a group discussion on masking. Patients explored what masking is and how it applies to them. Patients discussed the different masks they wear and distinguished when masks are used and the reasoning for them. Facilitator presented patients with the outline of a mask. Patients cut out the masks and were instructed to write the behaviors, characteristics, traits, or versions of self that were present to the outside world. Patients were then encouraged to flip the mask over and consider the versions of self that they hide from others. Patients then processed the difference between the two sides. Notes:  Patient engaged fully in the group discussion and participated fully in the mask activity. The patient was able to identify the reason for wearing a mask and processed the difference between the person being betrayed to the world and the person underneath. Patient expressed discomfort with the activity stating \"writing it down makes it real\". Patient acknowledged that facing the self underneath the mask was difficult but supports making change. Status After Intervention:  Improved    Participation Level:  Active Listener and Interactive    Participation Quality: Appropriate, Attentive, and Sharing      Speech:  normal      Thought Process/Content: Logical  Linear      Affective Functioning: Congruent      Mood: euthymic      Level of consciousness:  Alert, Oriented x4, and Attentive      Response to Learning: Able to verbalize current knowledge/experience, Able to verbalize/acknowledge new learning, Capable of insight, Able to change behavior, and Progressing to goal      Endings: None Reported    Modes of Intervention: Exploration and Activity      Discipline Responsible: /Counselor      Signature:  CHELE Reid

## 2023-02-23 NOTE — GROUP NOTE
Group Therapy Note    Date: 2/23/2023    Group Start Time:  8:30 AM  Group End Time:  9:45 AM  Group Topic: Psychotherapy    TREVOR Gordon        Group Therapy Note    Group members participated in psychotherapy dialogue exploring thoughts/feelings/behaviors and their impact on the self, relationships, and engagement with surroundings. Attendees: 4       Notes:  Damion Johnson reports that she is feeling \"in-between\" while transitioning to new medication regimen. She shared with peers that her significant other has continued to demonstration a lack of support as she continues working on self-improvement, stating that he has been unemployed for over a year while she is primary caregiver for their 5 children, including 3under 3years old. She shared that when she expresses her feelings toward him he gets defensive leading her to lash out and become mean. She processed with peers the significance of these interactions occurring in the presence of their children and the examples they are setting. She shared that she feels like she is fighting for her perspective with minimal engagement from support system. She also reflected on the roles of acceptance and boundaries as it relates to her significant other, sharing that she is past the point of accepting his faults while betraying her personal boundaries. Status After Intervention:  Improved    Participation Level:  Active Listener and Interactive    Participation Quality: Sharing and Supportive      Speech:  normal      Thought Process/Content: Logical      Affective Functioning: Congruent      Mood: euthymic      Level of consciousness:  Alert and Oriented x4      Response to Learning: Able to verbalize current knowledge/experience, Able to verbalize/acknowledge new learning, and Progressing to goal      Endings: None Reported    Modes of Intervention: Support, Socialization, Exploration, Clarifying, and Problem-solving      Discipline Responsible: Psychoeducational Specialist      Signature:  Mara Sanders, NADEGE, MT-BC

## 2023-02-28 ENCOUNTER — HOSPITAL ENCOUNTER (OUTPATIENT)
Dept: PSYCHIATRY | Age: 33
Setting detail: THERAPIES SERIES
Discharge: HOME | End: 2023-02-28
Payer: COMMERCIAL

## 2023-02-28 DIAGNOSIS — F43.23 ADJUSTMENT DISORDER WITH MIXED ANXIETY AND DEPRESSED MOOD: Primary | ICD-10-CM

## 2023-02-28 PROCEDURE — H2020 THER BEHAV SVC, PER DIEM: HCPCS

## 2023-02-28 NOTE — GROUP NOTE
Group Therapy Note    Date: 2/28/2023    Group Start Time:  1:15 PM  Group End Time:  2:15 PM  Group Topic: Psychoeducation    TREVOR Gordon        Group Therapy Note    Topic: Challenging Negative Self-Talk    Attendees: 3       Notes: Active engagement in group discussions, reflecting on externalization practices, ways to challenge negative self-talk through thought-stopping, reality-orientation, and sources for methods of self-talk. Status After Intervention:  Improved    Participation Level:  Active Listener and Interactive    Participation Quality: Sharing and Supportive      Speech:  normal      Thought Process/Content: Logical      Affective Functioning: Congruent      Mood: euthymic      Level of consciousness:  Alert and Oriented x4      Response to Learning: Capable of insight and Progressing to goal      Endings: None Reported    Modes of Intervention: Education, Support, Exploration, Problem-solving, and Reality-testing      Discipline Responsible: Psychoeducational Specialist      Signature:  Nahed Hanna MM, MT-BC

## 2023-02-28 NOTE — GROUP NOTE
Group Therapy Note    Date: 2/28/2023    Group Start Time: 11:15 AM  Group End Time: 12:12 PM  Group Topic: Psychotherapy    [facility]    CB Neumann        Group Therapy Note  Clinician introduced the members to the 5 Love languages. Attendees: 3       Patient's Goal:      Notes:  Patient was cooperative and fully engaged in the group discussion and activity. Patient shared her assessment of the love language for her and her spouse. Patient discussed the differences between them and stated it makes complete sense to her after doing the strengths assessment and the love language assessment. Status After Intervention:  Improved    Participation Level:  Active Listener and Interactive    Participation Quality: Appropriate, Attentive, Sharing, and Supportive      Speech:  normal      Thought Process/Content: Logical      Affective Functioning: Congruent      Mood: euthymic      Level of consciousness:  Attentive      Response to Learning: Able to verbalize/acknowledge new learning      Endings: None Reported    Modes of Intervention: Education, Support, Exploration, and Activity      Discipline Responsible: /Counselor      Signature:  CB Neumann

## 2023-02-28 NOTE — GROUP NOTE
Group Therapy Note    Date: 2/28/2023    Group Start Time:  8:30 AM  Group End Time:  9:45 AM  Group Topic: Psychotherapy    TREVOR Gordon        Group Therapy Note    Group members will use structure of psychotherapy to explore thoughts/feelings/behaviors and their impacts on self, relationships, and engagement with surroundings. Attendees: 3       Notes:  Reyes Evener reports her thoughts as \"It is what it is\", sharing that she has used escapism in her home, only having alone time when taking baths. Her body feels \"exhausted\" with fear that her medications are not working appropriately leading to poor sleep hygiene over last week. She shared with peers her current experiences of feeling guilty for prioritizing self over her partner and 5 children, reporting that she feels \"shut down\" when making these choices that are often misunderstood. Status After Intervention:  Improved    Participation Level:  Active Listener    Participation Quality: Sharing and Supportive      Speech:  normal      Thought Process/Content: Logical      Affective Functioning: Congruent      Mood: euthymic      Level of consciousness:  Alert and Attentive      Response to Learning: Progressing to goal      Endings: None Reported    Modes of Intervention: Support, Socialization, Exploration, Clarifying, and Problem-solving      Discipline Responsible: Psychoeducational Specialist      Signature:  Nicki Bautista MM, MT-BC

## 2023-02-28 NOTE — GROUP NOTE
Group Therapy Note    Date: 2/28/2023    Group Start Time: 10:00 AM  Group End Time: 11:00 AM  Group Topic: Psychoeducation    [facility]    CB Deleon        Group Therapy Note  Clinician introduced the group members to the VIA 24 Character strengths self assessment. Patient took the 80 questionnaire where it ranks the strengths in the order one is using them. Patient reviewed their results as a group and discussed how they felt they were ranked appropriately. Patients then went over the things that they value and how they were congruent with their strengths. Attendees: 3       Patient's Goal:      Notes:  Patient was cooperative and took the self assessment. Patient discussed her strengths and values with the group. She was able to accept the order of the results and discuss how they were congruent with her values. Status After Intervention:  Improved    Participation Level:  Active Listener and Interactive    Participation Quality: Appropriate, Attentive, Sharing, and Supportive      Speech:  normal      Thought Process/Content: Logical      Affective Functioning: Congruent      Mood: anxious      Level of consciousness:  Attentive      Response to Learning: Able to verbalize/acknowledge new learning      Endings: None Reported    Modes of Intervention: Education, Support, Exploration, and Activity      Discipline Responsible: /Counselor      Signature:  CB Deleon

## 2023-03-07 ENCOUNTER — HOSPITAL ENCOUNTER (OUTPATIENT)
Dept: PSYCHIATRY | Age: 33
Setting detail: THERAPIES SERIES
Discharge: HOME | End: 2023-03-07
Payer: COMMERCIAL

## 2023-03-07 DIAGNOSIS — F39 MOOD DISORDER (HCC): Primary | ICD-10-CM

## 2023-03-07 PROCEDURE — H2020 THER BEHAV SVC, PER DIEM: HCPCS

## 2023-03-07 NOTE — GROUP NOTE
Group Therapy Note    Date: 3/7/2023    Group Start Time: 10:00 AM  Group End Time: 11:00 AM  Group Topic: Psychoeducation    MARZ DAVID Miranda        Group Therapy Note    Attendees: 3    Facilitator led a group discussion on attachment theory and how it applies to relationships. Patients explored the four different attachment styles (secure, anxious, avoidant, and disorganized), and the different behaviors that are exhibited in each. After developing an understanding of attachment styles, patients reflected on their current style and how to move towards secure attachments. Notes:  Patient engaged fully in group discussion and was able to identify a dominant attachment style and how the associated behavior impact close relationships. Status After Intervention:  Improved    Participation Level:  Active Listener, monopolized    Participation Quality: Sharing     Speech:  normal      Thought Process/Content: Logical  Linear      Affective Functioning: Congruent       Mood: euthymic      Level of consciousness:  Alert and Oriented X4       Response to Learning: Able to verbalize current knowledge/experience, Capable of insight, Able to change behavior, and Progressing to goal      Endings: None Reported    Modes of Intervention: Education and Exploration      Discipline Responsible: /Counselor      Signature:  CHELE Guthrie
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
## 2023-03-07 NOTE — GROUP NOTE
Group Therapy Note    Date: 3/7/2023    Group Start Time: 11:15 AM  Group End Time: 12:15 PM  Group Topic: Psychoeducation    [facility]    ALFREDO Ochoa        Group Therapy Note    Attendees: 3       Patient's Goal:  read a story  together on The deann present \"living life fully. \" Discussed the meaning of living in the present moment. Pt's asked to apply to their lives. Notes:  pt participated in group discussion and was able to apply to herself. Pt reported that she has been working on being in the present moment.      Status After Intervention:  Improved    Participation Level: Interactive and Monopolizing    Participation Quality: Attentive and Sharing      Speech:  pressured      Thought Process/Content: Logical      Affective Functioning: Exaggerated      Mood: elevated      Level of consciousness:  Alert      Response to Learning: Able to verbalize current knowledge/experience      Endings: None Reported    Modes of Intervention: Education, Support, Socialization, Exploration, and Clarifying      Discipline Responsible: /Counselor      Signature:  Sueann Buerger, LISW

## 2023-03-14 ENCOUNTER — HOSPITAL ENCOUNTER (OUTPATIENT)
Dept: PSYCHIATRY | Age: 33
Setting detail: THERAPIES SERIES
Discharge: HOME | End: 2023-03-14
Payer: COMMERCIAL

## 2023-03-14 NOTE — PROGRESS NOTES
Outpatient Treatment Team Progress Note  Date of Review: 3/14/2023        Multidisciplinary Outpatient Treatment Team met to discuss and review patient's progress in group and individual therapy sessions. Presenting Problem: Mood Disorder      Patient's Current Treatment Status: Pt administratively discharged from the program as of today due today 3 absences.      Treatment Start Date: 2/21/2023     Tentative Discharge Date: 3/30/2023

## 2023-03-14 NOTE — BH NOTE
Pt administratively discharged from [facility] due to absences on 3/2, 3/9, and 3/14. Writer notified pt and mailed outpatient resources.

## 2023-03-16 ENCOUNTER — APPOINTMENT (OUTPATIENT)
Dept: PSYCHIATRY | Age: 33
End: 2023-03-16
Payer: COMMERCIAL

## 2023-03-21 ENCOUNTER — APPOINTMENT (OUTPATIENT)
Dept: PSYCHIATRY | Age: 33
End: 2023-03-21
Payer: COMMERCIAL

## 2023-03-23 ENCOUNTER — APPOINTMENT (OUTPATIENT)
Dept: PSYCHIATRY | Age: 33
End: 2023-03-23
Payer: COMMERCIAL

## 2023-03-24 ENCOUNTER — FOLLOWUP TELEPHONE ENCOUNTER (OUTPATIENT)
Dept: PSYCHIATRY | Age: 33
End: 2023-03-24

## 2023-03-24 NOTE — TELEPHONE ENCOUNTER
Pt requesting refill on Seroquel 50 mg HS. Dr. Kelsie Martinez approved. Writer called and instructed pt to get connected with outpatient provider for future refills. Resources were mailed at time of DC from [facility].

## 2023-03-28 ENCOUNTER — APPOINTMENT (OUTPATIENT)
Dept: PSYCHIATRY | Age: 33
End: 2023-03-28
Payer: COMMERCIAL

## 2023-03-30 ENCOUNTER — APPOINTMENT (OUTPATIENT)
Dept: PSYCHIATRY | Age: 33
End: 2023-03-30
Payer: COMMERCIAL

## 2023-12-25 ENCOUNTER — HOSPITAL ENCOUNTER (INPATIENT)
Age: 33
LOS: 1 days | Discharge: HOME | DRG: 755 | End: 2023-12-26
Attending: PSYCHIATRY & NEUROLOGY | Admitting: PSYCHIATRY & NEUROLOGY
Payer: COMMERCIAL

## 2023-12-25 DIAGNOSIS — F41.1 ANXIETY STATE: ICD-10-CM

## 2023-12-25 DIAGNOSIS — R45.851 SUICIDAL IDEATION: Primary | ICD-10-CM

## 2023-12-25 PROBLEM — F32.A DEPRESSION, UNSPECIFIED: Status: ACTIVE | Noted: 2023-12-25

## 2023-12-25 LAB
ALBUMIN SERPL-MCNC: 5 G/DL (ref 3.4–5)
ALBUMIN/GLOB SERPL: 1.6 {RATIO} (ref 1.1–2.2)
ALP SERPL-CCNC: 59 U/L (ref 40–129)
ALT SERPL-CCNC: 8 U/L (ref 10–40)
AMORPH SED URNS QL MICRO: ABNORMAL /HPF
AMPHETAMINES UR QL SCN>1000 NG/ML: ABNORMAL
ANION GAP SERPL CALCULATED.3IONS-SCNC: 10 MMOL/L (ref 3–16)
APAP SERPL-MCNC: 16 UG/ML (ref 10–30)
AST SERPL-CCNC: 14 U/L (ref 15–37)
BACTERIA URNS QL MICRO: ABNORMAL /HPF
BARBITURATES UR QL SCN>200 NG/ML: ABNORMAL
BASOPHILS # BLD: 0 K/UL (ref 0–0.2)
BASOPHILS NFR BLD: 0.3 %
BENZODIAZ UR QL SCN>200 NG/ML: ABNORMAL
BILIRUB SERPL-MCNC: 0.3 MG/DL (ref 0–1)
BILIRUB UR QL STRIP.AUTO: ABNORMAL
BUN SERPL-MCNC: 17 MG/DL (ref 7–20)
CALCIUM SERPL-MCNC: 8.3 MG/DL (ref 8.3–10.6)
CANNABINOIDS UR QL SCN>50 NG/ML: POSITIVE
CHLORIDE SERPL-SCNC: 107 MMOL/L (ref 99–110)
CLARITY UR: CLEAR
CO2 SERPL-SCNC: 25 MMOL/L (ref 21–32)
COCAINE UR QL SCN: ABNORMAL
COLOR UR: YELLOW
CREAT SERPL-MCNC: 0.6 MG/DL (ref 0.6–1.1)
DEPRECATED RDW RBC AUTO: 13.6 % (ref 12.4–15.4)
DRUG SCREEN COMMENT UR-IMP: ABNORMAL
EOSINOPHIL # BLD: 0 K/UL (ref 0–0.6)
EOSINOPHIL NFR BLD: 0.4 %
EPI CELLS #/AREA URNS HPF: ABNORMAL /HPF (ref 0–5)
ETHANOLAMINE SERPL-MCNC: NORMAL MG/DL (ref 0–0.08)
FENTANYL SCREEN, URINE: ABNORMAL
FLUAV RNA RESP QL NAA+PROBE: NOT DETECTED
FLUBV RNA RESP QL NAA+PROBE: NOT DETECTED
GFR SERPLBLD CREATININE-BSD FMLA CKD-EPI: >60 ML/MIN/{1.73_M2}
GLUCOSE SERPL-MCNC: 96 MG/DL (ref 70–99)
GLUCOSE UR STRIP.AUTO-MCNC: NEGATIVE MG/DL
HCG UR QL: NEGATIVE
HCT VFR BLD AUTO: 36.3 % (ref 36–48)
HGB BLD-MCNC: 12 G/DL (ref 12–16)
HGB UR QL STRIP.AUTO: ABNORMAL
KETONES UR STRIP.AUTO-MCNC: ABNORMAL MG/DL
LEUKOCYTE ESTERASE UR QL STRIP.AUTO: NEGATIVE
LYMPHOCYTES # BLD: 2.1 K/UL (ref 1–5.1)
LYMPHOCYTES NFR BLD: 35.7 %
MCH RBC QN AUTO: 28 PG (ref 26–34)
MCHC RBC AUTO-ENTMCNC: 33 G/DL (ref 31–36)
MCV RBC AUTO: 84.7 FL (ref 80–100)
METHADONE UR QL SCN>300 NG/ML: ABNORMAL
MONOCYTES # BLD: 0.3 K/UL (ref 0–1.3)
MONOCYTES NFR BLD: 5.9 %
NEUTROPHILS # BLD: 3.3 K/UL (ref 1.7–7.7)
NEUTROPHILS NFR BLD: 57.7 %
NITRITE UR QL STRIP.AUTO: NEGATIVE
OPIATES UR QL SCN>300 NG/ML: ABNORMAL
OXYCODONE UR QL SCN: ABNORMAL
PCP UR QL SCN>25 NG/ML: ABNORMAL
PH UR STRIP.AUTO: 6 [PH] (ref 5–8)
PH UR STRIP: 6 [PH]
PLATELET # BLD AUTO: 367 K/UL (ref 135–450)
PMV BLD AUTO: 7.3 FL (ref 5–10.5)
POTASSIUM SERPL-SCNC: 3.8 MMOL/L (ref 3.5–5.1)
PROT SERPL-MCNC: 8.1 G/DL (ref 6.4–8.2)
PROT UR STRIP.AUTO-MCNC: 30 MG/DL
RBC # BLD AUTO: 4.29 M/UL (ref 4–5.2)
RBC #/AREA URNS HPF: ABNORMAL /HPF (ref 0–4)
RENAL EPI CELLS #/AREA UR COMP ASSIST: ABNORMAL /HPF (ref 0–1)
SALICYLATES SERPL-MCNC: <0.3 MG/DL (ref 15–30)
SARS-COV-2 RNA RESP QL NAA+PROBE: NOT DETECTED
SODIUM SERPL-SCNC: 142 MMOL/L (ref 136–145)
SP GR UR STRIP.AUTO: >=1.03 (ref 1–1.03)
TSH SERPL DL<=0.005 MIU/L-ACNC: 1.12 UIU/ML (ref 0.27–4.2)
UA COMPLETE W REFLEX CULTURE PNL UR: ABNORMAL
UA DIPSTICK W REFLEX MICRO PNL UR: YES
URN SPEC COLLECT METH UR: ABNORMAL
UROBILINOGEN UR STRIP-ACNC: 0.2 E.U./DL
WBC # BLD AUTO: 5.8 K/UL (ref 4–11)
WBC #/AREA URNS HPF: ABNORMAL /HPF (ref 0–5)

## 2023-12-25 PROCEDURE — 93005 ELECTROCARDIOGRAM TRACING: CPT | Performed by: PHYSICIAN ASSISTANT

## 2023-12-25 PROCEDURE — 80179 DRUG ASSAY SALICYLATE: CPT

## 2023-12-25 PROCEDURE — 80307 DRUG TEST PRSMV CHEM ANLYZR: CPT

## 2023-12-25 PROCEDURE — 36415 COLL VENOUS BLD VENIPUNCTURE: CPT

## 2023-12-25 PROCEDURE — 80053 COMPREHEN METABOLIC PANEL: CPT

## 2023-12-25 PROCEDURE — 81001 URINALYSIS AUTO W/SCOPE: CPT

## 2023-12-25 PROCEDURE — 99285 EMERGENCY DEPT VISIT HI MDM: CPT

## 2023-12-25 PROCEDURE — 6370000000 HC RX 637 (ALT 250 FOR IP): Performed by: PSYCHIATRY & NEUROLOGY

## 2023-12-25 PROCEDURE — 87636 SARSCOV2 & INF A&B AMP PRB: CPT

## 2023-12-25 PROCEDURE — 1240000000 HC EMOTIONAL WELLNESS R&B

## 2023-12-25 PROCEDURE — 82077 ASSAY SPEC XCP UR&BREATH IA: CPT

## 2023-12-25 PROCEDURE — 80143 DRUG ASSAY ACETAMINOPHEN: CPT

## 2023-12-25 PROCEDURE — 84703 CHORIONIC GONADOTROPIN ASSAY: CPT

## 2023-12-25 PROCEDURE — 6370000000 HC RX 637 (ALT 250 FOR IP): Performed by: PHYSICIAN ASSISTANT

## 2023-12-25 PROCEDURE — 84443 ASSAY THYROID STIM HORMONE: CPT

## 2023-12-25 PROCEDURE — 85025 COMPLETE CBC W/AUTO DIFF WBC: CPT

## 2023-12-25 RX ORDER — POLYETHYLENE GLYCOL 3350 17 G
2 POWDER IN PACKET (EA) ORAL
Status: DISCONTINUED | OUTPATIENT
Start: 2023-12-25 | End: 2023-12-26 | Stop reason: HOSPADM

## 2023-12-25 RX ORDER — LORAZEPAM 2 MG/1
2 TABLET ORAL
Status: COMPLETED | OUTPATIENT
Start: 2023-12-25 | End: 2023-12-25

## 2023-12-25 RX ORDER — HYDROXYZINE 50 MG/1
50 TABLET, FILM COATED ORAL 3 TIMES DAILY PRN
Status: DISCONTINUED | OUTPATIENT
Start: 2023-12-25 | End: 2023-12-26 | Stop reason: HOSPADM

## 2023-12-25 RX ORDER — ACETAMINOPHEN 325 MG/1
650 TABLET ORAL EVERY 6 HOURS PRN
Status: DISCONTINUED | OUTPATIENT
Start: 2023-12-25 | End: 2023-12-26 | Stop reason: HOSPADM

## 2023-12-25 RX ORDER — LORAZEPAM 1 MG/1
1 TABLET ORAL ONCE
Status: COMPLETED | OUTPATIENT
Start: 2023-12-25 | End: 2023-12-25

## 2023-12-25 RX ORDER — TRAZODONE HYDROCHLORIDE 50 MG/1
50 TABLET ORAL NIGHTLY PRN
Status: DISCONTINUED | OUTPATIENT
Start: 2023-12-25 | End: 2023-12-26 | Stop reason: HOSPADM

## 2023-12-25 RX ORDER — SERTRALINE HYDROCHLORIDE 25 MG/1
25 TABLET, FILM COATED ORAL DAILY
COMMUNITY

## 2023-12-25 RX ADMIN — LORAZEPAM 1 MG: 1 TABLET ORAL at 15:45

## 2023-12-25 RX ADMIN — LORAZEPAM 2 MG: 2 TABLET ORAL at 22:54

## 2023-12-25 NOTE — PLAN OF CARE
CSSR-S Assessment completed with patient who then scored HIGH RISK. Provider Dr. Cash Parr was notified of HIGH RISK score, via Telephone at [phone]. Were suicide precautions ordered: YES, carried over from the ED. Pt denies current SI/HI/VH/AH and agrees to communicate with staff if no longer feel safe or in control. Patient was calm and cooperative during admission process, states she is willing to be here for treatment. Verbal order given to d/c constant observation and suicide precautions at this time.        Completed by: Bryon Russell RN

## 2023-12-25 NOTE — ED NOTES
Level of Care Disposition: admit     Patient was seen by ED provider and Nursing/SW staff. The case presented to psychiatric provider on-call . Based on the ED evaluation and information presented to the provider by this writer it is the recommendation of the on call psychiatric provider that inpatient hospitalization is the least restrictive environment for the patient at this time. The patient will be admitted to the inpatient unit.            Insurance Pre certification Authorization: Ángel Parmar
Writer attempted to call pt's  for collateral information at [phone]. He did not answer and writer was unable to leave a voice message. Writer also tried the number listed on pt's face sheet at [phone], there was no answer.        Kourtney GARCIA
Writer attempted to call pt's  for collateral information. He did not answer.          Jobie Hodgkins LSW
contextual factors: Pt reports she lives at home with her  and five children. She reports she does not want to feel how she is feeling and wants to be there for her kids. Pt reports she has been trying to fight these thoughts and feelings until Tuesday 12/26/23 to see her psychiatrist but states she cannot and is afraid she will act on them. She reports she had her  drop her off at the Hospital today. She reports her appetite has decreased and states she has not slept in a week. She reports she uses a THC vape. She reports no current self harm behaviors but states in the past she would punch herself in the head and smack her arms to where they would have bruises. Pt reports she has been having her medications switched recently and reports she thinks she has been having adverse effect to the medication. Pt reports she has been getting migraines and nausea. Pt reports she went to the ER yesterday. She reports they told her it was not the medication and think she should still take them. Pt reports she wants help and does not want her thoughts to get worse. Pt reports her  is her support system. C-FSIS flowsheet is  Complete. Psychiatric History (including current outpatient provider and past inpatient admissions): Pt reports she has no prior psych admissions. She reports she has done IOP in the past here at [facility]. She reports this past Friday she saw Dr. Heidi Garcia in [facility]. She reports he put her back on Zoloft. She reports she does not react well to Zoloft. She reports she's betrayed by him for not listening to her and putting her on a medication she does not do well on. She reports she feels frustrated because she was doing what she is suppose to be doing and she is still feeling this way. She reports she does not want to feel this way.       Access to Firearms: denies     ASSESSMENT FOR IMMINENT FUTURE DANGER:    RISK FACTORS:    []  Age <25 or >49   []  Male gender   [x]

## 2023-12-25 NOTE — ED PROVIDER NOTES
I did not personally evaluate this patient but I was asked to review the EKG. EKG  The Ekg interpreted by myself in the emergency department in the absence of a cardiologist.  normal sinus rhythm with a rate of 63  Axis is   Normal  QTc is  within an acceptable range  Intervals and Durations are unremarkable. No specific ST-T wave changes appreciated. No evidence of acute ischemia.    No significant change from prior EKG dated October 7, 2019     Johnny David MD  12/25/23 5527

## 2023-12-25 NOTE — ED PROVIDER NOTES
[address] [address] ED  EMERGENCY DEPARTMENT ENCOUNTER        Pt Name: Kam Shepherd  MRN: [id number]  [address] [facility] [place] 1990  Date of evaluation: 12/25/2023  Provider: Gerardo Sofia PA-C  PCP: TONJA Mackey CNP  Note Started: 2:27 PM EST 12/25/23      AISSATOU. I have evaluated this patient. CHIEF COMPLAINT       Chief Complaint   Patient presents with    Panic Attack     Started Zoloft and Rexulti a few days ago, anxiety and depression worsening. Starting to have thoughts of suicide. HISTORY OF PRESENT ILLNESS: 1 or more Elements     History From: Patient              Chief Complaint: Panic attack    Kam Shepherd is a 34 y.o. female who presents because she tells me her panic attacks and anxiety are out of control. She is losing touch with reality. She is starting to develop suicidal ideations. She tells me she is scared that she may not be around for her 5 children. She just started having panic attacks in February for which she was put on Seroquel and hydroxyzine. It seemed to help for a while but then recently over the past month or so she got dentures and she has had a lot of anxiety and regrets about this. She picks at her mouth and she gets very anxious and has recently been seen in the emergency department and [facility]. She was given Ativan. She recently had new psychiatry follow-up couple days ago and was put on Zoloft and risperidone. She has not reacted well to Zoloft in the past.  The day after she was taking the medication she developed body aches headache nausea dry heaving and was seen yesterday for this. She states she had blood work done at [facility] and was told this was not related to medication and so to continue taking these. Today she has been researching how to get ketamine and has contemplated getting other IV narcotic medication in order to control her symptoms.   She does not want to die but she does not know what else

## 2023-12-26 VITALS
RESPIRATION RATE: 16 BRPM | SYSTOLIC BLOOD PRESSURE: 118 MMHG | DIASTOLIC BLOOD PRESSURE: 88 MMHG | OXYGEN SATURATION: 99 % | HEIGHT: 63 IN | WEIGHT: 116 LBS | HEART RATE: 80 BPM | TEMPERATURE: 98.9 F | BODY MASS INDEX: 20.55 KG/M2

## 2023-12-26 PROBLEM — R45.851 SUICIDAL IDEATION: Status: ACTIVE | Noted: 2023-12-26

## 2023-12-26 PROBLEM — F12.90 CANNABIS USE DISORDER: Status: ACTIVE | Noted: 2023-12-26

## 2023-12-26 PROBLEM — F41.1 ANXIETY STATE: Status: ACTIVE | Noted: 2023-12-26

## 2023-12-26 PROBLEM — R00.0 TACHYCARDIA: Status: ACTIVE | Noted: 2023-12-26

## 2023-12-26 PROBLEM — F40.01 PANIC DISORDER WITH AGORAPHOBIA: Status: ACTIVE | Noted: 2023-12-26

## 2023-12-26 LAB
EKG ATRIAL RATE: 63 BPM
EKG DIAGNOSIS: NORMAL
EKG P AXIS: 77 DEGREES
EKG P-R INTERVAL: 126 MS
EKG Q-T INTERVAL: 394 MS
EKG QRS DURATION: 86 MS
EKG QTC CALCULATION (BAZETT): 403 MS
EKG R AXIS: 52 DEGREES
EKG T AXIS: 68 DEGREES
EKG VENTRICULAR RATE: 63 BPM

## 2023-12-26 PROCEDURE — 93010 ELECTROCARDIOGRAM REPORT: CPT | Performed by: INTERNAL MEDICINE

## 2023-12-26 PROCEDURE — 5130000000 HC BRIDGE APPOINTMENT

## 2023-12-26 PROCEDURE — 99223 1ST HOSP IP/OBS HIGH 75: CPT | Performed by: PSYCHIATRY & NEUROLOGY

## 2023-12-26 PROCEDURE — 99221 1ST HOSP IP/OBS SF/LOW 40: CPT | Performed by: NURSE PRACTITIONER

## 2023-12-26 PROCEDURE — 6370000000 HC RX 637 (ALT 250 FOR IP): Performed by: PSYCHIATRY & NEUROLOGY

## 2023-12-26 RX ORDER — CLONAZEPAM 1 MG/1
1 TABLET ORAL ONCE
Status: COMPLETED | OUTPATIENT
Start: 2023-12-26 | End: 2023-12-26

## 2023-12-26 RX ORDER — FLUOXETINE 10 MG/1
10 CAPSULE ORAL DAILY
Status: COMPLETED | OUTPATIENT
Start: 2023-12-26 | End: 2023-12-26

## 2023-12-26 RX ORDER — FLUOXETINE HYDROCHLORIDE 20 MG/1
20 CAPSULE ORAL DAILY
Status: DISCONTINUED | OUTPATIENT
Start: 2023-12-27 | End: 2023-12-26 | Stop reason: HOSPADM

## 2023-12-26 RX ADMIN — FLUOXETINE 10 MG: 10 CAPSULE ORAL at 14:43

## 2023-12-26 RX ADMIN — CLONAZEPAM 1 MG: 1 TABLET ORAL at 14:42

## 2023-12-26 NOTE — H&P
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 SOURCES OF INFORMATION:  The patient. Focused record review. CHIEF COMPLAINT:  \"I don't want to feel like this anymore. \"    HISTORY OF PRESENT ILLNESS:  The patient reports she cannot remember the  last time she felt things were okay. Over the last year she has  developed worsening symptoms of anxiety and depression. She describes worsening low mood, anhedonia, tearfulness, amotivation,  self-reproach, feelings of excessive guilt, insomnia, and some  increasing thoughts of suicide. She also describes symptoms of panic disorder including discrete periods  of heightened anxiety, impending doom, shortness of breath, and  dissociative-like feelings. These symptoms were made worse by dental surgery she had in 2023  where her teeth were removed, and she was given dentures. She thought  this would help with her smile, but it made things worse. This has led  to worsening symptoms, especially with anxiety. PSYCHIATRY REVIEW OF SYSTEMS:  No symptoms of marissa or psychosis. STRESSORS:  Her  is unemployed. 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 PAST PSYCHIATRIC HISTORY:  No hospitalizations. She did our IOP here  earlier this year.   She met with Dr. Gustavo Kirby for one visit

## 2023-12-26 NOTE — BH NOTE
Spoke to patient individually after she verbalized that she would like to be discharged. She spoke about how difficult it has been to adjust to getting dentures in November. States she has overcrowding in her teeth and that she \"stupidly went to an affordable dentures place and in 45 minutes I had no teeth. \" She said this has caused her a lot of anxiety because she had self image issues prior to dentures and now feels even more self conscious. States she is working with a woman in [facility] who is helping adjust the dentures and that she understands that it will take time for her dentures to fit correctly after her gums heal. She states she was seeing a therapist but she \"just wanted to talk\" and she feels she needs to talk to someone who can work with her on self image issues. She feels her suicidal ideation is situational to the dentures and she reports knowing this is something she is going to have to come to terms with. She states she came to the emergency department voluntarily because it was a holiday weekend and she could not reach her psychiatrist. She states she does not have current suicidal ideations but fears she will get them if her depression worsens. She states she is \"scared of her depression spiraling to the point I want to hurt myself. \" She states she does not want to die and that she lives for her children. She is future oriented in conversation with plans to follow up with her psychiatrist (Dr. Pauline Eric) and a wellness center ([facility]) in [place] regarding possible [place] treatment as an option. She states she has an appointment set tomorrow, which her  Parish Cortez confirms. She states she is hopeful she will improve so she can be more active in her childrens school and extracurricular activities. Denies suicidal ideation at this time.

## 2023-12-26 NOTE — BH NOTE
Bridge Appointment completed: Reviewed Discharge Instructions with patient. Patient verbalizes understanding and agreement with the discharge plan using the teachback method.        Vaccinations (otilia X if applicable and completed):  ( ) Patient states already received influenza vaccine elsewhere  ( ) Patient received influenza vaccine during this hospitalization  (x ) Patient refused influenza vaccine at this time  ( ) Not offered

## 2023-12-26 NOTE — BH NOTE
[facility]  Discharge Note    Pt discharged with followings belongings:   Dental Appliances: Uppers (fix a dent)  Vision - Corrective Lenses: None  Hearing Aid: None  Jewelry: None  Body Piercings Removed: N/A  Clothing: Undergarments, Pajamas, Pants, Shirt, Socks, Slippers  Other Valuables: Wallet   Valuables to patient. Patient educated on aftercare instructions: Yes  Information faxed to [facility] by SHELLY Arroyo RN  at 5:42 PM .Patient verbalize understanding of AVS:  Yes. Status EXAM upon discharge:  Mental Status and Behavioral Exam  Normal: No  Level of Assistance: Independent/Self  Facial Expression: Exaggerated, Sad  Affect: Unstable  Level of Consciousness: Alert  Frequency of Checks: 4 times per hour, close  Mood:Normal: No  Mood: Depressed, Anxious, Angry, Sad, Irritable  Motor Activity:Normal: No  Motor Activity: Decreased  Eye Contact: Good  Observed Behavior: Impulsive, Cooperative, Preoccupied, Tearful  Sexual Misconduct History: Current - no  Involved In Any Sexual Misconduct With Others? : No  History of Sexually Inappropriate Behavior When Previously Hospitalized?: No  Uncontrollable/Compulsive Masturbation?: No  Difficulty Controlling Sexual Impulses?: No  Preception: [place] to person, Cherokee to time, [place] to place, [place] to situation  Attention:Normal: No  Attention: Distractible  Thought Processes: Tangential  Thought Content:Normal: Yes  Thought Content: Preoccupations  Depression Symptoms: Crying, Feelings of helplessness, Feelings of hopelessess  Anxiety Symptoms: Generalized  Lillie Symptoms: No problems reported or observed.   Hallucinations: None  Delusions: No  Memory:Normal: Yes  Insight and Judgment: No  Insight and Judgment: Poor judgment, Poor insight    Tobacco Screening:  Practical Counseling, on admission, otilia X, if applicable and completed (first 3 are required if patient doesn't refuse):   Refused, pt vapes      ( ) Recognizing danger

## 2023-12-26 NOTE — H&P
Hospital Medicine History & Physical      PCP: TONJA Maharaj CNP    Date of Admission: 2023    Date of Service: Pt seen/examined on 2023     Chief Complaint:    Chief Complaint   Patient presents with    Panic Attack     Started Zoloft and Rexulti a few days ago, anxiety and depression worsening. Starting to have thoughts of suicide. History Of Present Illness: The patient is a 34 y.o. female with h/o kidney stones, UTI, anemia, and anxiety who presented to [facility] ED with complaint of SI. Patient was seen and evaluated in the ED by the ED medical provider, patient was medically cleared for admission to [facility]. This note serves as an admission medical H&P.    PCP: TONJA Maharaj CNP  Tobacco use: never  ETOH use: denies  Illicit drug use: denies    Patient denies any symptoms/complaints. Past Medical History:        Diagnosis Date    Anemia     Anxiety     Depression     no meds    History of recurrent UTI (urinary tract infection)     Infections of kidney     Kidney stone 2011    Miscarriage     Other disorders of kidney and ureter     2 ureters on R side    Status post cystoscopy 11       Past Surgical History:        Procedure Laterality Date     SECTION  2014    X 2;  & ,  2015    CYSTOSCOPY  [id number]    [facility]    EYE SURGERY      EYE SURGERY         Medications Prior to Admission:    Prior to Admission medications    Medication Sig Start Date End Date Taking?  Authorizing Provider   sertraline (ZOLOFT) 25 MG tablet Take 1 tablet by mouth daily   Yes Provider, MD Jf   Brexpiprazole (REXULTI PO) Take by mouth   Yes Provider, MD Jf   QUEtiapine (SEROQUEL XR) 50 MG extended release tablet Take 1 tablet by mouth nightly for 7 days  Patient not taking: Reported on 2023  Robin Cook PA-C   SUMAtriptan (IMITREX) 50 MG tablet TAKE 1 TABLET BY MOUTH ONCE AS NEEDED FOR MIGRAINE 23

## 2023-12-26 NOTE — PLAN OF CARE
Problem: Risk for Elopement  Goal: Patient will not exit the unit/facility without proper excort  12/25/2023 2201 by Nino Balderas RN  Outcome: Progressing     Problem: Safety - Adult  Goal: Free from fall injury  12/25/2023 2201 by Nino Balderas RN  Outcome: Progressing   Josue denied SI/HI,A/V hallucinations. She is able to verbalize her needs, she denied any c/o pain. She is hoping that her medications will work and she will be stable with new medication. She has been free from falls thus far this shift. Safety checks continue Q 15 minutes throughout the shift.

## 2023-12-26 NOTE — DISCHARGE INSTRUCTIONS
Advanced Directives:  Patient does not have a surrogate decision maker appointed     Patient does not have a psychiatric and/ or medical advanced directive or power of . Patient was not offered psychiatric and/ or medical advanced directive or power of  information/completion but declined to complete   Why not? Not clinically appropriate at this time    Discharge Planning is Complete. Discharge Date: 12/26/2023  Reason for Hospitalization: A 33 yo female who presents with increased anxiety with frequent panic attacks, an overwhelming feeling of doom, and suicidal ideations following recent medication change  Discharge Diagnosis: Mood Disorder, Unspecified  Discharging to: Private Residence    Your instructions: Your physician here was Dat Quinones MD. If you have any questions please call the unit at [phone] for the adult unit or [phone] for [facility]. Please note that we have a patient family advisory [employer] that meets the second Wednesday of January and the second Wednesday of July at [address] [place] [address] in [place] at [facility]. Department leadership would love for you to attend to give feedback on what we are doing well and areas in which we can improve our patient care. Please come if you are able and feel free to reach out to [address] [place] [address] at [phone] with any questions. The crisis number for [facility] is [phone]. You can use this number at any time to access emergency mental health services. Please follow up with your PCP regarding any pending labs.      Your next appointment is:  Name of Provider: Radha Maya MD   Provider specialty/license: Primary Care   Date and time of appointment: To Be Determined   The type/s of services requested are: [address] [place] [address] Name: [facility]  Address: [address], [address] [facility]   Phone Number: [phone]   Special instructions

## 2023-12-26 NOTE — PLAN OF CARE
Dede Hope is alert and oriented X4. She remains depressed and is crying at times. She stated she does nothing but cry here and wants to go home. Her  was here and he stated she was not spending one more night in this place. He believes she is not getting treatment. He cuts writer off when treatments are trying to be explained to him. He states she will be better off at home and that he is not working and will take care of her. He states he will keep her safe. Call placed to Dr. Haro Brought and he ok D/C to home.  here and pt agreed with her discharge instructions.
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
## 2023-12-26 NOTE — GROUP NOTE
Group Therapy Note    Date: 12/26/2023    Group Start Time: 1000  Group End Time: 8036  Group Topic: Psychoeducation    [address], [place]        Group Therapy Note    Attendees: 3    Therapist facilitated Psych Education session centered on a healthy leisure lifestyle. Patients were given paper copies of of leisure assessment to fill out individually. Group processed the importance of healthy leisure participation as well as having a support system and utilizing resources available when needed. Notes:  Patient was present and engaged across session. Patient receptive to information presented and participated in filling out individual assessment. Met goal for group. Status After Intervention:  Unchanged    Participation Level:  Active Listener    Participation Quality: Appropriate and Attentive      Speech:  normal      Thought Process/Content: Linear  Perseverating      Affective Functioning: Congruent      Mood: depressed      Level of consciousness:  Alert and Attentive      Response to Learning: Able to verbalize current knowledge/experience, Able to retain information, Capable of insight, and Progressing to goal      Endings: None Reported    Modes of Intervention: Education, Support, Socialization, and Activity      Discipline Responsible: Psychoeducational Specialist and Recreational Therapist      Signature:  Jaxson Marroquin MA, [place]

## 2023-12-28 ENCOUNTER — FOLLOWUP TELEPHONE ENCOUNTER (OUTPATIENT)
Dept: PSYCHIATRY | Age: 33
End: 2023-12-28

## 2024-01-02 ENCOUNTER — FOLLOWUP TELEPHONE ENCOUNTER (OUTPATIENT)
Dept: PSYCHIATRY | Age: 34
End: 2024-01-02

## 2024-01-03 ENCOUNTER — FOLLOWUP TELEPHONE ENCOUNTER (OUTPATIENT)
Dept: PSYCHIATRY | Age: 34
End: 2024-01-03

## 2024-02-26 ENCOUNTER — OFFICE VISIT (OUTPATIENT)
Dept: ORTHOPEDIC SURGERY | Age: 34
End: 2024-02-26
Payer: COMMERCIAL

## 2024-02-26 VITALS — HEIGHT: 63 IN | BODY MASS INDEX: 21.26 KG/M2 | WEIGHT: 120 LBS

## 2024-02-26 DIAGNOSIS — M25.522 LEFT ELBOW PAIN: Primary | ICD-10-CM

## 2024-02-26 PROCEDURE — G8420 CALC BMI NORM PARAMETERS: HCPCS | Performed by: PHYSICIAN ASSISTANT

## 2024-02-26 PROCEDURE — 1036F TOBACCO NON-USER: CPT | Performed by: PHYSICIAN ASSISTANT

## 2024-02-26 PROCEDURE — 99203 OFFICE O/P NEW LOW 30 MIN: CPT | Performed by: PHYSICIAN ASSISTANT

## 2024-02-26 PROCEDURE — G8484 FLU IMMUNIZE NO ADMIN: HCPCS | Performed by: PHYSICIAN ASSISTANT

## 2024-02-26 PROCEDURE — G8427 DOCREV CUR MEDS BY ELIG CLIN: HCPCS | Performed by: PHYSICIAN ASSISTANT

## 2024-02-26 RX ORDER — MEDROXYPROGESTERONE ACETATE 150 MG/ML
INJECTION, SUSPENSION INTRAMUSCULAR
COMMUNITY

## 2024-02-26 RX ORDER — BENZONATATE 100 MG/1
CAPSULE ORAL
COMMUNITY

## 2024-02-26 RX ORDER — FLUOXETINE 10 MG/1
CAPSULE ORAL
COMMUNITY

## 2024-02-26 RX ORDER — GABAPENTIN 300 MG/1
CAPSULE ORAL
COMMUNITY
Start: 2024-02-22

## 2024-02-26 RX ORDER — BREXPIPRAZOLE 0.5 MG/1
TABLET ORAL
COMMUNITY
Start: 2023-12-22

## 2024-02-26 RX ORDER — CYCLOBENZAPRINE HCL 5 MG
TABLET ORAL
COMMUNITY
Start: 2023-10-05

## 2024-02-26 RX ORDER — LORAZEPAM 1 MG/1
TABLET ORAL
COMMUNITY

## 2024-02-26 RX ORDER — ALBUTEROL SULFATE 90 UG/1
AEROSOL, METERED RESPIRATORY (INHALATION)
COMMUNITY

## 2024-02-26 RX ORDER — BUSPIRONE HYDROCHLORIDE 10 MG/1
TABLET ORAL
COMMUNITY

## 2024-02-26 RX ORDER — FLUCONAZOLE 150 MG/1
TABLET ORAL
COMMUNITY

## 2024-02-26 RX ORDER — VILAZODONE HYDROCHLORIDE 10 MG/1
TABLET ORAL
COMMUNITY

## 2024-02-26 RX ORDER — HYDROXYZINE HYDROCHLORIDE 10 MG/1
TABLET, FILM COATED ORAL
COMMUNITY

## 2024-02-26 RX ORDER — MONTELUKAST SODIUM 10 MG/1
TABLET ORAL
COMMUNITY

## 2024-02-26 RX ORDER — CLONAZEPAM 0.5 MG/1
TABLET ORAL
COMMUNITY

## 2024-02-26 RX ORDER — DIAZEPAM 5 MG/1
TABLET ORAL
COMMUNITY

## 2024-02-26 NOTE — PROGRESS NOTES
Subjective    Patient ID: Josue Almeida is a 33 y.o..  female who presents today for evaluation of left elbow pain.  Patient states that on 2/17/2024 she fell onto her outstretched left upper extremity creating a valgus type stress to the left elbow.  She had immediate pain with bruising over the medial aspect of her left elbow.  Patient was seen in the ED at [facility] where x-rays were obtained that showed no acute fractures within the left elbow.  She was in a sling and Ace for a week which she states that she has remove these and has been working on range of motion.  She states that the pain is gradually improving at the present time she states that the pain is 6/10.  She does still have weakness with grasping and lifting objects on the left side.  She is right-hand dominant states that she has had previous fracture to the left upper extremity as a child..  Pain Assessment  Location of Pain: Elbow  Location Modifiers: Right  Severity of Pain: 6  Quality of Pain: Other (Comment)  Duration of Pain: Other (Comment)  Frequency of Pain: Other (Comment)]  Patient's medications, allergies, past medical, surgical, social and family histories were reviewed and updated as appropriate.     Physical Exam:  Constitutional:  Pt well groomed, no acute distress, well developed, no obvious deformities  Vitals:    02/26/24 1046   Weight: 54.4 kg (120 lb)   Height: 1.6 m (5' 3\")     -Oriented to person, place, and time  -Mood and affect is appropriate    Elbow exam:  soft tissue tenderness, ecchymosis and swelling at the medial aspect of the left elbow.  She lacks approximately 3 to 5 degrees of full extension but is able to fully flex left elbow.  She has full supination and pronation and there is no pain with resisted wrist extension or flexion.  Distal neurovascular status is grossly intact      Contralateral Exam:  -No obvious deformities  -No abrasions or cellulitis noted, NVI   -Full ROM   -No joint

## 2025-02-13 ENCOUNTER — APPOINTMENT (OUTPATIENT)
Dept: GENERAL RADIOLOGY | Age: 35
End: 2025-02-13
Payer: COMMERCIAL

## 2025-02-13 ENCOUNTER — HOSPITAL ENCOUNTER (EMERGENCY)
Age: 35
Discharge: HOME | End: 2025-02-13
Payer: COMMERCIAL

## 2025-02-13 VITALS
OXYGEN SATURATION: 96 % | RESPIRATION RATE: 16 BRPM | DIASTOLIC BLOOD PRESSURE: 56 MMHG | BODY MASS INDEX: 19.95 KG/M2 | WEIGHT: 112.6 LBS | SYSTOLIC BLOOD PRESSURE: 116 MMHG | TEMPERATURE: 98.1 F | HEART RATE: 75 BPM

## 2025-02-13 DIAGNOSIS — Z79.899 MEDICATION MANAGEMENT: ICD-10-CM

## 2025-02-13 DIAGNOSIS — R30.0 DYSURIA: Primary | ICD-10-CM

## 2025-02-13 LAB
ALBUMIN SERPL-MCNC: 4.2 G/DL (ref 3.4–5)
ALBUMIN/GLOB SERPL: 1.6 {RATIO} (ref 1.1–2.2)
ALP SERPL-CCNC: 44 U/L (ref 40–129)
ALT SERPL-CCNC: 13 U/L (ref 10–40)
ANION GAP SERPL CALCULATED.3IONS-SCNC: 12 MMOL/L (ref 3–16)
AST SERPL-CCNC: 17 U/L (ref 15–37)
BACTERIA URNS QL MICRO: ABNORMAL /HPF
BASOPHILS # BLD: 0 K/UL (ref 0–0.2)
BASOPHILS NFR BLD: 0.5 %
BILIRUB SERPL-MCNC: <0.2 MG/DL (ref 0–1)
BILIRUB UR QL STRIP.AUTO: ABNORMAL
BUN SERPL-MCNC: 15 MG/DL (ref 7–20)
CALCIUM SERPL-MCNC: 8.7 MG/DL (ref 8.3–10.6)
CHLORIDE SERPL-SCNC: 105 MMOL/L (ref 99–110)
CLARITY UR: ABNORMAL
CO2 SERPL-SCNC: 25 MMOL/L (ref 21–32)
COLOR UR: YELLOW
CREAT SERPL-MCNC: 0.7 MG/DL (ref 0.6–1.1)
DEPRECATED RDW RBC AUTO: 14.1 % (ref 12.4–15.4)
EOSINOPHIL # BLD: 0 K/UL (ref 0–0.6)
EOSINOPHIL NFR BLD: 0.1 %
EPI CELLS #/AREA URNS HPF: ABNORMAL /HPF (ref 0–5)
GFR SERPLBLD CREATININE-BSD FMLA CKD-EPI: >90 ML/MIN/{1.73_M2}
GLUCOSE SERPL-MCNC: 75 MG/DL (ref 70–99)
GLUCOSE UR STRIP.AUTO-MCNC: NEGATIVE MG/DL
HCG SERPL QL: NEGATIVE
HCT VFR BLD AUTO: 34.4 % (ref 36–48)
HGB BLD-MCNC: 11.8 G/DL (ref 12–16)
HGB UR QL STRIP.AUTO: ABNORMAL
KETONES UR STRIP.AUTO-MCNC: >=80 MG/DL
LACTATE BLDV-SCNC: 0.8 MMOL/L (ref 0.4–1.9)
LEUKOCYTE ESTERASE UR QL STRIP.AUTO: NEGATIVE
LYMPHOCYTES # BLD: 2 K/UL (ref 1–5.1)
LYMPHOCYTES NFR BLD: 46.5 %
MCH RBC QN AUTO: 28.8 PG (ref 26–34)
MCHC RBC AUTO-ENTMCNC: 34.4 G/DL (ref 31–36)
MCV RBC AUTO: 83.7 FL (ref 80–100)
MONOCYTES # BLD: 0.2 K/UL (ref 0–1.3)
MONOCYTES NFR BLD: 5 %
MUCOUS THREADS #/AREA URNS LPF: ABNORMAL /LPF
NEUTROPHILS # BLD: 2 K/UL (ref 1.7–7.7)
NEUTROPHILS NFR BLD: 47.9 %
NITRITE UR QL STRIP.AUTO: NEGATIVE
PH UR STRIP.AUTO: 6.5 [PH] (ref 5–8)
PLATELET # BLD AUTO: 251 K/UL (ref 135–450)
PMV BLD AUTO: 7.9 FL (ref 5–10.5)
POTASSIUM SERPL-SCNC: 3.7 MMOL/L (ref 3.5–5.1)
PROT SERPL-MCNC: 6.8 G/DL (ref 6.4–8.2)
PROT UR STRIP.AUTO-MCNC: ABNORMAL MG/DL
RBC # BLD AUTO: 4.11 M/UL (ref 4–5.2)
RBC #/AREA URNS HPF: >100 /HPF (ref 0–4)
SODIUM SERPL-SCNC: 142 MMOL/L (ref 136–145)
SP GR UR STRIP.AUTO: 1.02 (ref 1–1.03)
TROPONIN, HIGH SENSITIVITY: <6 NG/L (ref 0–14)
TROPONIN, HIGH SENSITIVITY: <6 NG/L (ref 0–14)
UA COMPLETE W REFLEX CULTURE PNL UR: ABNORMAL
UA DIPSTICK W REFLEX MICRO PNL UR: YES
URN SPEC COLLECT METH UR: ABNORMAL
UROBILINOGEN UR STRIP-ACNC: 1 E.U./DL
WBC # BLD AUTO: 4.2 K/UL (ref 4–11)
WBC #/AREA URNS HPF: ABNORMAL /HPF (ref 0–5)

## 2025-02-13 PROCEDURE — 93005 ELECTROCARDIOGRAM TRACING: CPT | Performed by: EMERGENCY MEDICINE

## 2025-02-13 PROCEDURE — 81001 URINALYSIS AUTO W/SCOPE: CPT

## 2025-02-13 PROCEDURE — 83605 ASSAY OF LACTIC ACID: CPT

## 2025-02-13 PROCEDURE — 99285 EMERGENCY DEPT VISIT HI MDM: CPT

## 2025-02-13 PROCEDURE — 71046 X-RAY EXAM CHEST 2 VIEWS: CPT

## 2025-02-13 PROCEDURE — 85025 COMPLETE CBC W/AUTO DIFF WBC: CPT

## 2025-02-13 PROCEDURE — 80053 COMPREHEN METABOLIC PANEL: CPT

## 2025-02-13 PROCEDURE — 36415 COLL VENOUS BLD VENIPUNCTURE: CPT

## 2025-02-13 PROCEDURE — 2580000003 HC RX 258: Performed by: REGISTERED NURSE

## 2025-02-13 PROCEDURE — 84484 ASSAY OF TROPONIN QUANT: CPT

## 2025-02-13 PROCEDURE — 84703 CHORIONIC GONADOTROPIN ASSAY: CPT

## 2025-02-13 RX ORDER — PHENAZOPYRIDINE HYDROCHLORIDE 100 MG/1
100 TABLET, FILM COATED ORAL 3 TIMES DAILY PRN
Qty: 9 TABLET | Refills: 0 | Status: SHIPPED | OUTPATIENT
Start: 2025-02-13 | End: 2025-02-16

## 2025-02-13 RX ORDER — 0.9 % SODIUM CHLORIDE 0.9 %
1000 INTRAVENOUS SOLUTION INTRAVENOUS ONCE
Status: COMPLETED | OUTPATIENT
Start: 2025-02-13 | End: 2025-02-13

## 2025-02-13 RX ADMIN — SODIUM CHLORIDE 1000 ML: 0.9 INJECTION, SOLUTION INTRAVENOUS at 20:11

## 2025-02-13 ASSESSMENT — ENCOUNTER SYMPTOMS
SHORTNESS OF BREATH: 0
VOMITING: 0
DIARRHEA: 0
CHEST TIGHTNESS: 0
ABDOMINAL PAIN: 0
NAUSEA: 1

## 2025-02-13 ASSESSMENT — PAIN - FUNCTIONAL ASSESSMENT
PAIN_FUNCTIONAL_ASSESSMENT: NONE - DENIES PAIN
PAIN_FUNCTIONAL_ASSESSMENT: 0-10

## 2025-02-13 ASSESSMENT — PAIN SCALES - GENERAL: PAINLEVEL_OUTOF10: 0

## 2025-02-14 LAB
EKG ATRIAL RATE: 68 BPM
EKG DIAGNOSIS: NORMAL
EKG P AXIS: 65 DEGREES
EKG P-R INTERVAL: 128 MS
EKG Q-T INTERVAL: 394 MS
EKG QRS DURATION: 88 MS
EKG QTC CALCULATION (BAZETT): 418 MS
EKG R AXIS: 22 DEGREES
EKG T AXIS: 44 DEGREES
EKG VENTRICULAR RATE: 68 BPM

## 2025-02-14 PROCEDURE — 93010 ELECTROCARDIOGRAM REPORT: CPT | Performed by: INTERNAL MEDICINE

## 2025-02-14 NOTE — ED PROVIDER NOTES
I was not asked to see this patient.  I was available for consultation if deemed necessary.  I was only asked to interpret the EKG.  Please see NP Andrew's note for care of the patient while in the emergency department and for final disposition.    The Ekg interpreted by me shows  Sinus rhythm with sinus arrhythmia noted  with a rate of 68  Axis is   Normal  QTc is  normal  Intervals and Durations are unremarkable.      ST Segments: no acute change and nonspecific changes  No significant change from prior EKG dated - 12/25/23  No STEMI, RSR' present today, no signs of Brugada syndrome currently           Dante Ortez MD  02/13/25 7401    
Maternal Grandmother     Diabetes Paternal Grandmother     Diabetes Paternal Grandfather     Heart Disease Paternal Grandfather           SOCIAL HISTORY       Social History     Socioeconomic History    Marital status:      Spouse name: None    Number of children: None    Years of education: None    Highest education level: None   Occupational History    Occupation: homemaker   Tobacco Use    Smoking status: Never    Smokeless tobacco: Never   Vaping Use    Vaping status: Never Used   Substance and Sexual Activity    Alcohol use: No    Drug use: No    Sexual activity: Yes     Partners: Male     Social Determinants of Health     Food Insecurity: Not At Risk (12/24/2024)    Received from [facility]    Food Insecurities     Within the past 12 months, did you worry that your food would run out before you got money to buy more?: No     Within the past 12 months, did the food you bought just not last and you didn't have money to get more?: No   Transportation Needs: Not At Risk (12/24/2024)    [facility]    Transportation     Within the past 12 months, has a lack of transportation kept you from medical appointments or from doing things needed for daily living?: No   Intimate Partner Violence: Not At Risk (12/24/2024)    [facility]    Interpersonal Safety     Do you feel physically or emotionally unsafe where you currently live?: No     Within the past 12 months, have you been hit, slapped, kicked or otherwise physically hurt by anyone? : No     Within the past 12 months, have you been hit, slapped, kicked or otherwise physically hurt by anyone? : No   Housing Stability: Not At Risk (12/24/2024)    [facility] and [facility]    Housing/Utilities     Are you worried about losing your housing? : No     Within the past 12 months, have you ever stayed: outside, in a car, in a

## 2025-02-14 NOTE — DISCHARGE INSTRUCTIONS
Please follow-up with Arian Brandt to further discuss your medications and any possible changes.  Your urinalysis here was reassuring.  It appears that your antibiotics are working to treat your urinary tract infection.  Please continue them until completion.  Return to the nearest emergency department for any new or worsening symptoms.  I did prescribe you some Pyridium to help with the burning with urination.

## 2025-02-21 NOTE — PROGRESS NOTES
CARDIOLOGY CONSULTATION        Patient Name: Josue Almeida  Primary Care physician: Kiara Anderson, APRN - CNP    Reason for Referral/Chief Complaint: Josue Almeida is a 34 y.o. patient who is referred to cardiology clinic today for evaluation and treatment of palpitations and hypotension.     History of Present Illness:   Josue Almeida 34 y.o. female with a medical history notable for anxiety, panic disorder, depression, cannabis use, ortho static hypotension, syncope, insomnia, mood disorder, palpitations, and tachycardia.     Today she presents with her  and daughter.   States she has always had issues with lower blood pressures.  States around /2024 she started feeling worse and off balance.   States she has done research and found the vilazodone 10 mg she takes can lower her blood pressure. States she can notice when her blood pressure is dropping.  Starts feeling really tired and unstable/lightheaded.  Will go and lay down. She can hardly help with putting down the kids or stay up for dinner time as she feels so fatigued. States has discussed the vilazondone /low blood pressures with her psychologist.  Dose has been reduced to 5 mg but still having the symptoms. Prefers her to remain on the medication as she notes very little therapeutic options left for management of her mental health. States she is trying to drink more electrolytes.   Has been drinking 32 ounces of IV water and 16 ounces of Gatorade. Poor appetite.        Past Medical History:   has a past medical history of Anemia, Anxiety, Depression, History of recurrent UTI (urinary tract infection), Infections of kidney, Kidney stone, Miscarriage, Other disorders of kidney and ureter, and Status post cystoscopy.    Surgical History:   has a past surgical history that includes Eye surgery ();  section (); Cystoscopy ([id number]); and eye surgery.     Social History:   reports that

## 2025-02-24 ENCOUNTER — OFFICE VISIT (OUTPATIENT)
Dept: CARDIOLOGY CLINIC | Age: 35
End: 2025-02-24

## 2025-02-24 VITALS
HEART RATE: 71 BPM | SYSTOLIC BLOOD PRESSURE: 98 MMHG | BODY MASS INDEX: 20.06 KG/M2 | WEIGHT: 113.2 LBS | DIASTOLIC BLOOD PRESSURE: 74 MMHG | OXYGEN SATURATION: 99 % | HEIGHT: 63 IN

## 2025-02-24 DIAGNOSIS — R53.83 OTHER FATIGUE: ICD-10-CM

## 2025-02-24 DIAGNOSIS — I95.0 IDIOPATHIC HYPOTENSION: ICD-10-CM

## 2025-02-24 DIAGNOSIS — R55 SYNCOPE, UNSPECIFIED SYNCOPE TYPE: Primary | ICD-10-CM

## 2025-02-24 RX ORDER — MIDODRINE HYDROCHLORIDE 2.5 MG/1
2.5 TABLET ORAL 3 TIMES DAILY
Qty: 90 TABLET | Refills: 3 | Status: SHIPPED | OUTPATIENT
Start: 2025-02-24

## 2025-02-24 NOTE — PATIENT INSTRUCTIONS
PLAN:  1.  Recommend targeting 64 to 80 ounces of water daily  2.  Orthostatics in office today   3.  Morning Labs -  potassium, TSH, magnesium, Cortisol, Renin/Aldosterone    We will call you with the results   4.  Patient will be started on new medication Midodrine 2.5 mg. Take 3 times daily.  8 am , 12 pm and 4 pm.  Patient verbalizes understanding of the need for treatment and education provided at today's visit.  Additional education materials will be provided in the AVS.

## 2025-03-24 ENCOUNTER — LAB (OUTPATIENT)
Dept: FAMILY MEDICINE CLINIC | Age: 35
End: 2025-03-24

## 2025-03-24 DIAGNOSIS — I95.0 IDIOPATHIC HYPOTENSION: ICD-10-CM

## 2025-03-24 DIAGNOSIS — R53.83 OTHER FATIGUE: ICD-10-CM

## 2025-03-24 LAB
CORTIS AM PEAK SERPL-MCNC: 13.6 UG/DL (ref 4.3–22.4)
MAGNESIUM SERPL-MCNC: 2.29 MG/DL (ref 1.8–2.4)
POTASSIUM SERPL-SCNC: 4.1 MMOL/L (ref 3.5–5.1)
TSH SERPL DL<=0.005 MIU/L-ACNC: 0.58 UIU/ML (ref 0.27–4.2)

## 2025-03-24 NOTE — PROGRESS NOTES
Blood drawn per order.  Needle size: 23 g  Site: L Antecubital.  First attempt successful Yes    Second attempt no    Pressure applied until bleeding stopped.  Pressure applied.    Patient informed to call office or return if bleeding reoccurs and unable to stop.    Tubes drawn: 2 purple     3 red

## 2025-03-25 ENCOUNTER — RESULTS FOLLOW-UP (OUTPATIENT)
Dept: CARDIOLOGY CLINIC | Age: 35
End: 2025-03-25

## 2025-03-25 DIAGNOSIS — I95.89 OTHER SPECIFIED HYPOTENSION: ICD-10-CM

## 2025-03-25 DIAGNOSIS — I95.0 IDIOPATHIC HYPOTENSION: ICD-10-CM

## 2025-03-25 DIAGNOSIS — R53.82 CHRONIC FATIGUE: ICD-10-CM

## 2025-03-25 DIAGNOSIS — R53.1 WEAKNESS: Primary | ICD-10-CM

## 2025-03-26 ENCOUNTER — TELEPHONE (OUTPATIENT)
Dept: CARDIOLOGY CLINIC | Age: 35
End: 2025-03-26

## 2025-03-26 NOTE — TELEPHONE ENCOUNTER
LOW (BRADYCARDIA) OR HIGH (TACHYCARDIA) HEART RATE (5 questions)      1) Can you tell me what your heart rate and blood pressure is?     A. HEART RATE READIN. IF HR under 50 send as High Priority, if above 100 send    B: BLOOD PRESSURE READIN.if systolic is above 150 send as high priority, if systolic 100 and under send as high priority    Early this morning  74/39  While on the phone  100/74  2) Do you use a device such as an apple watch or Ceres mobile device? YES []  Or  NO [x]     A. If yes, please upload images to your Y[employer] if applicable. If assistance needed please have patient contact [employer] [phone] if unable to assist, or ask a staff          member for assistance         3) Are you having any symptoms?     A. Dizziness/lightheadeness? YES [] or NO [x]   B. Headache? YES []  or NO [x]   C. Fatigue?  YES [x]  or NO []   D. Fainting (syncope)? YES [x] or NO []    E. Palpitations (heart pounding)? YES [] Or NO [x]   F. Shortness of Breath? YES []  Or NO [x]   G. Flushing (for high heart rate) YES []  Or NO [x]        4) Have you taken any medication for this today?  Yes      5)  Do you have a device implant such as a pacemaker, defibrillator, or loop recorder? YES []  Or NO [x]               ([employer] II-loop recorder cannot send manual transmissions)     A. If patient has a device implant please have them send transmission after call!!            If assistance is needed to send a transmission please give the correct device company phone number to patient.            [employer]: [phone]       [employer]: [phone] (1-556-nkqwsvt) or [phone]       [employer]: [phone]       [employer]-CANNOT send manual transmissions, do not ask pt to send

## 2025-03-26 NOTE — TELEPHONE ENCOUNTER
Called and spoke with pt, she was calling concerned for her bp, not her pulse. Pt said she noticed hypotension starting in June/July of last year. Pt did not have a syncopal episode, but has in the past. Pt mentioned she was started on trazodone last night and didn't sleep last night. Pt is only taking her midodrine BID, NOT tid. Pt did mention that she thinks she has pots and that is why this is happening. Please advise

## 2025-03-26 NOTE — TELEPHONE ENCOUNTER
Please again for nursing visit for orthostatics.  Please have her take midodrine as she has been prior to the visit.  Do not interrupt this scheduled medication.

## 2025-03-27 LAB
ALDOST SERPL-MCNC: 47.2 NG/DL
ALDOST/RENIN PLAS-RTO: 18.2 RATIO
RENIN PLAS-CCNC: 2.6 NG/ML/HR

## 2025-03-27 RX ORDER — COSYNTROPIN 0.25 MG/ML
0.25 INJECTION, POWDER, FOR SOLUTION INTRAMUSCULAR; INTRAVENOUS ONCE
Status: SHIPPED | OUTPATIENT
Start: 2025-03-27

## 2025-03-27 NOTE — TELEPHONE ENCOUNTER
Attempted to reach pt, spoke with pts  per HIPAA, relayed they can do that if PCP can fax the vitals to us to review. He v/u, gave fax number and cx our bp appt.

## 2025-03-27 NOTE — TELEPHONE ENCOUNTER
Pt's spouse  wants to know if pt can have her Orthostatic B/P checked at her PCP office instead of driving all that way. Please advise.

## 2025-04-04 NOTE — TELEPHONE ENCOUNTER
Spoke with the lab regarding the ACTH stimulation test.  They do these at any of the hospital labs.  Patient would need to call one of the main hospital labs to have this done.      Called and spoke with patient.  Relayed information.  She will have this done at [place].  Number given and test name. Patient will call and speak with lab to see what she needs to do on her end and to schedule the lab test.

## 2025-04-04 NOTE — TELEPHONE ENCOUNTER
----- Message from SUMANTH JONES RN sent at 4/3/2025  4:50 PM EDT -----  I spoke to patientTamara Castillo sending to you to see how this is completed.

## 2025-04-07 DIAGNOSIS — R53.82 CHRONIC FATIGUE: ICD-10-CM

## 2025-04-07 DIAGNOSIS — R53.1 WEAKNESS: Primary | ICD-10-CM

## 2025-04-07 DIAGNOSIS — I95.89 CHRONIC HYPOTENSION: ICD-10-CM

## 2025-04-07 RX ORDER — COSYNTROPIN 0.25 MG/ML
250 INJECTION, POWDER, FOR SOLUTION INTRAMUSCULAR; INTRAVENOUS ONCE
OUTPATIENT
Start: 2025-04-22 | End: 2025-04-22

## 2025-04-14 ENCOUNTER — HOSPITAL ENCOUNTER (INPATIENT)
Age: 35
LOS: 2 days | Discharge: HOME | DRG: 207 | End: 2025-04-16
Attending: STUDENT IN AN ORGANIZED HEALTH CARE EDUCATION/TRAINING PROGRAM | Admitting: STUDENT IN AN ORGANIZED HEALTH CARE EDUCATION/TRAINING PROGRAM
Payer: COMMERCIAL

## 2025-04-14 ENCOUNTER — TELEPHONE (OUTPATIENT)
Dept: CARDIOLOGY CLINIC | Age: 35
End: 2025-04-14

## 2025-04-14 DIAGNOSIS — I95.9 TRANSIENT HYPOTENSION: Primary | ICD-10-CM

## 2025-04-14 LAB
ALBUMIN SERPL-MCNC: 3.9 G/DL (ref 3.4–5)
ALBUMIN/GLOB SERPL: 1.3 {RATIO} (ref 1.1–2.2)
ALP SERPL-CCNC: 35 U/L (ref 40–129)
ALT SERPL-CCNC: 14 U/L (ref 10–40)
ANION GAP SERPL CALCULATED.3IONS-SCNC: 12 MMOL/L (ref 3–16)
AST SERPL-CCNC: 33 U/L (ref 15–37)
BACTERIA URNS QL MICRO: ABNORMAL /HPF
BASOPHILS # BLD: 0 K/UL (ref 0–0.2)
BASOPHILS NFR BLD: 0.5 %
BILIRUB SERPL-MCNC: <0.2 MG/DL (ref 0–1)
BILIRUB UR QL STRIP.AUTO: ABNORMAL
BUN SERPL-MCNC: 12 MG/DL (ref 7–20)
CALCIUM SERPL-MCNC: 8.2 MG/DL (ref 8.3–10.6)
CHLORIDE SERPL-SCNC: 106 MMOL/L (ref 99–110)
CLARITY UR: ABNORMAL
CO2 SERPL-SCNC: 21 MMOL/L (ref 21–32)
COLOR UR: YELLOW
CREAT SERPL-MCNC: 0.6 MG/DL (ref 0.6–1.1)
DEPRECATED RDW RBC AUTO: 13.7 % (ref 12.4–15.4)
EOSINOPHIL # BLD: 0 K/UL (ref 0–0.6)
EOSINOPHIL NFR BLD: 1.5 %
EPI CELLS #/AREA URNS HPF: ABNORMAL /HPF (ref 0–5)
FLUAV RNA RESP QL NAA+PROBE: NOT DETECTED
FLUBV RNA RESP QL NAA+PROBE: NOT DETECTED
GFR SERPLBLD CREATININE-BSD FMLA CKD-EPI: >90 ML/MIN/{1.73_M2}
GLUCOSE SERPL-MCNC: 92 MG/DL (ref 70–99)
GLUCOSE UR STRIP.AUTO-MCNC: NEGATIVE MG/DL
HCG UR QL: NEGATIVE
HCT VFR BLD AUTO: 32.3 % (ref 36–48)
HGB BLD-MCNC: 10.7 G/DL (ref 12–16)
HGB UR QL STRIP.AUTO: NEGATIVE
KETONES UR STRIP.AUTO-MCNC: NEGATIVE MG/DL
LACTATE BLDV-SCNC: 0.9 MMOL/L (ref 0.4–2)
LEUKOCYTE ESTERASE UR QL STRIP.AUTO: ABNORMAL
LYMPHOCYTES # BLD: 1.4 K/UL (ref 1–5.1)
LYMPHOCYTES NFR BLD: 44 %
MCH RBC QN AUTO: 28.5 PG (ref 26–34)
MCHC RBC AUTO-ENTMCNC: 33.3 G/DL (ref 31–36)
MCV RBC AUTO: 85.5 FL (ref 80–100)
MONOCYTES # BLD: 0.2 K/UL (ref 0–1.3)
MONOCYTES NFR BLD: 7.3 %
NEUTROPHILS # BLD: 1.4 K/UL (ref 1.7–7.7)
NEUTROPHILS NFR BLD: 46.7 %
NITRITE UR QL STRIP.AUTO: NEGATIVE
PH UR STRIP.AUTO: 7 [PH] (ref 5–8)
PLATELET # BLD AUTO: 206 K/UL (ref 135–450)
PMV BLD AUTO: 9.1 FL (ref 5–10.5)
POTASSIUM SERPL-SCNC: 4.6 MMOL/L (ref 3.5–5.1)
PROT SERPL-MCNC: 7 G/DL (ref 6.4–8.2)
PROT UR STRIP.AUTO-MCNC: NEGATIVE MG/DL
RBC # BLD AUTO: 3.77 M/UL (ref 4–5.2)
RBC #/AREA URNS HPF: ABNORMAL /HPF (ref 0–4)
SARS-COV-2 RNA RESP QL NAA+PROBE: NOT DETECTED
SODIUM SERPL-SCNC: 139 MMOL/L (ref 136–145)
SP GR UR STRIP.AUTO: 1.02 (ref 1–1.03)
TSH SERPL DL<=0.005 MIU/L-ACNC: 1.52 UIU/ML (ref 0.27–4.2)
UA COMPLETE W REFLEX CULTURE PNL UR: YES
UA DIPSTICK W REFLEX MICRO PNL UR: YES
URN SPEC COLLECT METH UR: ABNORMAL
UROBILINOGEN UR STRIP-ACNC: 2 E.U./DL
WBC # BLD AUTO: 3.1 K/UL (ref 4–11)
WBC #/AREA URNS HPF: ABNORMAL /HPF (ref 0–5)

## 2025-04-14 PROCEDURE — 36415 COLL VENOUS BLD VENIPUNCTURE: CPT

## 2025-04-14 PROCEDURE — 82607 VITAMIN B-12: CPT

## 2025-04-14 PROCEDURE — 84443 ASSAY THYROID STIM HORMONE: CPT

## 2025-04-14 PROCEDURE — 80053 COMPREHEN METABOLIC PANEL: CPT

## 2025-04-14 PROCEDURE — 87636 SARSCOV2 & INF A&B AMP PRB: CPT

## 2025-04-14 PROCEDURE — 87086 URINE CULTURE/COLONY COUNT: CPT

## 2025-04-14 PROCEDURE — 93005 ELECTROCARDIOGRAM TRACING: CPT | Performed by: STUDENT IN AN ORGANIZED HEALTH CARE EDUCATION/TRAINING PROGRAM

## 2025-04-14 PROCEDURE — 83605 ASSAY OF LACTIC ACID: CPT

## 2025-04-14 PROCEDURE — 84703 CHORIONIC GONADOTROPIN ASSAY: CPT

## 2025-04-14 PROCEDURE — 2060000000 HC ICU INTERMEDIATE R&B

## 2025-04-14 PROCEDURE — 85025 COMPLETE CBC W/AUTO DIFF WBC: CPT

## 2025-04-14 PROCEDURE — 82746 ASSAY OF FOLIC ACID SERUM: CPT

## 2025-04-14 PROCEDURE — 83550 IRON BINDING TEST: CPT

## 2025-04-14 PROCEDURE — 81001 URINALYSIS AUTO W/SCOPE: CPT

## 2025-04-14 PROCEDURE — 99285 EMERGENCY DEPT VISIT HI MDM: CPT

## 2025-04-14 PROCEDURE — 83540 ASSAY OF IRON: CPT

## 2025-04-14 ASSESSMENT — LIFESTYLE VARIABLES
HOW OFTEN DO YOU HAVE A DRINK CONTAINING ALCOHOL: NEVER
HOW MANY STANDARD DRINKS CONTAINING ALCOHOL DO YOU HAVE ON A TYPICAL DAY: PATIENT DOES NOT DRINK

## 2025-04-14 ASSESSMENT — PAIN - FUNCTIONAL ASSESSMENT: PAIN_FUNCTIONAL_ASSESSMENT: NONE - DENIES PAIN

## 2025-04-14 NOTE — TELEPHONE ENCOUNTER
Spoke with pt, and let her know. She then sts this has been going on all night where bp has been very low like this. She was advised to go to ed, she v/u.

## 2025-04-14 NOTE — TELEPHONE ENCOUNTER
Patient  calling into office. Reports patient BP last night 65/36. Symptoms noted with out of the bed were disoriented and unsteady on feet. He she is taking midodrine 2.5 mg TID, last dose was scheduled at 4 pm. He reports she has been taking midodrine 2.5 mg later in the evening to try and keep blood pressure increased, however this is not working. BP during the day is running 90/60's. Medicaiton list clean up and accurate. Patient is taking esketamine nasal spray weekly for over a year per  added to medicaiton list.

## 2025-04-14 NOTE — TELEPHONE ENCOUNTER
Significantly low BP. She should be directed to ED to rule out contributing factors like dehydration, sepsis. From there, we can expedite testing to assess for cammie's disease as well (we've been trying to arrange an ACTH stim test OP).

## 2025-04-15 PROBLEM — I95.9 HYPOTENSION: Status: ACTIVE | Noted: 2025-02-24

## 2025-04-15 LAB
ANION GAP SERPL CALCULATED.3IONS-SCNC: 10 MMOL/L (ref 3–16)
BUN SERPL-MCNC: 9 MG/DL (ref 7–20)
CALCIUM SERPL-MCNC: 8.7 MG/DL (ref 8.3–10.6)
CHLORIDE SERPL-SCNC: 108 MMOL/L (ref 99–110)
CO2 SERPL-SCNC: 21 MMOL/L (ref 21–32)
CREAT SERPL-MCNC: 0.5 MG/DL (ref 0.6–1.1)
DEPRECATED RDW RBC AUTO: 13.5 % (ref 12.4–15.4)
EKG ATRIAL RATE: 65 BPM
EKG DIAGNOSIS: NORMAL
EKG P AXIS: 83 DEGREES
EKG P-R INTERVAL: 138 MS
EKG Q-T INTERVAL: 414 MS
EKG QRS DURATION: 88 MS
EKG QTC CALCULATION (BAZETT): 430 MS
EKG R AXIS: 57 DEGREES
EKG T AXIS: 84 DEGREES
EKG VENTRICULAR RATE: 65 BPM
FOLATE SERPL-MCNC: 11.5 NG/ML (ref 4.78–24.2)
GFR SERPLBLD CREATININE-BSD FMLA CKD-EPI: >90 ML/MIN/{1.73_M2}
GLUCOSE SERPL-MCNC: 86 MG/DL (ref 70–99)
HCT VFR BLD AUTO: 32.5 % (ref 36–48)
HGB BLD-MCNC: 10.9 G/DL (ref 12–16)
IRON SATN MFR SERPL: 17 % (ref 15–50)
IRON SERPL-MCNC: 61 UG/DL (ref 37–145)
MAGNESIUM SERPL-MCNC: 2.19 MG/DL (ref 1.8–2.4)
MCH RBC QN AUTO: 28.6 PG (ref 26–34)
MCHC RBC AUTO-ENTMCNC: 33.6 G/DL (ref 31–36)
MCV RBC AUTO: 85.2 FL (ref 80–100)
PHOSPHATE SERPL-MCNC: 2.2 MG/DL (ref 2.5–4.9)
PLATELET # BLD AUTO: 174 K/UL (ref 135–450)
PMV BLD AUTO: 8.3 FL (ref 5–10.5)
POTASSIUM SERPL-SCNC: 4.1 MMOL/L (ref 3.5–5.1)
RBC # BLD AUTO: 3.81 M/UL (ref 4–5.2)
SODIUM SERPL-SCNC: 139 MMOL/L (ref 136–145)
TIBC SERPL-MCNC: 369 UG/DL (ref 260–445)
VIT B12 SERPL-MCNC: 720 PG/ML (ref 211–911)
WBC # BLD AUTO: 2.4 K/UL (ref 4–11)

## 2025-04-15 PROCEDURE — 85027 COMPLETE CBC AUTOMATED: CPT

## 2025-04-15 PROCEDURE — 6360000002 HC RX W HCPCS

## 2025-04-15 PROCEDURE — 99232 SBSQ HOSP IP/OBS MODERATE 35: CPT

## 2025-04-15 PROCEDURE — 82024 ASSAY OF ACTH: CPT

## 2025-04-15 PROCEDURE — 6360000002 HC RX W HCPCS: Performed by: STUDENT IN AN ORGANIZED HEALTH CARE EDUCATION/TRAINING PROGRAM

## 2025-04-15 PROCEDURE — 2580000003 HC RX 258: Performed by: STUDENT IN AN ORGANIZED HEALTH CARE EDUCATION/TRAINING PROGRAM

## 2025-04-15 PROCEDURE — 80048 BASIC METABOLIC PNL TOTAL CA: CPT

## 2025-04-15 PROCEDURE — 83735 ASSAY OF MAGNESIUM: CPT

## 2025-04-15 PROCEDURE — 36415 COLL VENOUS BLD VENIPUNCTURE: CPT

## 2025-04-15 PROCEDURE — 2580000003 HC RX 258

## 2025-04-15 PROCEDURE — 93010 ELECTROCARDIOGRAM REPORT: CPT | Performed by: INTERNAL MEDICINE

## 2025-04-15 PROCEDURE — 84100 ASSAY OF PHOSPHORUS: CPT

## 2025-04-15 PROCEDURE — 6370000000 HC RX 637 (ALT 250 FOR IP): Performed by: STUDENT IN AN ORGANIZED HEALTH CARE EDUCATION/TRAINING PROGRAM

## 2025-04-15 PROCEDURE — 2060000000 HC ICU INTERMEDIATE R&B

## 2025-04-15 RX ORDER — POTASSIUM CHLORIDE 7.45 MG/ML
10 INJECTION INTRAVENOUS PRN
Status: DISCONTINUED | OUTPATIENT
Start: 2025-04-15 | End: 2025-04-16 | Stop reason: HOSPADM

## 2025-04-15 RX ORDER — COSYNTROPIN 0.25 MG/ML
250 INJECTION, POWDER, FOR SOLUTION INTRAMUSCULAR; INTRAVENOUS ONCE
Status: COMPLETED | OUTPATIENT
Start: 2025-04-16 | End: 2025-04-16

## 2025-04-15 RX ORDER — ACETAMINOPHEN 650 MG/1
650 SUPPOSITORY RECTAL EVERY 6 HOURS PRN
Status: DISCONTINUED | OUTPATIENT
Start: 2025-04-15 | End: 2025-04-16 | Stop reason: HOSPADM

## 2025-04-15 RX ORDER — ONDANSETRON 4 MG/1
4 TABLET, ORALLY DISINTEGRATING ORAL EVERY 8 HOURS PRN
Status: DISCONTINUED | OUTPATIENT
Start: 2025-04-15 | End: 2025-04-16 | Stop reason: HOSPADM

## 2025-04-15 RX ORDER — GABAPENTIN 300 MG/1
300 CAPSULE ORAL ONCE
Status: COMPLETED | OUTPATIENT
Start: 2025-04-15 | End: 2025-04-15

## 2025-04-15 RX ORDER — CLONAZEPAM 1 MG/1
1 TABLET ORAL 3 TIMES DAILY
Status: DISCONTINUED | OUTPATIENT
Start: 2025-04-15 | End: 2025-04-16 | Stop reason: HOSPADM

## 2025-04-15 RX ORDER — GABAPENTIN 300 MG/1
300 CAPSULE ORAL 3 TIMES DAILY
Status: DISCONTINUED | OUTPATIENT
Start: 2025-04-15 | End: 2025-04-16 | Stop reason: HOSPADM

## 2025-04-15 RX ORDER — GLUCAGON 1 MG/ML
1 KIT INJECTION PRN
Status: DISCONTINUED | OUTPATIENT
Start: 2025-04-15 | End: 2025-04-16 | Stop reason: HOSPADM

## 2025-04-15 RX ORDER — ACETAMINOPHEN 325 MG/1
650 TABLET ORAL EVERY 6 HOURS PRN
Status: DISCONTINUED | OUTPATIENT
Start: 2025-04-15 | End: 2025-04-16 | Stop reason: HOSPADM

## 2025-04-15 RX ORDER — SODIUM CHLORIDE 9 MG/ML
INJECTION, SOLUTION INTRAVENOUS PRN
Status: DISCONTINUED | OUTPATIENT
Start: 2025-04-15 | End: 2025-04-16 | Stop reason: HOSPADM

## 2025-04-15 RX ORDER — CLONAZEPAM 0.5 MG/1
1 TABLET ORAL ONCE
Status: COMPLETED | OUTPATIENT
Start: 2025-04-15 | End: 2025-04-15

## 2025-04-15 RX ORDER — ENOXAPARIN SODIUM 100 MG/ML
30 INJECTION SUBCUTANEOUS DAILY
Status: DISCONTINUED | OUTPATIENT
Start: 2025-04-15 | End: 2025-04-16 | Stop reason: HOSPADM

## 2025-04-15 RX ORDER — MAGNESIUM SULFATE IN WATER 40 MG/ML
2000 INJECTION, SOLUTION INTRAVENOUS PRN
Status: DISCONTINUED | OUTPATIENT
Start: 2025-04-15 | End: 2025-04-16 | Stop reason: HOSPADM

## 2025-04-15 RX ORDER — SODIUM CHLORIDE 0.9 % (FLUSH) 0.9 %
5-40 SYRINGE (ML) INJECTION PRN
Status: DISCONTINUED | OUTPATIENT
Start: 2025-04-15 | End: 2025-04-16 | Stop reason: HOSPADM

## 2025-04-15 RX ORDER — POTASSIUM CHLORIDE 1500 MG/1
40 TABLET, EXTENDED RELEASE ORAL PRN
Status: DISCONTINUED | OUTPATIENT
Start: 2025-04-15 | End: 2025-04-16 | Stop reason: HOSPADM

## 2025-04-15 RX ORDER — MIDODRINE HYDROCHLORIDE 5 MG/1
2.5 TABLET ORAL
Status: DISCONTINUED | OUTPATIENT
Start: 2025-04-15 | End: 2025-04-16 | Stop reason: HOSPADM

## 2025-04-15 RX ORDER — DEXTROSE MONOHYDRATE 100 MG/ML
INJECTION, SOLUTION INTRAVENOUS CONTINUOUS PRN
Status: DISCONTINUED | OUTPATIENT
Start: 2025-04-15 | End: 2025-04-16 | Stop reason: HOSPADM

## 2025-04-15 RX ORDER — SODIUM CHLORIDE 0.9 % (FLUSH) 0.9 %
5-40 SYRINGE (ML) INJECTION EVERY 12 HOURS SCHEDULED
Status: DISCONTINUED | OUTPATIENT
Start: 2025-04-15 | End: 2025-04-16 | Stop reason: HOSPADM

## 2025-04-15 RX ORDER — SODIUM CHLORIDE, SODIUM LACTATE, POTASSIUM CHLORIDE, CALCIUM CHLORIDE 600; 310; 30; 20 MG/100ML; MG/100ML; MG/100ML; MG/100ML
INJECTION, SOLUTION INTRAVENOUS CONTINUOUS
Status: DISCONTINUED | OUTPATIENT
Start: 2025-04-15 | End: 2025-04-16 | Stop reason: HOSPADM

## 2025-04-15 RX ORDER — ONDANSETRON 2 MG/ML
4 INJECTION INTRAMUSCULAR; INTRAVENOUS EVERY 6 HOURS PRN
Status: DISCONTINUED | OUTPATIENT
Start: 2025-04-15 | End: 2025-04-16 | Stop reason: HOSPADM

## 2025-04-15 RX ORDER — POLYETHYLENE GLYCOL 3350 17 G/17G
17 POWDER, FOR SOLUTION ORAL DAILY PRN
Status: DISCONTINUED | OUTPATIENT
Start: 2025-04-15 | End: 2025-04-16 | Stop reason: HOSPADM

## 2025-04-15 RX ADMIN — SODIUM CHLORIDE, SODIUM LACTATE, POTASSIUM CHLORIDE, AND CALCIUM CHLORIDE: .6; .31; .03; .02 INJECTION, SOLUTION INTRAVENOUS at 11:21

## 2025-04-15 RX ADMIN — CLONAZEPAM 1 MG: 0.5 TABLET ORAL at 08:43

## 2025-04-15 RX ADMIN — ACETAMINOPHEN 650 MG: 325 TABLET ORAL at 18:51

## 2025-04-15 RX ADMIN — GABAPENTIN 300 MG: 300 CAPSULE ORAL at 20:24

## 2025-04-15 RX ADMIN — CLONAZEPAM 1 MG: 0.5 TABLET ORAL at 20:24

## 2025-04-15 RX ADMIN — SODIUM CHLORIDE, SODIUM LACTATE, POTASSIUM CHLORIDE, AND CALCIUM CHLORIDE: .6; .31; .03; .02 INJECTION, SOLUTION INTRAVENOUS at 00:42

## 2025-04-15 RX ADMIN — SODIUM CHLORIDE 1000 MG: 9 INJECTION, SOLUTION INTRAVENOUS at 08:56

## 2025-04-15 RX ADMIN — GABAPENTIN 300 MG: 300 CAPSULE ORAL at 14:19

## 2025-04-15 RX ADMIN — GABAPENTIN 300 MG: 300 CAPSULE ORAL at 08:43

## 2025-04-15 RX ADMIN — SODIUM CHLORIDE, SODIUM LACTATE, POTASSIUM CHLORIDE, AND CALCIUM CHLORIDE: .6; .31; .03; .02 INJECTION, SOLUTION INTRAVENOUS at 22:12

## 2025-04-15 RX ADMIN — CLONAZEPAM 1 MG: 0.5 TABLET ORAL at 01:07

## 2025-04-15 RX ADMIN — MIDODRINE HYDROCHLORIDE 2.5 MG: 5 TABLET ORAL at 16:39

## 2025-04-15 RX ADMIN — CLONAZEPAM 1 MG: 0.5 TABLET ORAL at 14:20

## 2025-04-15 RX ADMIN — GABAPENTIN 300 MG: 300 CAPSULE ORAL at 01:07

## 2025-04-15 ASSESSMENT — LIFESTYLE VARIABLES
HOW MANY STANDARD DRINKS CONTAINING ALCOHOL DO YOU HAVE ON A TYPICAL DAY: PATIENT DOES NOT DRINK
HOW OFTEN DO YOU HAVE A DRINK CONTAINING ALCOHOL: NEVER

## 2025-04-15 ASSESSMENT — PAIN DESCRIPTION - LOCATION: LOCATION: HEAD

## 2025-04-15 NOTE — CARE COORDINATION
Case Management Assessment  Initial Evaluation    Date/Time of Evaluation: 4/15/2025 3:12 PM  Assessment Completed by: ALFREDO Castro    If patient is discharged prior to next notation, then this note serves as note for discharge by case management.    Patient Name: Josue Almeida                   YOB: 1990  Diagnosis: Hypotension [I95.9]  Transient hypotension [I95.9]                   Date / Time: 4/14/2025  6:11 PM    Patient Admission Status: Inpatient   Readmission Risk (Low < 19, Mod (19-27), High > 27): Readmission Risk Score: 6.2    Current PCP: No primary care provider on file.  PCP verified by CM? Yes    Chart Reviewed: Yes      History Provided by: Patient, Medical Record  Patient Orientation: Alert and Oriented    Patient Cognition: Alert    Hospitalization in the last 30 days (Readmission):  No    If yes, Readmission Assessment in CM Navigator will be completed.    Advance Directives:      Code Status: Full Code   Patient's Primary Decision Maker is: Legal Next of Kin      Discharge Planning:    Patient lives with: Spouse/Significant Other, Children Type of Home: House  Primary Care Giver: Self  Patient Support Systems include: Spouse/Significant Other, Children   Current Financial resources: Medicaid  Current community resources: None  Current services prior to admission: None            Current DME:              Type of Home Care services:  None    ADLS  Prior functional level: Independent in ADLs/IADLs  Current functional level: Independent in ADLs/IADLs    PT AM-PAC:   /24  OT AM-PAC:   /24    Family can provide assistance at DC: Yes  Would you like Case Management to discuss the discharge plan with any other family members/significant others, and if so, who? No  Plans to Return to Present Housing: Yes  Other Identified Issues/Barriers to RETURNING to current housing: Hypotension  Potential Assistance needed at discharge: N/A            Potential DME:    Patient

## 2025-04-15 NOTE — FLOWSHEET NOTE
04/15/25 1300   Vital Signs   Temp 97.3 °F (36.3 °C)   Temp Source Oral   Pulse 66   Respirations 16   /76   MAP (Calculated) 88   BP Location Right upper arm   BP Method Automatic   Patient Position High fowlers   Pain Assessment   Pain Assessment None - Denies Pain   Care Plan - Pain Goals   Verbalizes/displays adequate comfort level or baseline comfort level Encourage patient to monitor pain and request assistance   Opioid-Induced Sedation   POSS Score 1   RASS   Luna Agitation Sedation Scale (RASS) 0   Oxygen Therapy   SpO2 99 %   Pulse Oximetry Type Intermittent     Patient arrived to 324 from ED. Admission and assessment completed. Patient is on RA and denies further needs. Call light within reach.

## 2025-04-15 NOTE — PROGRESS NOTES
Progress Note    Admit Date:  4/14/2025    Presented and admitted for hypotension and generalized weakness- concern for Pacific's Disease.     Subjective:  Ms. Santosh Almeida is resting in bed.   She admits to recent steroid use about one month ago, but states she only took one dose of it.   She notices the decreased blood pressure and heart rate typically around 4 to 5 PM. She states that when she falls asleep, she is not sure if she is passing out or falling asleep.   When she wakes up, her BP at home is typically around 90/60.   She also reports recent weight loss of about 15 pounds in the past month.   She states that she has a large spinal cyst for which she has followed up with Becky, but was told it was not operable.     Objective:   BP (!) 108/48   Pulse (!) 47   Temp 98 °F (36.7 °C) (Oral)   Resp 17   Ht 1.6 m (5' 3\")   Wt 47.6 kg (105 lb)   SpO2 100%   BMI 18.60 kg/m²        Intake/Output Summary (Last 24 hours) at 4/15/2025 0756  Last data filed at 4/15/2025 0606  Gross per 24 hour   Intake 0 ml   Output --   Net 0 ml       Physical Exam:   Gen: No distress. Alert. Pleasant.   Eyes: PERRL. No sclera icterus. No conjunctival injection.   ENT: No discharge. + left eye esotropia.  Neck: No JVD. Trachea midline.  Resp: No accessory muscle use. No crackles. No wheezes. No rhonchi.   CV: Regular rate. Regular rhythm. No murmur.  No rub. No edema.   Capillary Refill: Brisk,< 3 seconds   Peripheral Pulses: +2 palpable, equal bilaterally   GI: Non-tender. Non-distended. No masses. No organomegaly. Normal bowel sounds. No hernia.   Skin: Warm and dry. No nodule on exposed extremities. No rash on exposed extremities.   M/S: No cyanosis. No joint deformity. No clubbing.   Neuro: Awake. Grossly nonfocal    Psych: Oriented x 3. No anxiety or agitation.         Medications:  sodium chloride flush, 5-40 mL, 2 times per day  enoxaparin, 30 mg, Daily  clonazePAM, 1 mg, TID  gabapentin, 300 mg, TID  midodrine,

## 2025-04-15 NOTE — H&P
ordered  [x] Collateral history obtained     [] Independent Interpretation of tests (any 1)    [] Telemetry (Rhythm Strip) personally reviewed and interpreted        [] Imaging personally reviewed and interpreted     [] Discussion (any 1)  [] Multi-Disciplinary Rounds with Case Management  [] Discussed management of the case with    The billing in this case is level 3 due to the above       Goals status was discussed in detail with patient.  Verbalized understanding of different options of CODE STATUS.  Patient opted for full code.    Comment: Please note this report has been produced using speech recognition software and may contain errors related to that system including errors in grammar, punctuation, and spelling, as well as words and phrases that may be inappropriate. If there are any questions or concerns please feel free to contact the dictating provider for clarification.     Disposition:   Current Living situation: Home  Expected Disposition: Home  Estimated D/C: 1 to 2 days    Diet No diet orders on file   DVT Prophylaxis [] Lovenox, []  Heparin, [] SCDs, [] Ambulation,  [] Eliquis, [] Xarelto   Code Status Prior   Surrogate Decision Maker/ POA      History from:     patient    History of Present Illness:     Chief Complaint: Hypotension  Josue Almeida is a 34 y.o. female who presents with generalized weakness hypotension episodes going on since July last year patient reports that every time during the day she has energy but by the end of the day her energy comes down and down and down to the point ultimately she feels like her blood pressure drops low-dose blood pressure that they have monitored at home is around 70 x 40.  Patient feels very weak and lethargic and mostly feels like collapsing at nighttime.  Denies any nausea monitoring constipation dizziness lightheadedness leg pain chest pain orthopnea and PND.     Review of Systems: Need 10 Elements   Review of Systems  Negative except

## 2025-04-15 NOTE — PROGRESS NOTES
Bedside report and transfer of care given to SUMANTH Keenan. Pt currently resting in bed with the call light within reach. Pt denies any other care needs at this time. Pt stable at this time.

## 2025-04-15 NOTE — FLOWSHEET NOTE
04/15/25 0026   Assessment   Charting Type Admission   Psychosocial   Psychosocial (WDL) WDL   Neurological   Level of Consciousness 0   Neuro (WDL) WDL   Ben Coma Scale   Eye Opening 4   Best Verbal Response 5   Best Motor Response 6   Gunpowder Coma Scale Score 15   HEENT (Head, Ears, Eyes, Nose, & Throat)   HEENT (WDL) WDL   Respiratory   Respiratory (WDL) WDL   Cardiac   Cardiac Rhythm Sinus michelle   Cardiac (WDL) X   Gastrointestinal   Abdominal (WDL) WDL   Genitourinary   Genitourinary (WDL) WDL   Peripheral Vascular   Peripheral Vascular (WDL) WDL   Dual Clinician Skin Assessment   Dual Skin Assessment (4 Eyes) WDL   Skin Integumentary    Skin Integumentary (WDL) WDL   Musculoskeletal   Musculoskeletal (WDL) WDL

## 2025-04-15 NOTE — PROGRESS NOTES
Patient admitted to room 324 from ED. Patient oriented to room, call light, bed rails, phone, lights and bathroom. Patient instructed about the schedule of the day including: vital sign frequency, lab draws, possible tests, frequency of MD and staff rounds, daily weights, I &O's and prescribed diet.  Telemetry box in place, patient aware of placement and reason. Bed locked, in lowest position, side rails up 2/4, call light within reach.        Recliner Assessment  Patient is able to demonstrate the ability to move from a reclining position to an upright position within the recliner.       4 Eyes Skin Assessment     NAME:  Josue Almeida  YOB: 1990  MEDICAL RECORD NUMBER:  [id number]    The patient is being assessed for  Admission    I agree that at least one RN has performed a thorough Head to Toe Skin Assessment on the patient. ALL assessment sites listed below have been assessed.      Areas assessed by both nurses:    Head, Face, Ears, Shoulders, Back, Chest, Arms, Elbows, Hands, Sacrum. Buttock, Coccyx, Ischium, Legs. Feet and Heels, and Under Medical Devices         Does the Patient have a Wound? No noted wound(s)       Tim Prevention initiated by RN: No  Wound Care Orders initiated by RN: No    Pressure Injury (Stage 3,4, Unstageable, DTI, NWPT, and Complex wounds) if present, place Wound referral order by RN under : No    New Ostomies, if present place, Ostomy referral order under : No     Nurse 1 eSignature: Electronically signed by Katie Brown RN on 4/15/25 at 7:35 PM EDT    **SHARE this note so that the co-signing nurse can place an eSignature**    Nurse 2 eSignature: {Esignature:[id number]}

## 2025-04-15 NOTE — FLOWSHEET NOTE
04/15/25 1604   Vital Signs   Temp 97.9 °F (36.6 °C)   Temp Source Oral   Pulse 62   Heart Rate Source Monitor   Respirations 16   /69   MAP (Calculated) 80   BP Location Left upper arm   BP Method Automatic   Patient Position Up in chair   Oxygen Therapy   SpO2 100 %   O2 Device None (Room air)     Patient remains stable. Call light within reach.

## 2025-04-15 NOTE — ED PROVIDER NOTES
[facility] EMERGENCY DEPARTMENT      CHIEF COMPLAINT  Hypotension (The patient presents to the ER stating that she gets hypotensive every night. She is 114/88 in triage. )       HISTORY OF PRESENT ILLNESS  Josue Almeida is a 34 y.o. female  who presents to the ED complaining of episodic hypotension.  Patient states that she is been having episodes of severe hypotension on a regular basis for the last few months.  Has been getting worked up as an outpatient and states that her cardiologist, Dr. Prince is concerned for Siva's disease.  Reports that she has been having blood pressures 60s over 30s at night.  Has also been having poor oral intake due to nausea and anorexia.  Denies any significant chest pain, shortness of breath, abdominal pain.    No other complaints, modifying factors or associated symptoms.     I have reviewed the following from the nursing documentation.    Past Medical History:   Diagnosis Date    Anemia     Anxiety     Chronic fatigue 2025    Chronic hypotension 2025    Depression     no meds    History of recurrent UTI (urinary tract infection)     Infections of kidney     Kidney stone 2011    Miscarriage     Other disorders of kidney and ureter     2 ureters on R side    Status post cystoscopy 2011    Weakness 2025     Past Surgical History:   Procedure Laterality Date     SECTION  2014    X 2;  & ,  2015    CYSTOSCOPY  [id number]    [facility]    EYE SURGERY      EYE SURGERY       Family History   Problem Relation Age of Onset    Kidney Disease Mother     Crohn's Disease Father     Urolithiasis Father     Urolithiasis Sister     Diabetes Maternal Grandmother     Heart Disease Maternal Grandmother     Diabetes Paternal Grandmother     Diabetes Paternal Grandfather     Heart Disease Paternal Grandfather      Social History     Socioeconomic History    Marital status:      Spouse name: Not on file    Number of children: Not on

## 2025-04-16 VITALS
OXYGEN SATURATION: 98 % | TEMPERATURE: 97.9 F | BODY MASS INDEX: 18.61 KG/M2 | HEIGHT: 63 IN | HEART RATE: 81 BPM | DIASTOLIC BLOOD PRESSURE: 60 MMHG | SYSTOLIC BLOOD PRESSURE: 105 MMHG | WEIGHT: 105 LBS | RESPIRATION RATE: 20 BRPM

## 2025-04-16 LAB
ANION GAP SERPL CALCULATED.3IONS-SCNC: 10 MMOL/L (ref 3–16)
BACTERIA UR CULT: NORMAL
BUN SERPL-MCNC: 6 MG/DL (ref 7–20)
CALCIUM SERPL-MCNC: 8.7 MG/DL (ref 8.3–10.6)
CHLORIDE SERPL-SCNC: 108 MMOL/L (ref 99–110)
CO2 SERPL-SCNC: 20 MMOL/L (ref 21–32)
CORTIS 1H P 250 UG ACTH RIV SERPL-MCNC: 23.9 UG/DL
CORTIS 30M P 250 UG ACTH RIV SERPL-MCNC: 18.5 UG/DL
CORTIS SERPL-MCNC: 9.1 UG/DL
CREAT SERPL-MCNC: 0.5 MG/DL (ref 0.6–1.1)
DEPRECATED RDW RBC AUTO: 13.2 % (ref 12.4–15.4)
GFR SERPLBLD CREATININE-BSD FMLA CKD-EPI: >90 ML/MIN/{1.73_M2}
GLUCOSE SERPL-MCNC: 83 MG/DL (ref 70–99)
HCT VFR BLD AUTO: 32.4 % (ref 36–48)
HGB BLD-MCNC: 10.9 G/DL (ref 12–16)
MAGNESIUM SERPL-MCNC: 2.01 MG/DL (ref 1.8–2.4)
MCH RBC QN AUTO: 28.7 PG (ref 26–34)
MCHC RBC AUTO-ENTMCNC: 33.8 G/DL (ref 31–36)
MCV RBC AUTO: 85.1 FL (ref 80–100)
PHOSPHATE SERPL-MCNC: 2.6 MG/DL (ref 2.5–4.9)
PLATELET # BLD AUTO: 178 K/UL (ref 135–450)
PMV BLD AUTO: 8.4 FL (ref 5–10.5)
POTASSIUM SERPL-SCNC: 4.3 MMOL/L (ref 3.5–5.1)
RBC # BLD AUTO: 3.8 M/UL (ref 4–5.2)
SODIUM SERPL-SCNC: 138 MMOL/L (ref 136–145)
WBC # BLD AUTO: 2.6 K/UL (ref 4–11)

## 2025-04-16 PROCEDURE — 84100 ASSAY OF PHOSPHORUS: CPT

## 2025-04-16 PROCEDURE — 80048 BASIC METABOLIC PNL TOTAL CA: CPT

## 2025-04-16 PROCEDURE — 83735 ASSAY OF MAGNESIUM: CPT

## 2025-04-16 PROCEDURE — 80400 ACTH STIMULATION PANEL: CPT

## 2025-04-16 PROCEDURE — 99238 HOSP IP/OBS DSCHRG MGMT 30/<: CPT | Performed by: INTERNAL MEDICINE

## 2025-04-16 PROCEDURE — 36415 COLL VENOUS BLD VENIPUNCTURE: CPT

## 2025-04-16 PROCEDURE — 6360000002 HC RX W HCPCS: Performed by: STUDENT IN AN ORGANIZED HEALTH CARE EDUCATION/TRAINING PROGRAM

## 2025-04-16 PROCEDURE — 2580000003 HC RX 258

## 2025-04-16 PROCEDURE — 6370000000 HC RX 637 (ALT 250 FOR IP): Performed by: STUDENT IN AN ORGANIZED HEALTH CARE EDUCATION/TRAINING PROGRAM

## 2025-04-16 PROCEDURE — 2580000003 HC RX 258: Performed by: STUDENT IN AN ORGANIZED HEALTH CARE EDUCATION/TRAINING PROGRAM

## 2025-04-16 PROCEDURE — 6360000002 HC RX W HCPCS

## 2025-04-16 PROCEDURE — 85027 COMPLETE CBC AUTOMATED: CPT

## 2025-04-16 RX ADMIN — GABAPENTIN 300 MG: 300 CAPSULE ORAL at 15:22

## 2025-04-16 RX ADMIN — MIDODRINE HYDROCHLORIDE 2.5 MG: 5 TABLET ORAL at 12:39

## 2025-04-16 RX ADMIN — CLONAZEPAM 1 MG: 0.5 TABLET ORAL at 15:22

## 2025-04-16 RX ADMIN — MIDODRINE HYDROCHLORIDE 2.5 MG: 5 TABLET ORAL at 09:31

## 2025-04-16 RX ADMIN — COSYNTROPIN 250 MCG: 0.25 INJECTION, POWDER, LYOPHILIZED, FOR SOLUTION INTRAMUSCULAR; INTRAVENOUS at 08:03

## 2025-04-16 RX ADMIN — ENOXAPARIN SODIUM 30 MG: 100 INJECTION SUBCUTANEOUS at 09:35

## 2025-04-16 RX ADMIN — CLONAZEPAM 1 MG: 0.5 TABLET ORAL at 09:31

## 2025-04-16 RX ADMIN — SODIUM CHLORIDE, SODIUM LACTATE, POTASSIUM CHLORIDE, AND CALCIUM CHLORIDE: .6; .31; .03; .02 INJECTION, SOLUTION INTRAVENOUS at 06:02

## 2025-04-16 RX ADMIN — GABAPENTIN 300 MG: 300 CAPSULE ORAL at 09:34

## 2025-04-16 RX ADMIN — SODIUM CHLORIDE 1000 MG: 9 INJECTION, SOLUTION INTRAVENOUS at 09:30

## 2025-04-16 NOTE — PROGRESS NOTES
Pt in bed, eyes closed. No distress noted. Call light in reach. Will continue to monitor.  Ree Angel RN

## 2025-04-16 NOTE — FLOWSHEET NOTE
04/16/25 1645   Vital Signs   Temp 97.9 °F (36.6 °C)   Temp Source Oral   Pulse 81   Heart Rate Source Monitor   Respirations 20   /60   MAP (Calculated) 75   BP Location Left upper arm   BP Method Automatic   Patient Position Up in chair   Oxygen Therapy   SpO2 98 %   O2 Device None (Room air)     Patient remains stable. Call light within reach.

## 2025-04-16 NOTE — FLOWSHEET NOTE
04/16/25 1142   Vital Signs   Temp 97.9 °F (36.6 °C)   Temp Source Oral   Pulse 68   Heart Rate Source Monitor   Respirations 18   /71   MAP (Calculated) 83   BP Location Right upper arm   BP Method Automatic   Patient Position Semi fowlers   Oxygen Therapy   SpO2 98 %   O2 Device None (Room air)     Patient is awake and alert. Call light within reach.

## 2025-04-16 NOTE — PROGRESS NOTES
Pt in bed, awake, A/O X4. Shift assessment complete, night meds given. No C/o pain at this time.  . No other needs expressed. Call light in reach. Will monitor.  Ree Angel RN

## 2025-04-16 NOTE — PROGRESS NOTES
Progress Note    Admit Date:  4/14/2025    Presented and admitted for hypotension and generalized weakness- concern for Denver's Disease.     Subjective:  Ms. Santosh Almeida is resting in bed.   Feels better today  No dizziness     Objective:   /64   Pulse 60   Temp 98.7 °F (37.1 °C) (Oral)   Resp 18   Ht 1.6 m (5' 3\")   Wt 47.6 kg (105 lb)   SpO2 97%   BMI 18.60 kg/m²        Intake/Output Summary (Last 24 hours) at 4/16/2025 1029  Last data filed at 4/16/2025 0851  Gross per 24 hour   Intake 240 ml   Output --   Net 240 ml       Physical Exam:   Gen: No distress. Alert. Pleasant.   Eyes: PERRL. No sclera icterus. No conjunctival injection.   ENT: No discharge. + left eye esotropia.  Neck: No JVD. Trachea midline.  Resp: No accessory muscle use. No crackles. No wheezes. No rhonchi.   CV: Regular rate. Regular rhythm. No murmur.  No rub. No edema.   Capillary Refill: Brisk,< 3 seconds   Peripheral Pulses: +2 palpable, equal bilaterally   GI: Non-tender. Non-distended. No masses. No organomegaly. Normal bowel sounds. No hernia.   Skin: Warm and dry. No nodule on exposed extremities. No rash on exposed extremities.   M/S: No cyanosis. No joint deformity. No clubbing.   Neuro: Awake. Grossly nonfocal    Psych: Oriented x 3. No anxiety or agitation.         Medications:  sodium chloride flush, 5-40 mL, 2 times per day  enoxaparin, 30 mg, Daily  clonazePAM, 1 mg, TID  gabapentin, 300 mg, TID  midodrine, 2.5 mg, TID WC  cefTRIAXone (ROCEPHIN) IV, 1,000 mg, Q24H      PRN Medications:  glucose, 4 tablet, PRN  dextrose bolus, 125 mL, PRN   Or  dextrose bolus, 250 mL, PRN  glucagon (rDNA), 1 mg, PRN  dextrose, , Continuous PRN  sodium chloride flush, 5-40 mL, PRN  sodium chloride, , PRN  potassium chloride, 40 mEq, PRN   Or  potassium alternative oral replacement, 40 mEq, PRN   Or  potassium chloride, 10 mEq, PRN  magnesium sulfate, 2,000 mg, PRN  ondansetron, 4 mg, Q8H PRN   Or  ondansetron, 4 mg, Q6H

## 2025-04-16 NOTE — PROGRESS NOTES
Patient educated on discharge instructions as well as new medications use, dosage, administration and possible side effects.  Patient verified knowledge. IV removed without difficulty and dry dressing in place. Telemetry monitor removed and returned to [facility]. Pt left facility in stable condition to Home with all of their personal belongings.

## 2025-04-16 NOTE — FLOWSHEET NOTE
04/16/25 0747   Vital Signs   Temp 98.7 °F (37.1 °C)   Temp Source Oral   Pulse 60   Heart Rate Source Monitor   Respirations 18   /64   MAP (Calculated) 76   BP Location Right upper arm   BP Method Automatic   Patient Position Semi fowlers   Oxygen Therapy   SpO2 97 %   O2 Device None (Room air)     Shift assessment completed. See flow sheet. Medications given. Patient is A&O x4. Vitals are stable. Patient denies further needs. Call light within reach.

## 2025-04-16 NOTE — DISCHARGE SUMMARY
Name:  Josue Whitt  Room:  /0324-01  MRN:    [id number]    Discharge Summary      This discharge summary is in conjunction with a complete physical exam done on the day of discharge.      Discharging Physician: GUS GLASGOW MD      Admit: 4/14/2025  Discharge:  4/16/2025     Diagnoses this Admission    Principal Problem:    Transient hypotension  Active Problems:    Adjustment disorder with mixed anxiety and depressed mood    History of recurrent UTIs    Mood disorder    Anxiety disorder, unspecified    Depression, unspecified    Cannabis use disorder    Hypotension    Other fatigue    Chronic hypotension  Resolved Problems:    * No resolved hospital problems. *          Procedures (Please Review Full Report for Details)      Consults    None      HPI:  Josue Almeida is a 34 y.o. female who presents with generalized weakness hypotension episodes going on since July last year patient reports that every time during the day she has energy but by the end of the day her energy comes down and down and down to the point ultimately she feels like her blood pressure drops low-dose blood pressure that they have monitored at home is around 70 x 40.  Patient feels very weak and lethargic and mostly feels like collapsing at nighttime.  Denies any nausea monitoring constipation dizziness lightheadedness leg pain chest pain orthopnea and PND.       Physical Exam at Discharge:  /60   Pulse 81   Temp 97.9 °F (36.6 °C) (Oral)   Resp 20   Ht 1.6 m (5' 3\")   Wt 47.6 kg (105 lb)   SpO2 98%   BMI 18.60 kg/m²     Gen: No distress. Alert. Pleasant.   Eyes: PERRL. No sclera icterus. No conjunctival injection.   ENT: No discharge. + left eye esotropia.  Neck: No JVD. Trachea midline.  Resp: No accessory muscle use. No crackles. No wheezes. No rhonchi.   CV: Regular rate. Regular rhythm. No murmur.  No rub. No edema.   Capillary Refill: Brisk,< 3 seconds   Peripheral Pulses: +2

## 2025-04-17 ENCOUNTER — OFFICE VISIT (OUTPATIENT)
Dept: FAMILY MEDICINE CLINIC | Age: 35
End: 2025-04-17
Payer: COMMERCIAL

## 2025-04-17 ENCOUNTER — TELEPHONE (OUTPATIENT)
Dept: CARDIOLOGY CLINIC | Age: 35
End: 2025-04-17

## 2025-04-17 VITALS
OXYGEN SATURATION: 99 % | HEART RATE: 63 BPM | DIASTOLIC BLOOD PRESSURE: 68 MMHG | WEIGHT: 103 LBS | HEIGHT: 63 IN | BODY MASS INDEX: 18.25 KG/M2 | SYSTOLIC BLOOD PRESSURE: 110 MMHG | RESPIRATION RATE: 16 BRPM

## 2025-04-17 DIAGNOSIS — I95.9 TRANSIENT HYPOTENSION: ICD-10-CM

## 2025-04-17 DIAGNOSIS — R63.0 ANOREXIA: Primary | ICD-10-CM

## 2025-04-17 DIAGNOSIS — F32.A DEPRESSION, UNSPECIFIED DEPRESSION TYPE: ICD-10-CM

## 2025-04-17 DIAGNOSIS — G57.02 PIRIFORMIS SYNDROME OF LEFT SIDE: ICD-10-CM

## 2025-04-17 DIAGNOSIS — R55 SYNCOPE, UNSPECIFIED SYNCOPE TYPE: ICD-10-CM

## 2025-04-17 DIAGNOSIS — I95.89 CHRONIC HYPOTENSION: Primary | ICD-10-CM

## 2025-04-17 DIAGNOSIS — F12.90 CANNABIS USE DISORDER: ICD-10-CM

## 2025-04-17 PROCEDURE — G8419 CALC BMI OUT NRM PARAM NOF/U: HCPCS | Performed by: FAMILY MEDICINE

## 2025-04-17 PROCEDURE — 99204 OFFICE O/P NEW MOD 45 MIN: CPT | Performed by: FAMILY MEDICINE

## 2025-04-17 PROCEDURE — 36415 COLL VENOUS BLD VENIPUNCTURE: CPT | Performed by: FAMILY MEDICINE

## 2025-04-17 PROCEDURE — 1036F TOBACCO NON-USER: CPT | Performed by: FAMILY MEDICINE

## 2025-04-17 PROCEDURE — G8427 DOCREV CUR MEDS BY ELIG CLIN: HCPCS | Performed by: FAMILY MEDICINE

## 2025-04-17 PROCEDURE — 1111F DSCHRG MED/CURRENT MED MERGE: CPT | Performed by: FAMILY MEDICINE

## 2025-04-17 RX ORDER — OMEPRAZOLE 20 MG/1
20 CAPSULE, DELAYED RELEASE ORAL DAILY
COMMUNITY
Start: 2024-05-15

## 2025-04-17 RX ORDER — TRIAMCINOLONE ACETONIDE 1 MG/G
CREAM TOPICAL
Qty: 45 G | Refills: 1 | Status: SHIPPED | OUTPATIENT
Start: 2025-04-17

## 2025-04-17 RX ORDER — TRIAMCINOLONE ACETONIDE 1 MG/G
CREAM TOPICAL
COMMUNITY
Start: 2025-03-24 | End: 2025-04-17 | Stop reason: SDUPTHER

## 2025-04-17 RX ORDER — ONDANSETRON 4 MG/1
TABLET, ORALLY DISINTEGRATING ORAL
COMMUNITY
Start: 2025-02-10

## 2025-04-17 ASSESSMENT — PATIENT HEALTH QUESTIONNAIRE - PHQ9
SUM OF ALL RESPONSES TO PHQ QUESTIONS 1-9: 18
9. THOUGHTS THAT YOU WOULD BE BETTER OFF DEAD, OR OF HURTING YOURSELF: SEVERAL DAYS
8. MOVING OR SPEAKING SO SLOWLY THAT OTHER PEOPLE COULD HAVE NOTICED. OR THE OPPOSITE, BEING SO FIGETY OR RESTLESS THAT YOU HAVE BEEN MOVING AROUND A LOT MORE THAN USUAL: SEVERAL DAYS
10. IF YOU CHECKED OFF ANY PROBLEMS, HOW DIFFICULT HAVE THESE PROBLEMS MADE IT FOR YOU TO DO YOUR WORK, TAKE CARE OF THINGS AT HOME, OR GET ALONG WITH OTHER PEOPLE: VERY DIFFICULT
2. FEELING DOWN, DEPRESSED OR HOPELESS: MORE THAN HALF THE DAYS
SUM OF ALL RESPONSES TO PHQ QUESTIONS 1-9: 19
SUM OF ALL RESPONSES TO PHQ QUESTIONS 1-9: 19
3. TROUBLE FALLING OR STAYING ASLEEP: NEARLY EVERY DAY
1. LITTLE INTEREST OR PLEASURE IN DOING THINGS: NOT AT ALL
7. TROUBLE CONCENTRATING ON THINGS, SUCH AS READING THE NEWSPAPER OR WATCHING TELEVISION: NEARLY EVERY DAY
4. FEELING TIRED OR HAVING LITTLE ENERGY: NEARLY EVERY DAY
5. POOR APPETITE OR OVEREATING: NEARLY EVERY DAY
6. FEELING BAD ABOUT YOURSELF - OR THAT YOU ARE A FAILURE OR HAVE LET YOURSELF OR YOUR FAMILY DOWN: NEARLY EVERY DAY
SUM OF ALL RESPONSES TO PHQ QUESTIONS 1-9: 19

## 2025-04-17 ASSESSMENT — ENCOUNTER SYMPTOMS
SHORTNESS OF BREATH: 0
CONSTIPATION: 0
VOMITING: 0
DIARRHEA: 0
NAUSEA: 1

## 2025-04-17 ASSESSMENT — COLUMBIA-SUICIDE SEVERITY RATING SCALE - C-SSRS
1. WITHIN THE PAST MONTH, HAVE YOU WISHED YOU WERE DEAD OR WISHED YOU COULD GO TO SLEEP AND NOT WAKE UP?: NO
2. HAVE YOU ACTUALLY HAD ANY THOUGHTS OF KILLING YOURSELF?: NO
6. HAVE YOU EVER DONE ANYTHING, STARTED TO DO ANYTHING, OR PREPARED TO DO ANYTHING TO END YOUR LIFE?: YES
7. DID THIS OCCUR IN THE LAST THREE MONTHS: NO

## 2025-04-17 NOTE — TELEPHONE ENCOUNTER
Pts  called stating that his wife was just recently discharged from ED. Pts  states that wife did not receive further instructions from cardiology regarding any f/u or referrals that may be recommended. Pt currently does not have a PCP to report to as stated in hospital discharge paperword.    Pt is established with [facility], so potentially will need a hsfu where pt can have all concerns addressed at visit. Please advise a date and time if applicable.     Best number [phone].

## 2025-04-17 NOTE — TELEPHONE ENCOUNTER
Please advise if f/u is necessary, pt was recently admitted with hypotension/ concern for addisons disease.

## 2025-04-17 NOTE — ASSESSMENT & PLAN NOTE
I have advised her given her current medications I would not be willing to manage that at this office.  Referred to psychiatry.  Lab work is ordered.    Orders:    RON - Garland Kc MD, Psychiatry, [facility]    CBC with Auto Differential    Comprehensive Metabolic Panel    Lipid Panel    TSH reflex to FT4    Vitamin D 25 Hydroxy    Vitamin B12

## 2025-04-17 NOTE — PROGRESS NOTES
Josue Almeida (:  1990) is a 34 y.o. female,New patient, here for evaluation of the following chief complaint(s):  New Patient           Assessment & Plan  Anorexia    I have advised her given her current medications I would not be willing to manage that at this office.  Referred to psychiatry.  Lab work is ordered.    Orders:    Garland Alvarez MD, Psychiatry, [facility]    CBC with Auto Differential    Comprehensive Metabolic Panel    Lipid Panel    TSH reflex to FT4    Vitamin D 25 Hydroxy    Vitamin B12    Syncope, unspecified syncope type    Continue midodrine follow-up with cardiology.  We did discuss potentially SSRIs can be helpful for neurocardiogenic syncope          Depression, unspecified depression type    Psychiatry referral as above    Orders:    Garland Alvarez MD, Psychiatry, [facility]    Cannabis use disorder    This may be contributing to the mood disorder and anorexia    Orders:    Garland Alvarez MD, Psychiatry, [facility]    Piriformis syndrome of left side    As the benefit following the surgery indicates the Kenalog cream is helpful for residual neuropathy.    Orders:    triamcinolone (KENALOG) 0.1 % cream; Apply topically 2 times daily.      Return in about 3 weeks (around 2025).       Subjective   HPI  34-year-old female here to establish.  Indicates she is recently quit seeing her psychiatrist.  Transferring from her previous PCPs office.  Cannot give a definitive reason on why.  Currently on a mental health cocktail including clonazepam, ketamine, gabapentin.  She has a history of syncopal episodes.  Is following with cardiology.  Recently had surgical release of piriformis.  Review of Systems   Respiratory:  Negative for shortness of breath.    Cardiovascular:  Negative for chest pain.   Gastrointestinal:  Positive for nausea. Negative for constipation, diarrhea and vomiting.   Genitourinary: Negative.    Skin:  Negative for rash.

## 2025-04-17 NOTE — ASSESSMENT & PLAN NOTE
This may be contributing to the mood disorder and anorexia    Orders:    Garland Alvarez MD, Psychiatry, [facility]

## 2025-04-17 NOTE — ASSESSMENT & PLAN NOTE
Psychiatry referral as above    Orders:    RON - Garland Kc MD, Psychiatry, [facility]

## 2025-04-17 NOTE — PROGRESS NOTES
LATE ENTRY  Received call from patient stating she is not sure who she is supposed to follow up with after DC from hospital yesterday.   Spoke with Dr. Nick, patient is to follow up with PCP.    Patient aware.     Electronically signed by Michelle Kelsey RN on 4/17/2025 at 8:38 AM

## 2025-04-17 NOTE — ASSESSMENT & PLAN NOTE
As the benefit following the surgery indicates the Kenalog cream is helpful for residual neuropathy.    Orders:    triamcinolone (KENALOG) 0.1 % cream; Apply topically 2 times daily.

## 2025-04-17 NOTE — ASSESSMENT & PLAN NOTE
Continue midodrine follow-up with cardiology.  We did discuss potentially SSRIs can be helpful for neurocardiogenic syncope

## 2025-04-18 ENCOUNTER — TELEPHONE (OUTPATIENT)
Dept: FAMILY MEDICINE CLINIC | Age: 35
End: 2025-04-18

## 2025-04-18 ENCOUNTER — TELEPHONE (OUTPATIENT)
Dept: CARDIOLOGY CLINIC | Age: 35
End: 2025-04-18

## 2025-04-18 LAB
25(OH)D3 SERPL-MCNC: 31.7 NG/ML
ACTH PLAS-MCNC: 13 PG/ML (ref 7–63)
ALBUMIN SERPL-MCNC: 4.7 G/DL (ref 3.4–5)
ALBUMIN/GLOB SERPL: 2 {RATIO} (ref 1.1–2.2)
ALP SERPL-CCNC: 39 U/L (ref 40–129)
ALT SERPL-CCNC: 16 U/L (ref 10–40)
ANION GAP SERPL CALCULATED.3IONS-SCNC: 10 MMOL/L (ref 3–16)
AST SERPL-CCNC: 21 U/L (ref 15–37)
BASOPHILS # BLD: 0 K/UL (ref 0–0.2)
BASOPHILS NFR BLD: 0.5 %
BILIRUB SERPL-MCNC: 0.3 MG/DL (ref 0–1)
BUN SERPL-MCNC: 9 MG/DL (ref 7–20)
CALCIUM SERPL-MCNC: 9.3 MG/DL (ref 8.3–10.6)
CHLORIDE SERPL-SCNC: 105 MMOL/L (ref 99–110)
CHOLEST SERPL-MCNC: 206 MG/DL (ref 0–199)
CO2 SERPL-SCNC: 24 MMOL/L (ref 21–32)
CREAT SERPL-MCNC: 0.6 MG/DL (ref 0.6–1.1)
DEPRECATED RDW RBC AUTO: 13.5 % (ref 12.4–15.4)
EOSINOPHIL # BLD: 0 K/UL (ref 0–0.6)
EOSINOPHIL NFR BLD: 0.7 %
GFR SERPLBLD CREATININE-BSD FMLA CKD-EPI: >90 ML/MIN/{1.73_M2}
GLUCOSE SERPL-MCNC: 84 MG/DL (ref 70–99)
HCT VFR BLD AUTO: 33.6 % (ref 36–48)
HDLC SERPL-MCNC: 48 MG/DL (ref 40–60)
HGB BLD-MCNC: 11.2 G/DL (ref 12–16)
LDLC SERPL CALC-MCNC: 145 MG/DL
LYMPHOCYTES # BLD: 1.3 K/UL (ref 1–5.1)
LYMPHOCYTES NFR BLD: 42.1 %
MCH RBC QN AUTO: 28.4 PG (ref 26–34)
MCHC RBC AUTO-ENTMCNC: 33.3 G/DL (ref 31–36)
MCV RBC AUTO: 85.2 FL (ref 80–100)
MONOCYTES # BLD: 0.2 K/UL (ref 0–1.3)
MONOCYTES NFR BLD: 7 %
NEUTROPHILS # BLD: 1.6 K/UL (ref 1.7–7.7)
NEUTROPHILS NFR BLD: 49.7 %
PLATELET # BLD AUTO: 214 K/UL (ref 135–450)
PMV BLD AUTO: 9.2 FL (ref 5–10.5)
POTASSIUM SERPL-SCNC: 4.4 MMOL/L (ref 3.5–5.1)
PROT SERPL-MCNC: 7.1 G/DL (ref 6.4–8.2)
RBC # BLD AUTO: 3.95 M/UL (ref 4–5.2)
SODIUM SERPL-SCNC: 139 MMOL/L (ref 136–145)
TRIGL SERPL-MCNC: 63 MG/DL (ref 0–150)
TSH SERPL DL<=0.005 MIU/L-ACNC: 1.11 UIU/ML (ref 0.27–4.2)
VIT B12 SERPL-MCNC: 834 PG/ML (ref 211–911)
VLDLC SERPL CALC-MCNC: 13 MG/DL
WBC # BLD AUTO: 3.2 K/UL (ref 4–11)

## 2025-04-18 NOTE — TELEPHONE ENCOUNTER
Spoke to patient and patient's . They expressed concern with Dr. Joe's recommendations at the time of her OV 4/17/2025. Does not want to see him for any future visits. Would like to stay within [facility]. I recommended possibly making an appointment with Dr. Falcon, since she is accepting New patients at this time. Patient and  were going to call and make an appointment.

## 2025-04-18 NOTE — TELEPHONE ENCOUNTER
Reviewed course.  Seems ACTH 10 test is due 4/22.  We have referred endocrinology would prefer to expedite evaluation as able.  I would also like to increase her midodrine to 5 mg 3 times daily.  Take no less than 4 hours apart.  Suggest 8 AM, 12 PM, 4 PM.  Monitor supine blood pressures at home and ensure not severely elevated.  If she has elevated blood pressures above 160 while laying down at home she should call the office to discuss this dose   JEREMY Colunga RN to JEREMY Colunga RN to    /    ELVIA CAMPBELL OR RN

## 2025-04-21 ENCOUNTER — TELEPHONE (OUTPATIENT)
Age: 35
End: 2025-04-21

## 2025-04-21 ENCOUNTER — RESULTS FOLLOW-UP (OUTPATIENT)
Dept: FAMILY MEDICINE CLINIC | Age: 35
End: 2025-04-21

## 2025-04-21 ENCOUNTER — OFFICE VISIT (OUTPATIENT)
Age: 35
End: 2025-04-21
Payer: COMMERCIAL

## 2025-04-21 VITALS
WEIGHT: 101.6 LBS | DIASTOLIC BLOOD PRESSURE: 64 MMHG | SYSTOLIC BLOOD PRESSURE: 92 MMHG | TEMPERATURE: 98 F | BODY MASS INDEX: 18 KG/M2 | RESPIRATION RATE: 16 BRPM | HEART RATE: 63 BPM | OXYGEN SATURATION: 98 % | HEIGHT: 63 IN

## 2025-04-21 DIAGNOSIS — I95.9 HYPOTENSION, UNSPECIFIED HYPOTENSION TYPE: Primary | ICD-10-CM

## 2025-04-21 PROCEDURE — 1111F DSCHRG MED/CURRENT MED MERGE: CPT | Performed by: HOSPITALIST

## 2025-04-21 PROCEDURE — 1036F TOBACCO NON-USER: CPT | Performed by: HOSPITALIST

## 2025-04-21 PROCEDURE — G8427 DOCREV CUR MEDS BY ELIG CLIN: HCPCS | Performed by: HOSPITALIST

## 2025-04-21 PROCEDURE — 99205 OFFICE O/P NEW HI 60 MIN: CPT | Performed by: HOSPITALIST

## 2025-04-21 PROCEDURE — G8419 CALC BMI OUT NRM PARAM NOF/U: HCPCS | Performed by: HOSPITALIST

## 2025-04-21 RX ORDER — MIDODRINE HYDROCHLORIDE 5 MG/1
5 TABLET ORAL 3 TIMES DAILY
Qty: 270 TABLET | Refills: 0 | Status: SHIPPED | OUTPATIENT
Start: 2025-04-21

## 2025-04-21 NOTE — TELEPHONE ENCOUNTER
Pt and pt  made aware once established they can not transfer dr or locations as pt wanted first available. Both pt and  verbalized understanding

## 2025-04-21 NOTE — TELEPHONE ENCOUNTER
Called and spoke with pt, she is willing to increase midodrine, it has been pended for sign off. Called and spoke with endocrinology, and they said they could get pt in as early as today. Called pt back and she will call endo to get in ASAP.

## 2025-04-21 NOTE — PATIENT INSTRUCTIONS
-Patient is getting repeat ACTH stim test tomorrow at [facility]  Will also obtain DHEA-S and 21-hydroxylase antibody levels at the same time at 8 AM.     - Follow-up in 2 weeks with the results

## 2025-04-21 NOTE — PROGRESS NOTES
04/21/25 46.1 kg (101 lb 9.6 oz)   04/17/25 46.7 kg (103 lb)   04/14/25 47.6 kg (105 lb)       Physical exam  GENERAL: Patient awake, alert and answering questions appropriately  NEURO: Grossly normal.  HEENT: No pallor or icterus.  Oral  mucosa appears to be normal.  No nasal discharge.  NECK: Supple.    CHEST: Bilateral breath sounds present equally, no wheezing present  HEART: S1-S2 present.  Regular rate and rhythm  ABDOMEN: Soft, nontender and nondistended.    EXTREMITIES: No lower extremity edema  SKIN: No skin tanning noted    Lab Review  Lab Results   Component Value Date/Time     04/17/2025 02:38 PM    K 4.4 04/17/2025 02:38 PM    K 4.6 04/14/2025 07:01 PM     04/17/2025 02:38 PM    CO2 24 04/17/2025 02:38 PM    BUN 9 04/17/2025 02:38 PM    CREATININE 0.6 04/17/2025 02:38 PM    GLUCOSE 84 04/17/2025 02:38 PM    GLUCOSE 95 12/05/2023 12:42 PM    CALCIUM 9.3 04/17/2025 02:38 PM    LABGLOM >90 04/17/2025 02:38 PM    LABGLOM >60 12/25/2023 02:24 PM      No results found for this or any previous visit.         Assessment:     Assessment & Plan  Hypotension, unspecified hypotension type       Orders:    DHEA-Sulfate; Future    MISCELLANEOUS SENDOUT 21 Hydroxylase Antibody; Future      Patient is here for further evaluation of hypotension.  Based on prior blood work it appears it is less likely to be adrenal insufficiency as her cosyntropin stimulation test was normal.  Also her adequate aldosterone levels make primary adrenal insufficiency to be less likely.     To rule out central etiology I will check repeat fasting 8 AM cortisol, ACTH and DHEA-S levels.  If these are noted to be normal it is unlikely that her symptoms of hypotension are due to adrenal issues.  In that case I would encourage to follow-up with her PCP/cardiology for further management.  We will also obtain 21-hydroxylase antibodies to rule out autoimmune issues.  Also the fact that her electrolytes have been normal with no noted

## 2025-04-22 ENCOUNTER — HOSPITAL ENCOUNTER (OUTPATIENT)
Age: 35
Discharge: HOME | End: 2025-04-22
Payer: COMMERCIAL

## 2025-04-22 ENCOUNTER — TELEPHONE (OUTPATIENT)
Dept: CARDIOLOGY CLINIC | Age: 35
End: 2025-04-22

## 2025-04-22 ENCOUNTER — HOSPITAL ENCOUNTER (OUTPATIENT)
Dept: NURSING | Age: 35
Setting detail: INFUSION SERIES
Discharge: HOME | End: 2025-04-22
Payer: COMMERCIAL

## 2025-04-22 VITALS
BODY MASS INDEX: 18.01 KG/M2 | DIASTOLIC BLOOD PRESSURE: 54 MMHG | TEMPERATURE: 98 F | HEART RATE: 65 BPM | WEIGHT: 101.63 LBS | SYSTOLIC BLOOD PRESSURE: 90 MMHG | HEIGHT: 63 IN | RESPIRATION RATE: 16 BRPM

## 2025-04-22 DIAGNOSIS — I95.9 HYPOTENSION, UNSPECIFIED HYPOTENSION TYPE: ICD-10-CM

## 2025-04-22 DIAGNOSIS — R53.82 CHRONIC FATIGUE: ICD-10-CM

## 2025-04-22 DIAGNOSIS — R53.1 WEAKNESS: ICD-10-CM

## 2025-04-22 DIAGNOSIS — I95.0 IDIOPATHIC HYPOTENSION: ICD-10-CM

## 2025-04-22 DIAGNOSIS — I95.89 CHRONIC HYPOTENSION: Primary | ICD-10-CM

## 2025-04-22 LAB
CORTIS 1H P 250 UG ACTH RIV SERPL-MCNC: 20.9 UG/DL
CORTIS 30M P 250 UG ACTH RIV SERPL-MCNC: 16.7 UG/DL
CORTIS SERPL-MCNC: 10.5 UG/DL

## 2025-04-22 PROCEDURE — 96374 THER/PROPH/DIAG INJ IV PUSH: CPT

## 2025-04-22 PROCEDURE — 82627 DEHYDROEPIANDROSTERONE: CPT

## 2025-04-22 PROCEDURE — 36415 COLL VENOUS BLD VENIPUNCTURE: CPT

## 2025-04-22 PROCEDURE — 6360000002 HC RX W HCPCS: Performed by: INTERNAL MEDICINE

## 2025-04-22 PROCEDURE — 80400 ACTH STIMULATION PANEL: CPT

## 2025-04-22 PROCEDURE — 83516 IMMUNOASSAY NONANTIBODY: CPT

## 2025-04-22 PROCEDURE — 99203 OFFICE O/P NEW LOW 30 MIN: CPT

## 2025-04-22 RX ORDER — COSYNTROPIN 0.25 MG/ML
250 INJECTION, POWDER, FOR SOLUTION INTRAMUSCULAR; INTRAVENOUS ONCE
Status: CANCELLED | OUTPATIENT
Start: 2025-04-22 | End: 2025-04-22

## 2025-04-22 RX ORDER — COSYNTROPIN 0.25 MG/ML
250 INJECTION, POWDER, FOR SOLUTION INTRAMUSCULAR; INTRAVENOUS ONCE
Status: COMPLETED | OUTPATIENT
Start: 2025-04-22 | End: 2025-04-22

## 2025-04-22 RX ADMIN — COSYNTROPIN 250 MCG: 0.25 INJECTION, POWDER, LYOPHILIZED, FOR SOLUTION INTRAMUSCULAR; INTRAVENOUS at 08:15

## 2025-04-22 ASSESSMENT — PAIN SCALES - GENERAL: PAINLEVEL_OUTOF10: 0

## 2025-04-22 NOTE — TELEPHONE ENCOUNTER
Spoke with spouse.   Dr. Kacey Mehta [phone].   He will call to schedule her.  States endocrinologist stated that the CARINE and bloodwork is pointing more toward Rheumatology than endocrinology needs.

## 2025-04-22 NOTE — DISCHARGE INSTRUCTIONS
Adrenocorticotropic Hormone Stimulation   (ACTH Cosyntropin Stimulation Test)    Pronounced: [id number]-[id number]-tro-pic hor-moan stim-U-lay-shun test      What Is Adrenocortinocotropic Hormone (ACTH)?   ACTH is a hormone made by the pituitary gland. It regulates the production of cortisol made by the adrenal glands. Cortisol is an important hormone that regulates blood pressure and other important bodily functions.   Reason for the Test   The ACTH stimulation test assesses whether your adrenal glands are capable of making enough cortisol in response to ACTH (Cosyntropin is synthetic ACTH). Cortisol is an important part of the body's response to stress. Testing the body's response to ACTH can help determine if you suffer from:   [place]'s disease ?auto-immune destruction of the adrenal glands   Pituitary gland problems   Type of Sample Taken   Blood from a vein in your arm or wrist    Urine samples for the longer ACTH test    Prior to Collecting the Sample   You may be asked to limit your physical activities and eat a high-carbohydrate diet during the 12-24 hours before the test and then eat and drink nothing for six hours prior to the test.   Discuss any medications or treatments you are taking with your doctor to find out if they will interfere with the test. For example, hydrocortisone and other glucocorticoids must be stopped several days before the test.   During the Sample Collection   Often, an AM cortisol level is drawn to determine if baseline levels are normal. If this test is abnormal, two other tests of ACTH?rapid and slow may be performed.   The rapid test looks at blood cortisol levels drawn before an injection of a synthetic ACTH, called cosyntropin, and then again at 30 and 60 minutes afterwards to determine your body's response.   If the results of the rapid test aren't normal, your physician may want to test you over a five-day period to get a better understanding of your response. You will  receive a daily injection of ACTH for five days. Your blood will be tested for serum cortisol before and after you receive the injection.   If the doctor thinks you may have [place]'s disease, you will be given 1 mg of the synthetic steroid hormone dexamethasone during the screening period. In addition to the blood draw, your urine will be tested every day.   If your blood sample is collected from a vein in your arm, you will most likely have it drawn by a phlebotomist (health professional trained to take blood) in a medical laboratory. Most often you will be asked to sit in a reclining chair with your feet up. Your phlebotomist will locate the most prominent vein at the crease of one of your elbows, place a tourniquet temporarily around your upper arm, and insert a hollow, sterile needle into the vein to draw the blood into tubes. Once all the blood is collected (usually a very small amount) your phlebotomist will release the tourniquet, remove the needle, place a clean piece of gauze over the puncture site, and ask you to bend your elbow or hold pressure over the gauze to prevent bleeding. The entire process takes 5-10 minutes.   After Collecting the Sample   If a venous blood draw is done, you will be asked to stay seated for 10 or 15 minutes until you are comfortable getting up and leaving. Some patients experience a certain amount of lightheadedness during and immediately following a blood draw. The only thing to do is to remain in a reclining position and wait for the feeling to pass.   Results   It will take about a day to receive your test results for the rapid test and more than a week to get the final results for the five-day test.   Cortisol is created by the adrenal glands in response to ACTH, which can vary according to levels of activity, illness, and stress . It is normal for cortisol levels in the blood and urine to increase after the injection. After the injection, blood cortisol levels can increase

## 2025-04-22 NOTE — PROGRESS NOTES
Pt here for an ACTH stimulation test  Pt reports she has read a little bit about this test as they have been trying to find out why she is so fatigued and feels so bad all the time  Pt without c/o's of discomfort at this time  ACTH test explained to pt and she was receptive to the information and denies any further questions  IV # 22 was started in her LAC on my first attempt then base line labs were drawn Cosyntropin 250 mcg was given over 2 mins and INT was flushed with 10 cc NS using start stop method  Blood work was drawn at 30 min and 60 mins as well per orders after 15 cc blood was wasted each time INT was removed  cath tip intact  Pressure then pressure dressing was applied and secured with a coban dressing  Pt shaunna well   Discharge instructions were reviewed with pt and copy was given  Understanding was verbalized  Pt was discharged ambulatory in stable condition and will follow up with Dr. Prince for test results

## 2025-04-22 NOTE — TELEPHONE ENCOUNTER
Pt's spouse sts the endocrinologist wants pt to see a Rheumatologist. RJWILFREDO said he would sent referral if needed. Please advise.

## 2025-04-23 ENCOUNTER — RESULTS FOLLOW-UP (OUTPATIENT)
Dept: CARDIOLOGY | Age: 35
End: 2025-04-23

## 2025-04-25 LAB
DHEA-S SERPL-MCNC: 190 UG/DL (ref 98.8–340)
MISCELLANEOUS LAB TEST ORDER: NORMAL

## 2025-04-28 ENCOUNTER — OFFICE VISIT (OUTPATIENT)
Age: 35
End: 2025-04-28
Payer: COMMERCIAL

## 2025-04-28 VITALS
HEIGHT: 63 IN | DIASTOLIC BLOOD PRESSURE: 81 MMHG | WEIGHT: 111.4 LBS | TEMPERATURE: 98 F | BODY MASS INDEX: 19.74 KG/M2 | SYSTOLIC BLOOD PRESSURE: 111 MMHG | HEART RATE: 59 BPM | RESPIRATION RATE: 14 BRPM

## 2025-04-28 DIAGNOSIS — I95.9 HYPOTENSION, UNSPECIFIED HYPOTENSION TYPE: Primary | ICD-10-CM

## 2025-04-28 PROBLEM — R00.1 SYMPTOMATIC BRADYCARDIA: Status: ACTIVE | Noted: 2025-04-28

## 2025-04-28 PROCEDURE — 99214 OFFICE O/P EST MOD 30 MIN: CPT | Performed by: HOSPITALIST

## 2025-04-28 PROCEDURE — G8427 DOCREV CUR MEDS BY ELIG CLIN: HCPCS | Performed by: HOSPITALIST

## 2025-04-28 PROCEDURE — 1111F DSCHRG MED/CURRENT MED MERGE: CPT | Performed by: HOSPITALIST

## 2025-04-28 PROCEDURE — 1036F TOBACCO NON-USER: CPT | Performed by: HOSPITALIST

## 2025-04-28 PROCEDURE — G8420 CALC BMI NORM PARAMETERS: HCPCS | Performed by: HOSPITALIST

## 2025-04-28 NOTE — PROGRESS NOTES
Subjective:      Josue Almeida, 34 y.o. female, who is here for follow up of adrenal insufficiency.  Patient preferred name is Josue. She is here with her  Sanjiv and daughter Sumaya.    It appears patient was having episodes of severe hypotension for the past few months since July/2024. This started all of a sudden. She has had episodes of dizziness. No LOC. She also feels tired when her BP drops. Lowest BP has been 61/35. Usually happens at bedtime.  She had been seeing cardiologist Dr. Prince and there was concern for Siva's disease.  Hence she presented to the ER on 4/14/2025 and at the time of triage her blood pressure was noted to be 114/88.  Endocrinology was consulted and she was started on IV fluid hydration with LR.  ACTH stimulation test was performed which showed appropriate stimulation as noted below.  Her TSH also was noted to be normal.  Of note during her admission she was not noted to have normal sodium/potassium levels.  Also there was no evidence of hypoglycemia.  About a month ago her a.m. cortisol was noted to be normal at 13.6 mcg/dL.  Also her aldosterone level was adequate.    CT abdomen pelvis with contrast in July 2020 showed solid organs of the abdomen to be stable in appearance.  Given her adrenal workup was within normal limits she was started on midodrine 5 mg 3 times daily.   Latest Reference Range & Units 04/16/25 07:58   Cortisol, Base ug/dL 9.1   Cortisol, 30 Min ug/dL 18.5   Cortisol, 60 Min ug/dL 23.9      Latest Reference Range & Units 03/24/25 10:10   Cortisol - AM 4.3 - 22.4 ug/dL 13.6      Latest Reference Range & Units 04/15/25 07:53   ACTH 7 - 63 pg/mL 13      Latest Reference Range & Units 04/17/25 14:38   TSH Reflex FT4 0.27 - 4.20 uIU/mL 1.11      Latest Reference Range & Units 03/24/25 11:02   Renin Activity ng/mL/hr 2.6      Latest Reference Range & Units 03/24/25 11:02   Aldosterone ng/dL 47.2       Patient denies noticing any purple abdominal

## 2025-04-28 NOTE — PATIENT INSTRUCTIONS
- Low concern for adrenal insufficiency at this point      - Follow-up with PCP/cardiology for further management      - Follow-up with us as needed or if new symptoms/concerns arise

## 2025-05-13 ENCOUNTER — OFFICE VISIT (OUTPATIENT)
Dept: ORTHOPEDIC SURGERY | Age: 35
End: 2025-05-13
Payer: COMMERCIAL

## 2025-05-13 VITALS — HEIGHT: 63 IN | BODY MASS INDEX: 18.43 KG/M2 | WEIGHT: 104 LBS

## 2025-05-13 DIAGNOSIS — M25.311 DYSKINESIS OF RIGHT SCAPULA: ICD-10-CM

## 2025-05-13 DIAGNOSIS — M25.512 LEFT SHOULDER PAIN, UNSPECIFIED CHRONICITY: Primary | ICD-10-CM

## 2025-05-13 PROCEDURE — 99203 OFFICE O/P NEW LOW 30 MIN: CPT | Performed by: ORTHOPAEDIC SURGERY

## 2025-05-13 PROCEDURE — 1036F TOBACCO NON-USER: CPT | Performed by: ORTHOPAEDIC SURGERY

## 2025-05-13 PROCEDURE — G8427 DOCREV CUR MEDS BY ELIG CLIN: HCPCS | Performed by: ORTHOPAEDIC SURGERY

## 2025-05-13 PROCEDURE — 1111F DSCHRG MED/CURRENT MED MERGE: CPT | Performed by: ORTHOPAEDIC SURGERY

## 2025-05-13 PROCEDURE — G8419 CALC BMI OUT NRM PARAM NOF/U: HCPCS | Performed by: ORTHOPAEDIC SURGERY

## 2025-05-13 RX ORDER — MELOXICAM 15 MG/1
15 TABLET ORAL DAILY PRN
Qty: 30 TABLET | Refills: 0 | Status: SHIPPED | OUTPATIENT
Start: 2025-05-13

## 2025-05-13 NOTE — PROGRESS NOTES
SHOULDER VISIT      HISTORY OF PRESENT ILLNESS    Josue Almeida is a 34 y.o. female who presents for evaluation of right shoulder injury tree which occurred when she fell into the tub 3 days ago or so.  At this time she has a hard time reaching behind her back and rates her pain 6/10 with any motions.  She has popping and burning in the shoulder.    ROS    Well-documented patient history form dated 2025  All other ROS negative except for above.    Past Surgical history    Past Surgical History:   Procedure Laterality Date     SECTION  2014    X 2;  & ,      CYSTOSCOPY  [id number]    [facility]    EYE SURGERY      EYE SURGERY      OTHER SURGICAL HISTORY      left piriformis       PAST MEDICAL    Past Medical History:   Diagnosis Date    Anemia     Anxiety     Chronic fatigue 2025    Chronic hypotension 2025    Depression     no meds    Headache     History of recurrent UTI (urinary tract infection)     Infections of kidney     Kidney stone 2011    Miscarriage     Other disorders of kidney and ureter     2 ureters on R side    Status post cystoscopy 2011    Weakness 2025       Allergies    Allergies   Allergen Reactions    Hydromorphone Hcl Other (See Comments)     Other Reaction(s): Not available    Hydromorphone Hcl Anaphylaxis and Shortness Of Breath    Metoclopramide Palpitations     Other Reaction(s): hallucinations, Hallucinations, Rapid Heart Beat, Not available    Sertraline Anxiety, Other (See Comments) and Shortness Of Breath     More frequent panic attacks. RWK    Buspirone     Citalopram Hydrobromide     Olanzapine        Meds    Current Outpatient Medications   Medication Sig Dispense Refill    meloxicam (MOBIC) 15 MG tablet Take 1 tablet by mouth daily as needed for Pain 30 tablet 0    tiZANidine (ZANAFLEX) 4 MG tablet Take 1 tablet by mouth 3 times daily as needed (3 times a day) 30 tablet 0    vilazodone hcl 10 MG TABS Take 1 tablet

## 2025-05-20 ENCOUNTER — TELEPHONE (OUTPATIENT)
Dept: ORTHOPEDIC SURGERY | Age: 35
End: 2025-05-20

## 2025-05-20 NOTE — TELEPHONE ENCOUNTER
R/C TO PATIENT    ADVISED PATIENT TO TAKE TIZANIDINE ONLY AT NIGHT, IF POSSIBLE. IF IT IS NEEDED DURING THE DAY, TRY TAKING 1/2 OR 1/4 TABLET.    PATIENT VERBALIZED UNDERSTANDING

## 2025-05-20 NOTE — TELEPHONE ENCOUNTER
Patients  calling in states patient is taking tizanidine one time a day and it is completely knocking her out, taking meloxicam at night and nothing seems to be helping with the pain     Please advise    [phone]

## 2025-06-12 ENCOUNTER — TELEPHONE (OUTPATIENT)
Dept: CARDIOLOGY CLINIC | Age: 35
End: 2025-06-12

## 2025-06-12 RX ORDER — MIDODRINE HYDROCHLORIDE 10 MG/1
10 TABLET ORAL 3 TIMES DAILY
Qty: 90 TABLET | Refills: 2 | Status: SHIPPED | OUTPATIENT
Start: 2025-06-12

## 2025-06-12 NOTE — TELEPHONE ENCOUNTER
Called and spoke with pt, relayed [facility] message, she v/u. Ov scheduled for 09/08 with gabriela. Medication pended for sign off

## 2025-06-12 NOTE — TELEPHONE ENCOUNTER
Blood Pressure Problems:    Hypotension (Low BP)    1.What are your BP readings within the last week?   6.12.25 76/54- current reading   The rest of the readings do not have a specific date but have been in the evenings this past week.  88/55, 81/56, 82/56, 80/56, 82/42, 79/43  2.What blood pressure medications are you taking:   midodrine     Dose 5 mg    Frequency TID    3. What symptoms are you experiencing?  Do you have dizziness when standing?  Yes everyday  Headache?  no  Blurred vision?  Yes- but states she needs to see eye dr appt has been scheduled  SOB?  no  CP?   no  4. Are you switching positions slowly?  unknown  5.  Are you spacing out your medications?   Yes pt takes midodrine at 8am 12 pm and 4pm     Pt  called concerned for pt.  Pt bp has been dropping in the evening causing extreme lethargy.  Pt  has called the ambulance in the past week and the last time the ambulance came out the gave her a shot of adrenaline that made the pt heart beat faster. Pt does not want to call the ambulance again due to feeling bad after the adrenaline. Pt has been falling asleep on the couch before 7pm with her children sitting on the couch with her.  Pt is hard to wake up when this happens.  Pt wanted RJM to know that she has been staying hydrated and drinking 1 to 1.5 electrolyte drinks per day.        Pt can be reached at [phone] or  at [phone]

## 2025-06-16 ENCOUNTER — OFFICE VISIT (OUTPATIENT)
Age: 35
End: 2025-06-16
Payer: COMMERCIAL

## 2025-06-16 VITALS
WEIGHT: 94.2 LBS | HEART RATE: 64 BPM | HEIGHT: 63 IN | DIASTOLIC BLOOD PRESSURE: 62 MMHG | SYSTOLIC BLOOD PRESSURE: 100 MMHG | BODY MASS INDEX: 16.69 KG/M2

## 2025-06-16 DIAGNOSIS — R79.89 ELEVATED PARATHYROID HORMONE: Primary | ICD-10-CM

## 2025-06-16 DIAGNOSIS — R79.89 ELEVATED PARATHYROID HORMONE: ICD-10-CM

## 2025-06-16 LAB
25(OH)D3 SERPL-MCNC: 33 NG/ML
ALBUMIN SERPL-MCNC: 4.8 G/DL (ref 3.4–5)
ANION GAP SERPL CALCULATED.3IONS-SCNC: 10 MMOL/L (ref 3–16)
BUN SERPL-MCNC: 23 MG/DL (ref 7–20)
CALCIUM SERPL-MCNC: 9.6 MG/DL (ref 8.3–10.6)
CHLORIDE SERPL-SCNC: 103 MMOL/L (ref 99–110)
CO2 SERPL-SCNC: 27 MMOL/L (ref 21–32)
CREAT SERPL-MCNC: 1.1 MG/DL (ref 0.6–1.1)
GFR SERPLBLD CREATININE-BSD FMLA CKD-EPI: 68 ML/MIN/{1.73_M2}
GLUCOSE SERPL-MCNC: 101 MG/DL (ref 70–99)
MAGNESIUM SERPL-MCNC: 2.43 MG/DL (ref 1.8–2.4)
PHOSPHATE SERPL-MCNC: 4.3 MG/DL (ref 2.5–4.9)
POTASSIUM SERPL-SCNC: 4.5 MMOL/L (ref 3.5–5.1)
PTH-INTACT SERPL-MCNC: 68.9 PG/ML (ref 14–72)
SODIUM SERPL-SCNC: 140 MMOL/L (ref 136–145)
TSH SERPL DL<=0.005 MIU/L-ACNC: 1.32 UIU/ML (ref 0.27–4.2)

## 2025-06-16 PROCEDURE — G8419 CALC BMI OUT NRM PARAM NOF/U: HCPCS | Performed by: HOSPITALIST

## 2025-06-16 PROCEDURE — G8427 DOCREV CUR MEDS BY ELIG CLIN: HCPCS | Performed by: HOSPITALIST

## 2025-06-16 PROCEDURE — 99215 OFFICE O/P EST HI 40 MIN: CPT | Performed by: HOSPITALIST

## 2025-06-16 PROCEDURE — 1036F TOBACCO NON-USER: CPT | Performed by: HOSPITALIST

## 2025-06-16 NOTE — PATIENT INSTRUCTIONS
-Adequate hydration    -We will obtain renal function panel, magnesium, PTH, 25-hydroxy vitamin D, TSH and ionized calcium.    -Continue to take adequate calcium 1200 mg daily and vitamin D 1000 units daily as advised    -In general please avoid falls, use night lights at night.     -Follow up in 1 week with results

## 2025-06-16 NOTE — ASSESSMENT & PLAN NOTE
Orders:    Calcium Ionized Serum; Future    PTH, Intact; Future    Magnesium; Future    Vitamin D 25 Hydroxy; Future    Renal Function Panel; Future    TSH reflex to FT4; Future

## 2025-06-18 LAB
CA-I ADJ PH7.4 SERPL-SCNC: 1.24 MMOL/L (ref 1.09–1.3)
CA-I SERPL ISE-SCNC: 1.26 MMOL/L (ref 1.09–1.3)

## 2025-06-24 ENCOUNTER — TELEMEDICINE (OUTPATIENT)
Age: 35
End: 2025-06-24
Payer: COMMERCIAL

## 2025-06-24 DIAGNOSIS — R79.89 ELEVATED PARATHYROID HORMONE: Primary | ICD-10-CM

## 2025-06-24 PROCEDURE — G8427 DOCREV CUR MEDS BY ELIG CLIN: HCPCS | Performed by: HOSPITALIST

## 2025-06-24 PROCEDURE — G8419 CALC BMI OUT NRM PARAM NOF/U: HCPCS | Performed by: HOSPITALIST

## 2025-06-24 PROCEDURE — 1036F TOBACCO NON-USER: CPT | Performed by: HOSPITALIST

## 2025-06-24 PROCEDURE — 99213 OFFICE O/P EST LOW 20 MIN: CPT | Performed by: HOSPITALIST

## 2025-06-24 RX ORDER — HYDROXYCHLOROQUINE SULFATE 200 MG/1
200 TABLET, FILM COATED ORAL DAILY
COMMUNITY
Start: 2025-06-04 | End: 2025-09-02

## 2025-06-24 NOTE — PROGRESS NOTES
[place] [address] [facility] [id number] - P [phone] - F [phone]  [address] [facility] [address]  [facility] [address]  Phone: [phone] Fax: [phone]       No follow-ups on file.       Josue Almeida, was evaluated through a synchronous (real-time) audio-video encounter. The patient (or guardian if applicable) is aware that this is a billable service, which includes applicable co-pays. This Virtual Visit was conducted with patient's (and/or legal guardian's) consent. Patient identification was verified, and a caregiver was present when appropriate.   The patient was located at Home: [address] [place] [id number]  Provider was located at Facility (Appt Dept): [address] [place] [id number]  Confirm you are appropriately licensed, registered, or certified to deliver care in the [facility] where the patient is located as indicated above. If you are not or unsure, please re-schedule the visit: Yes, I confirm.        Total time spent for this encounter: Not billed by time    --Rajesh Marquis MD on 6/24/2025 at 11:55 AM    An electronic signature was used to authenticate this note.       This note was generated completely or in part utilizing Dragon dictation speech recognition software.  Occasionally, words are mistranscribed and despite editing, the text may contain inaccuracies due to incorrect word recognition.  If further clarification is needed please contact the office at [phone].    Rajesh Marquis MD

## 2025-06-24 NOTE — PATIENT INSTRUCTIONS
-Continue to take adequate calcium 1200 mg daily and vitamin D 1000 units daily as advised    -In general please avoid falls, use night lights at night.     -Follow up as needed or if new questions/symptoms arise

## 2025-09-02 ENCOUNTER — HOSPITAL ENCOUNTER (OUTPATIENT)
Dept: MRI IMAGING | Age: 35
Discharge: HOME | End: 2025-09-02
Payer: COMMERCIAL

## 2025-09-02 DIAGNOSIS — S09.90XA INJURY OF HEAD, INITIAL ENCOUNTER: ICD-10-CM

## 2025-09-02 PROCEDURE — 70551 MRI BRAIN STEM W/O DYE: CPT
